# Patient Record
Sex: FEMALE | Race: WHITE | NOT HISPANIC OR LATINO | Employment: UNEMPLOYED | URBAN - METROPOLITAN AREA
[De-identification: names, ages, dates, MRNs, and addresses within clinical notes are randomized per-mention and may not be internally consistent; named-entity substitution may affect disease eponyms.]

---

## 2019-07-29 ENCOUNTER — HISTORICAL (OUTPATIENT)
Dept: ADMINISTRATIVE | Facility: HOSPITAL | Age: 34
End: 2019-07-29

## 2019-07-30 ENCOUNTER — HISTORICAL (OUTPATIENT)
Dept: RADIOLOGY | Facility: HOSPITAL | Age: 34
End: 2019-07-30

## 2019-09-10 ENCOUNTER — HISTORICAL (OUTPATIENT)
Dept: ADMINISTRATIVE | Facility: HOSPITAL | Age: 34
End: 2019-09-10

## 2019-11-22 LAB — POC BETA-HCG (QUAL): NEGATIVE

## 2019-12-09 ENCOUNTER — HISTORICAL (OUTPATIENT)
Dept: ADMINISTRATIVE | Facility: HOSPITAL | Age: 34
End: 2019-12-09

## 2019-12-09 LAB
ABS NEUT (OLG): 2.56 X10(3)/MCL (ref 2.1–9.2)
BASOPHILS # BLD AUTO: 0 X10(3)/MCL (ref 0–0.2)
BASOPHILS NFR BLD AUTO: 0 %
BUN SERPL-MCNC: 11 MG/DL (ref 7–18)
CALCIUM SERPL-MCNC: 9.4 MG/DL (ref 8.5–10.1)
CHLORIDE SERPL-SCNC: 110 MMOL/L (ref 98–107)
CO2 SERPL-SCNC: 27 MMOL/L (ref 21–32)
CREAT SERPL-MCNC: 1.2 MG/DL (ref 0.6–1.3)
CREAT/UREA NIT SERPL: 9
EOSINOPHIL # BLD AUTO: 0.1 X10(3)/MCL (ref 0–0.9)
EOSINOPHIL NFR BLD AUTO: 3 %
ERYTHROCYTE [DISTWIDTH] IN BLOOD BY AUTOMATED COUNT: 13.6 % (ref 11.5–14.5)
EST. AVERAGE GLUCOSE BLD GHB EST-MCNC: 103 MG/DL
GLUCOSE SERPL-MCNC: 82 MG/DL (ref 74–106)
HBA1C MFR BLD: 5.2 % (ref 4.2–6.3)
HCT VFR BLD AUTO: 40.2 % (ref 35–46)
HGB BLD-MCNC: 12.5 GM/DL (ref 12–16)
IMM GRANULOCYTES # BLD AUTO: 0.01 10*3/UL
IMM GRANULOCYTES NFR BLD AUTO: 0 %
LYMPHOCYTES # BLD AUTO: 1.4 X10(3)/MCL (ref 0.6–4.6)
LYMPHOCYTES NFR BLD AUTO: 31 %
MCH RBC QN AUTO: 29.5 PG (ref 26–34)
MCHC RBC AUTO-ENTMCNC: 31.1 GM/DL (ref 31–37)
MCV RBC AUTO: 94.8 FL (ref 80–100)
MONOCYTES # BLD AUTO: 0.4 X10(3)/MCL (ref 0.1–1.3)
MONOCYTES NFR BLD AUTO: 8 %
NEUTROPHILS # BLD AUTO: 2.56 X10(3)/MCL (ref 2.1–9.2)
NEUTROPHILS NFR BLD AUTO: 57 %
PLATELET # BLD AUTO: 232 X10(3)/MCL (ref 130–400)
PMV BLD AUTO: 10.5 FL (ref 7.4–10.4)
POTASSIUM SERPL-SCNC: 3.9 MMOL/L (ref 3.5–5.1)
RBC # BLD AUTO: 4.24 X10(6)/MCL (ref 4–5.2)
RPR SER QL: NORMAL
SODIUM SERPL-SCNC: 140 MMOL/L (ref 136–145)
T PALLIDUM AB SER QL: REACTIVE
T3FREE SERPL-MCNC: 3.45 PG/ML (ref 2.18–3.98)
T4 FREE SERPL-MCNC: 1.24 NG/DL (ref 0.76–1.46)
TSH SERPL-ACNC: 0.44 MIU/L (ref 0.36–3.74)
WBC # SPEC AUTO: 4.5 X10(3)/MCL (ref 4.5–11)

## 2020-10-02 ENCOUNTER — HISTORICAL (OUTPATIENT)
Dept: RADIOLOGY | Facility: HOSPITAL | Age: 35
End: 2020-10-02

## 2020-10-14 ENCOUNTER — HISTORICAL (OUTPATIENT)
Dept: ADMINISTRATIVE | Facility: HOSPITAL | Age: 35
End: 2020-10-14

## 2020-10-14 LAB
APPEARANCE, UA: CLEAR
BACTERIA #/AREA URNS AUTO: ABNORMAL /HPF
BILIRUB UR QL STRIP: NEGATIVE
CHOLEST SERPL-MCNC: 173 MG/DL
CHOLEST/HDLC SERPL: 2.4 {RATIO} (ref 0–4.4)
COLOR UR: NORMAL
DEPRECATED CALCIDIOL+CALCIFEROL SERPL-MC: 28.6 NG/ML (ref 30–80)
EST. AVERAGE GLUCOSE BLD GHB EST-MCNC: 111 MG/DL
GLUCOSE (UA): NEGATIVE
HBA1C MFR BLD: 5.5 % (ref 4.2–6.3)
HDLC SERPL-MCNC: 71 MG/DL (ref 40–59)
HGB UR QL STRIP: NEGATIVE
HYALINE CASTS #/AREA URNS LPF: ABNORMAL /LPF
KETONES UR QL STRIP: NEGATIVE
LDLC SERPL CALC-MCNC: 83 MG/DL
LEUKOCYTE ESTERASE UR QL STRIP: NEGATIVE
NITRITE UR QL STRIP: NEGATIVE
PH UR STRIP: 6.5 [PH] (ref 4.5–8)
PROT UR QL STRIP: NEGATIVE
RBC #/AREA URNS AUTO: ABNORMAL /HPF
SP GR UR STRIP: 1.02 (ref 1–1.03)
SQUAMOUS #/AREA URNS LPF: ABNORMAL /LPF
T4 FREE SERPL-MCNC: 1.14 NG/DL (ref 0.76–1.46)
TRIGL SERPL-MCNC: 93 MG/DL
TSH SERPL-ACNC: 0.34 MIU/L (ref 0.36–3.74)
UROBILINOGEN UR STRIP-ACNC: NORMAL
VLDLC SERPL CALC-MCNC: 19 MG/DL
WBC #/AREA URNS AUTO: ABNORMAL /HPF

## 2020-10-28 LAB
HUMAN PAPILLOMAVIRUS (HPV): ABNORMAL
HUMAN PAPILLOMAVIRUS (HPV): ABNORMAL
HUMAN PAPILLOMAVIRUS (HPV): POSITIVE
PAP RECOMMENDATION EXT: ABNORMAL
PAP SMEAR: ABNORMAL

## 2020-11-02 ENCOUNTER — HISTORICAL (OUTPATIENT)
Dept: RADIOLOGY | Facility: HOSPITAL | Age: 35
End: 2020-11-02

## 2021-01-22 ENCOUNTER — HISTORICAL (OUTPATIENT)
Dept: ADMINISTRATIVE | Facility: HOSPITAL | Age: 36
End: 2021-01-22

## 2021-01-22 LAB
ABS NEUT (OLG): 3.97 X10(3)/MCL (ref 2.1–9.2)
ALBUMIN SERPL-MCNC: 3.9 GM/DL (ref 3.5–5)
ALBUMIN/GLOB SERPL: 1 RATIO (ref 1.1–2)
ALP SERPL-CCNC: 77 UNIT/L (ref 40–150)
ALT SERPL-CCNC: 9 UNIT/L (ref 0–55)
AST SERPL-CCNC: 12 UNIT/L (ref 5–34)
BASOPHILS # BLD AUTO: 0 X10(3)/MCL (ref 0–0.2)
BASOPHILS NFR BLD AUTO: 0 %
BILIRUB SERPL-MCNC: 0.2 MG/DL
BILIRUBIN DIRECT+TOT PNL SERPL-MCNC: 0.1 MG/DL (ref 0–0.5)
BILIRUBIN DIRECT+TOT PNL SERPL-MCNC: 0.1 MG/DL (ref 0–0.8)
BUN SERPL-MCNC: 13 MG/DL (ref 7–18.7)
CALCIUM SERPL-MCNC: 9.1 MG/DL (ref 8.4–10.2)
CD3+CD4+ CELLS # SPEC: 544 UNIT/L (ref 589–1505)
CD3+CD4+ CELLS NFR BLD: 40.8 % (ref 31–59)
CHLORIDE SERPL-SCNC: 106 MMOL/L (ref 98–107)
CO2 SERPL-SCNC: 27 MMOL/L (ref 22–29)
CREAT SERPL-MCNC: 1.02 MG/DL (ref 0.55–1.02)
EOSINOPHIL # BLD AUTO: 0.2 X10(3)/MCL (ref 0–0.9)
EOSINOPHIL NFR BLD AUTO: 3 %
ERYTHROCYTE [DISTWIDTH] IN BLOOD BY AUTOMATED COUNT: 14.4 % (ref 11.5–14.5)
GLOBULIN SER-MCNC: 3.8 GM/DL (ref 2.4–3.5)
GLUCOSE SERPL-MCNC: 82 MG/DL (ref 74–100)
HCT VFR BLD AUTO: 37.3 % (ref 35–46)
HGB BLD-MCNC: 11.6 GM/DL (ref 12–16)
IMM GRANULOCYTES # BLD AUTO: 0.01 10*3/UL
IMM GRANULOCYTES NFR BLD AUTO: 0 %
IRON SATN MFR SERPL: 9 % (ref 20–50)
IRON SERPL-MCNC: 35 UG/DL (ref 50–170)
LYMPHOCYTES # BLD AUTO: 1.4 X10(3)/MCL (ref 0.6–4.6)
LYMPHOCYTES # BLD AUTO: 1334 UNIT/L (ref 1260–5520)
LYMPHOCYTES NFR BLD AUTO: 23 %
LYMPHOCYTES NFR LN MANUAL: 23 % (ref 28–48)
LYMPHOMA - T-CELL MARKERS SPEC-IMP: ABNORMAL
MCH RBC QN AUTO: 30.1 PG (ref 26–34)
MCHC RBC AUTO-ENTMCNC: 31.1 GM/DL (ref 31–37)
MCV RBC AUTO: 96.9 FL (ref 80–100)
MONOCYTES # BLD AUTO: 0.3 X10(3)/MCL (ref 0.1–1.3)
MONOCYTES NFR BLD AUTO: 6 %
NEUTROPHILS # BLD AUTO: 3.97 X10(3)/MCL (ref 2.1–9.2)
NEUTROPHILS NFR BLD AUTO: 68 %
PLATELET # BLD AUTO: 273 X10(3)/MCL (ref 130–400)
PMV BLD AUTO: 10.5 FL (ref 7.4–10.4)
POTASSIUM SERPL-SCNC: 3.9 MMOL/L (ref 3.5–5.1)
PROT SERPL-MCNC: 7.7 GM/DL (ref 6.4–8.3)
RBC # BLD AUTO: 3.85 X10(6)/MCL (ref 4–5.2)
SODIUM SERPL-SCNC: 140 MMOL/L (ref 136–145)
TIBC SERPL-MCNC: 372 UG/DL (ref 70–310)
TIBC SERPL-MCNC: 407 UG/DL (ref 250–450)
TRANSFERRIN SERPL-MCNC: 388 MG/DL (ref 180–382)
WBC # BLD AUTO: 5800 /MM3 (ref 4500–11500)
WBC # SPEC AUTO: 5.8 X10(3)/MCL (ref 4.5–11)

## 2021-03-04 LAB — SARS-COV-2 RNA RESP QL NAA+PROBE: NEGATIVE

## 2021-03-12 ENCOUNTER — HISTORICAL (OUTPATIENT)
Dept: RADIOLOGY | Facility: HOSPITAL | Age: 36
End: 2021-03-12

## 2021-03-12 LAB — CREAT SERPL-MCNC: 1.12 MG/DL (ref 0.55–1.02)

## 2021-03-30 ENCOUNTER — HISTORICAL (OUTPATIENT)
Dept: RADIOLOGY | Facility: HOSPITAL | Age: 36
End: 2021-03-30

## 2021-04-22 ENCOUNTER — HISTORICAL (OUTPATIENT)
Dept: ADMINISTRATIVE | Facility: HOSPITAL | Age: 36
End: 2021-04-22

## 2021-04-22 LAB
ABS NEUT (OLG): 3.84 X10(3)/MCL (ref 2.1–9.2)
ALBUMIN SERPL-MCNC: 4.1 GM/DL (ref 3.5–5)
ALBUMIN/GLOB SERPL: 1.3 RATIO (ref 1.1–2)
ALP SERPL-CCNC: 71 UNIT/L (ref 40–150)
ALT SERPL-CCNC: 9 UNIT/L (ref 0–55)
AST SERPL-CCNC: 13 UNIT/L (ref 5–34)
BASOPHILS # BLD AUTO: 0 X10(3)/MCL (ref 0–0.2)
BASOPHILS NFR BLD AUTO: 0 %
BILIRUB SERPL-MCNC: 0.3 MG/DL
BILIRUBIN DIRECT+TOT PNL SERPL-MCNC: 0.1 MG/DL (ref 0–0.5)
BILIRUBIN DIRECT+TOT PNL SERPL-MCNC: 0.2 MG/DL (ref 0–0.8)
BUN SERPL-MCNC: 10.7 MG/DL (ref 7–18.7)
CALCIUM SERPL-MCNC: 9.5 MG/DL (ref 8.4–10.2)
CD3+CD4+ CELLS # SPEC: 651 UNIT/L (ref 589–1505)
CD3+CD4+ CELLS NFR BLD: 37.5 % (ref 31–59)
CHLORIDE SERPL-SCNC: 107 MMOL/L (ref 98–107)
CO2 SERPL-SCNC: 27 MMOL/L (ref 22–29)
CREAT SERPL-MCNC: 1.08 MG/DL (ref 0.55–1.02)
EOSINOPHIL # BLD AUTO: 0.2 X10(3)/MCL (ref 0–0.9)
EOSINOPHIL NFR BLD AUTO: 3 %
ERYTHROCYTE [DISTWIDTH] IN BLOOD BY AUTOMATED COUNT: 14.9 % (ref 11.5–14.5)
GLOBULIN SER-MCNC: 3.2 GM/DL (ref 2.4–3.5)
GLUCOSE SERPL-MCNC: 84 MG/DL (ref 74–100)
HCT VFR BLD AUTO: 45.8 % (ref 35–46)
HGB BLD-MCNC: 14.8 GM/DL (ref 12–16)
IMM GRANULOCYTES # BLD AUTO: 0.01 10*3/UL
IMM GRANULOCYTES NFR BLD AUTO: 0 %
LYMPHOCYTES # BLD AUTO: 1.8 X10(3)/MCL (ref 0.6–4.6)
LYMPHOCYTES # BLD AUTO: 1736 UNIT/L (ref 1260–5520)
LYMPHOCYTES NFR BLD AUTO: 28 %
LYMPHOCYTES NFR LN MANUAL: 28 % (ref 28–48)
LYMPHOMA - T-CELL MARKERS SPEC-IMP: NORMAL
MCH RBC QN AUTO: 33.1 PG (ref 26–34)
MCHC RBC AUTO-ENTMCNC: 32.3 GM/DL (ref 31–37)
MCV RBC AUTO: 102.5 FL (ref 80–100)
MONOCYTES # BLD AUTO: 0.4 X10(3)/MCL (ref 0.1–1.3)
MONOCYTES NFR BLD AUTO: 6 %
NEUTROPHILS # BLD AUTO: 3.84 X10(3)/MCL (ref 2.1–9.2)
NEUTROPHILS NFR BLD AUTO: 62 %
PLATELET # BLD AUTO: 213 X10(3)/MCL (ref 130–400)
PMV BLD AUTO: 10.7 FL (ref 7.4–10.4)
POTASSIUM SERPL-SCNC: 4.5 MMOL/L (ref 3.5–5.1)
PROT SERPL-MCNC: 7.3 GM/DL (ref 6.4–8.3)
RBC # BLD AUTO: 4.47 X10(6)/MCL (ref 4–5.2)
SODIUM SERPL-SCNC: 140 MMOL/L (ref 136–145)
TSH SERPL-ACNC: 0.49 UIU/ML (ref 0.35–4.94)
WBC # BLD AUTO: 6200 /MM3 (ref 4500–11500)
WBC # SPEC AUTO: 6.2 X10(3)/MCL (ref 4.5–11)

## 2021-10-11 ENCOUNTER — HISTORICAL (OUTPATIENT)
Dept: ADMINISTRATIVE | Facility: HOSPITAL | Age: 36
End: 2021-10-11

## 2021-10-11 LAB
ABS NEUT (OLG): 3.87 X10(3)/MCL (ref 2.1–9.2)
ALBUMIN SERPL-MCNC: 4.1 GM/DL (ref 3.5–5)
ALBUMIN/GLOB SERPL: 1.2 RATIO (ref 1.1–2)
ALP SERPL-CCNC: 60 UNIT/L (ref 40–150)
ALT SERPL-CCNC: 10 UNIT/L (ref 0–55)
APPEARANCE, UA: CLEAR
AST SERPL-CCNC: 12 UNIT/L (ref 5–34)
BACTERIA #/AREA URNS AUTO: ABNORMAL /HPF
BASOPHILS # BLD AUTO: 0 X10(3)/MCL (ref 0–0.2)
BASOPHILS NFR BLD AUTO: 0 %
BILIRUB SERPL-MCNC: 0.3 MG/DL
BILIRUB UR QL STRIP: NEGATIVE
BILIRUBIN DIRECT+TOT PNL SERPL-MCNC: 0.1 MG/DL (ref 0–0.5)
BILIRUBIN DIRECT+TOT PNL SERPL-MCNC: 0.2 MG/DL (ref 0–0.8)
BUN SERPL-MCNC: 13.2 MG/DL (ref 7–18.7)
CALCIUM SERPL-MCNC: 9.7 MG/DL (ref 8.4–10.2)
CD3+CD4+ CELLS # SPEC: 537 UNIT/L (ref 589–1505)
CD3+CD4+ CELLS NFR BLD: 41 % (ref 31–59)
CHLORIDE SERPL-SCNC: 109 MMOL/L (ref 98–107)
CHOLEST SERPL-MCNC: 171 MG/DL
CHOLEST/HDLC SERPL: 3 {RATIO} (ref 0–5)
CO2 SERPL-SCNC: 28 MMOL/L (ref 22–29)
COLOR UR: YELLOW
CREAT SERPL-MCNC: 1.16 MG/DL (ref 0.55–1.02)
DEPRECATED CALCIDIOL+CALCIFEROL SERPL-MC: 28 NG/ML (ref 30–80)
EOSINOPHIL # BLD AUTO: 0.2 X10(3)/MCL (ref 0–0.9)
EOSINOPHIL NFR BLD AUTO: 3 %
ERYTHROCYTE [DISTWIDTH] IN BLOOD BY AUTOMATED COUNT: 12.4 % (ref 11.5–14.5)
EST CREAT CLEARANCE SER (OHS): 53.58 ML/MIN
EST. AVERAGE GLUCOSE BLD GHB EST-MCNC: 96.8 MG/DL
GLOBULIN SER-MCNC: 3.3 GM/DL (ref 2.4–3.5)
GLUCOSE (UA): NEGATIVE
GLUCOSE SERPL-MCNC: 58 MG/DL (ref 74–100)
HBA1C MFR BLD: 5 %
HCT VFR BLD AUTO: 44.1 % (ref 35–46)
HCV AB SERPL QL IA: NONREACTIVE
HDLC SERPL-MCNC: 49 MG/DL (ref 35–60)
HGB BLD-MCNC: 14.7 GM/DL (ref 12–16)
HGB UR QL STRIP: NEGATIVE
HYALINE CASTS #/AREA URNS LPF: ABNORMAL /LPF
IMM GRANULOCYTES # BLD AUTO: 0.01 10*3/UL
IMM GRANULOCYTES NFR BLD AUTO: 0 %
KETONES UR QL STRIP: ABNORMAL
LDLC SERPL CALC-MCNC: 106 MG/DL (ref 50–140)
LEUKOCYTE ESTERASE UR QL STRIP: NEGATIVE
LYMPHOCYTES # BLD AUTO: 1.3 X10(3)/MCL (ref 0.6–4.6)
LYMPHOCYTES # BLD AUTO: NORMAL UNIT/L (ref 1260–5520)
LYMPHOCYTES NFR BLD AUTO: 22 %
LYMPHOCYTES NFR LN MANUAL: NORMAL % (ref 28–48)
MCH RBC QN AUTO: 35.2 PG (ref 26–34)
MCHC RBC AUTO-ENTMCNC: 33.3 GM/DL (ref 31–37)
MCV RBC AUTO: 105.5 FL (ref 80–100)
MONOCYTES # BLD AUTO: 0.4 X10(3)/MCL (ref 0.1–1.3)
MONOCYTES NFR BLD AUTO: 7 %
NEG CONT SPOT COUNT: NORMAL
NEUTROPHILS # BLD AUTO: 3.87 X10(3)/MCL (ref 2.1–9.2)
NEUTROPHILS NFR BLD AUTO: 67 %
NITRITE UR QL STRIP: NEGATIVE
NRBC BLD AUTO-RTO: 0 % (ref 0–0.2)
PANEL A SPOT COUNT: 0
PANEL B SPOT COUNT: 0
PH UR STRIP: 6.5 [PH] (ref 4.5–8)
PLATELET # BLD AUTO: 199 X10(3)/MCL (ref 130–400)
PMV BLD AUTO: 10.4 FL (ref 7.4–10.4)
POS CONT SPOT COUNT: NORMAL
POTASSIUM SERPL-SCNC: 4 MMOL/L (ref 3.5–5.1)
PROT SERPL-MCNC: 7.4 GM/DL (ref 6.4–8.3)
PROT UR QL STRIP: 20 MG/DL
RBC # BLD AUTO: 4.18 X10(6)/MCL (ref 4–5.2)
RBC #/AREA URNS AUTO: ABNORMAL /HPF
RPR SER QL: NORMAL
SODIUM SERPL-SCNC: 142 MMOL/L (ref 136–145)
SP GR UR STRIP: 1.03 (ref 1–1.03)
SQUAMOUS #/AREA URNS LPF: ABNORMAL /LPF
T PALLIDUM AB SER QL: REACTIVE
T-SPOT.TB: NORMAL
TRIGL SERPL-MCNC: 78 MG/DL (ref 37–140)
TSH SERPL-ACNC: 0.29 UIU/ML (ref 0.35–4.94)
UROBILINOGEN UR STRIP-ACNC: 2 MG/DL
VLDLC SERPL CALC-MCNC: 16 MG/DL
WBC # BLD AUTO: NORMAL /MM3 (ref 4500–11500)
WBC # SPEC AUTO: 5.8 X10(3)/MCL (ref 4.5–11)
WBC #/AREA URNS AUTO: ABNORMAL /HPF

## 2022-04-09 ENCOUNTER — HISTORICAL (OUTPATIENT)
Dept: ADMINISTRATIVE | Facility: HOSPITAL | Age: 37
End: 2022-04-09
Payer: MEDICAID

## 2022-04-25 VITALS
WEIGHT: 112 LBS | BODY MASS INDEX: 20.61 KG/M2 | HEIGHT: 62 IN | DIASTOLIC BLOOD PRESSURE: 75 MMHG | OXYGEN SATURATION: 100 % | SYSTOLIC BLOOD PRESSURE: 108 MMHG

## 2022-04-27 DIAGNOSIS — B20 HIV DISEASE: Primary | ICD-10-CM

## 2022-04-27 DIAGNOSIS — E05.90 SUBCLINICAL HYPERTHYROIDISM: ICD-10-CM

## 2022-05-02 NOTE — HISTORICAL [facility] CERNER
This is a historical note converted from [employer]. Formatting and pictures may have been removed.  Please reference [employer]ana for original formatting and attached multimedia. Chief Complaint  B20 follow up  History of Present Illness  Gayatri is a 34 yo WF presenting today for HIV f/u visit.? She reports 100% adherent to Triumeq, tolerates well with viral suppression.? She tells me that she has been bleeding vaginally for 6 weeks now, passing large amounts of blood & clots.? She is scheduled to see GYN in 3 weeks.? Pelvic u/s results reviewed from 7/2020, will order additional imaging to be completed prior to scheduled GYN appt.? She remains in care with PCP VIKASH Curtis NP.? She is scheduled for Ophth appt 3/2020 for fundus photo.? Due for Menveo #2 today, amenable to same.? Smokes about 5 cigarettes per day, cutting back.  ?  10/14/20  Gayatri is a 34 yo WF presenting today for HIV f/u visit.? She reports 100% adherent to Triumeq, tolerates well with viral suppression.? She has attended visit with PCP MIRYAM Curtis NP.? Will collect labs today for IM & ID clinics.? She is smoking 3-4 cig/day, weaning off. Last seen by GYN in [place] 11/2019 for cervical dysplasia with colposcopy.? She has relocated to [facility] & wants to receive all care at our facility.? She is sexually active, always with a condom.? Has engaged in oral & anal intercourse as well.? Will perform full screening exam today.? She desires BTL, not a candidate for hormonal contraception due to blood clots & coumadin therapy.? Will refer to GYN for BTL as requested & f/u for dysplasia.? It has been several years since last eye exam, will refer to ophth for same.? She is due for Flu & Gardasil vaccinations today, amenable to same.  ?   7/21/20  Gayatri is a 33 yo HIV + WF evaluated by audio-only (pt logged into ila, but was unable to connect) telemedicine due to COVID-19 pandemic.? She is located at home, and I, the provider, am located at an approved  non-[facility] location.? We are both located in [place], the [facility] in which I am licensed to practice.? The patient consents to telemedicine visit and is the only individual online.??The patient (or patients representative) stated that they understood and accepted the privacy and security risks to their information at their location.? She reports 100% adherent to Triumeq, tolerates well.? Last labs HIV VL 12/19 80, CD4 10/19 433.? She agrees [place] [facility] [place].? She was seen in ED 7/17/20 for pelvic/abdominal pain, she tells me is much better now. She is scheduled for f/u with GYN on 7/24/20 with plans to attend on telemedicine.? She is followed by VIKASH Curtis NP in IM Clinic & HU Cummings RN in coumadin clinic.? She is in need of refills on coumadin & has been having trouble getting in touch with Ms. Hudson.? Will assist.? Otherwise, no concerns voiced today.  ?  Review of Systems  ?  ?  Constitutional: negative except as stated in HPI  Eye: negative except as stated in HPI  ENMT: negative except as stated in HPI  Respiratory: negative except as stated in HPI  Cardiovascular: negative except as stated in HPI  Gastrointestinal: negative except as stated in HPI  Genitourinary: negative except as stated in HPI  Hema/Lymph: negative except as stated in HPI  Endocrine: negative except as stated in HPI  Immunologic: negative except as stated in HPI  Musculoskeletal: negative except as stated in HPI  Integumentary: negative except as stated in HPI  Neurologic: negative except as stated in HPI  ?   All Other ROS_ ?negative except as stated in HPI  Physical Exam  Vitals & Measurements  T:?36.7? ?C (Oral)? HR:?99(Peripheral)? RR:?18? BP:?105/73?  HT:?157.00?cm? WT:?53.800?kg? BMI:?21.83? LMP:?01/22/2021 00:00 CST?  ?  ?  General: AAO X 4, afebrile  Eye: no icterus  HENT: oropharynx clear  Neck: supple  Respiratory: BBS CTA, non-labored, symmetrical  Cardiovascular: S1S2, RRR  Gastrointestinal BS + 4 quadrants,  NTND, soft, no organomegaly  Genitourinary: non-tender  Lymphatics: no lymphadenopathy  Musculoskeletal: MAEW, steady gait  Integumentary: WDI  Neurologic: CN II-XII intact  Assessment/Plan  1.?HIV disease?B20  adherence/sexual health counseling done  cont Triumeq 1 po daily  labs today  rtc?3 mos w Ashley, face to face?  keep f/u with PCP VIKASH Curtis NP  Ordered:  abacavir/dolutegravir/lamivudine, See Instructions, TAKE 1 TABLET BY MOUTH EVERY DAY, # 30 tab(s), 3 Refill(s), Pharmacy: [id number], 157, cm, Height/Length Dosing, 01/22/21 9:40:00 CST, 53.8, kg, Weight Dosing, 01/22/21 9:40:00 CST  1160F- Medication reconciliation completed during visit, HIV disease  Tobacco user  Need for vaccination  Pelvic pain  Menometrorrhagia  Abnormal ultrasound of pelvis, [facility], 01/22/21 10:22:00 CST  Cd4 Lymphocytes., Routine collect, 01/22/21 10:28:00 CST, Blood, Stop date 01/22/21 10:28:00 CST, Lab Collect, HIV disease, 01/22/21 10:28:00 CST  Clinic Follow up, *Est. 04/22/21 10:00:00 CDT, Order for future visit, HIV disease, [facility]  Office/Outpatient Visit Level 4 Established 58231 PC, HIV disease  Tobacco user  Need for vaccination  Pelvic pain  Menometrorrhagia  Abnormal ultrasound of pelvis, [facility], 01/22/21 10:22:00 CST  RNA, PCR(NonGraph)rfx/GenoPRIme(R)-[facility] [id number], Routine collect, 01/22/21 10:28:00 CST, Blood, Stop date 01/22/21 10:28:00 CST, Lab Collect, HIV disease, 01/22/21 10:28:00 CST  ?  2.?Tobacco user?Z72.0  ?cessation encouraged, cutting back  Ordered:  1160F- Medication reconciliation completed during visit, HIV disease  Tobacco user  Need for vaccination  Pelvic pain  Menometrorrhagia  Abnormal ultrasound of pelvis, [facility], 01/22/21 10:22:00 CST  Office/Outpatient Visit Level 4 Established 89632 PC, HIV disease  Tobacco user  Need for vaccination  Pelvic pain  Menometrorrhagia  Abnormal ultrasound of pelvis, [facility], 01/22/21 10:22:00  CST  ?  3.?Need for vaccination?Z23  ?Menveo #2 today  Ordered:  1160F- Medication reconciliation completed during visit, HIV disease  Tobacco user  Need for vaccination  Pelvic pain  Menometrorrhagia  Abnormal ultrasound of pelvis, [facility], 01/22/21 10:22:00 CST  Office/Outpatient Visit Level 4 Established 63742 PC, HIV disease  Tobacco user  Need for vaccination  Pelvic pain  Menometrorrhagia  Abnormal ultrasound of pelvis, [facility], 01/22/21 10:22:00 CST  ?  4.?Pelvic pain?R10.2  ?with abnormal pelvic u/s  MRI of pelvis with & without contrast within 2 weeks  Ordered:  1160F- Medication reconciliation completed during visit, HIV disease  Tobacco user  Need for vaccination  Pelvic pain  Menometrorrhagia  Abnormal ultrasound of pelvis, [facility], 01/22/21 10:22:00 CST  MRI Pelvis W W/O Contrast, Routine, *Est. 01/22/21 3:00:00 CST, Other (please specify), Vaginal bleeding, US abnormal, follow up, None, Ambulatory, Creatinine if needed per protocol, Rad Type, Order for future visit, Pelvic pain, Schedule this test, [facility]...  Office/Outpatient Visit Level 4 Established 03075 PC, HIV disease  Tobacco user  Need for vaccination  Pelvic pain  Menometrorrhagia  Abnormal ultrasound of pelvis, [facility], 01/22/21 10:22:00 CST  ?  5.?Menometrorrhagia?N92.1  ?CBC & Iron profile today  keep appt with GYN 2/2021 as scheduled  Ordered:  1160F- Medication reconciliation completed during visit, HIV disease  Tobacco user  Need for vaccination  Pelvic pain  Menometrorrhagia  Abnormal ultrasound of pelvis, [facility], 01/22/21 10:22:00 CST  Office/Outpatient Visit Level 4 Established 29476 PC, HIV disease  Tobacco user  Need for vaccination  Pelvic pain  Menometrorrhagia  Abnormal ultrasound of pelvis, [facility], 01/22/21 10:22:00 CST  ?  6.?Abnormal ultrasound of pelvis?R93.89  Ordered:  1160F- Medication reconciliation completed during visit, HIV disease  Tobacco  user  Need for vaccination  Pelvic pain  Menometrorrhagia  Abnormal ultrasound of pelvis, [facility], 01/22/21 10:22:00 CST  Office/Outpatient Visit Level 4 Established 73398 PC, HIV disease  Tobacco user  Need for vaccination  Pelvic pain  Menometrorrhagia  Abnormal ultrasound of pelvis, [facility], 01/22/21 10:22:00 CST  ?  Referrals  Clinic Follow up, *Est. 04/22/21 10:00:00 CDT, Order for future visit, HIV disease, [facility]   Problem List/Past Medical History  Ongoing  Closed nondisplaced fracture of proximal phalanx of right great toe  HIV disease  Subclinical hyperthyroidism  Historical  Kidney stone  None  Pregnant  Pregnant  Pregnant  Pregnant  Procedure/Surgical History  Incision and drainage of abscess (eg, carbuncle, suppurative hidradenitis, cutaneous or subcutaneous abscess, cyst, furuncle, or paronychia); simple or single (01/19/2018)  Right Kidney Removal (05/09/2014)  right nephrectomy (05/09/2014)   Medications  traZODONE 50 mg oral tablet ( Desyrel ), 50 mg= 1 tab(s), Oral, Once a day (at bedtime), 1 refills  Triumeq oral tablet, See Instructions, 3 refills  Allergies  Bactrim?(Hives)  Social History  Abuse/Neglect  No, No, Yes, 01/22/2021  Alcohol  Past, Beer, 12/09/2019  Employment/School  Unemployed, 09/10/2019  Exercise  Exercise frequency: 3-4 times/week. Exercise type: Walking., 09/10/2019  Home/Environment  Lives with Significant other. Living situation: Home/Independent. Mobile home, Risks in environment: Pets/Animal exposure., 11/05/2020  Nutrition/Health  Regular, Good, 09/10/2019  Sexual  Sexually active: No. Gender Identity Identifies as female., 09/10/2019  Spiritual/Cultural  non denomination, 09/10/2019  Substance Use  Current, Marijuana, Daily, 11/05/2020  Tobacco - High Risk, 01/06/2015  4 or less cigarettes(less than 1/4 pack)/day in last 30 days, Cigarettes, No, 4 per day., 01/22/2021  Family History  Alcohol user: Father and Maternal Grandfather.  Arthritis:  Mother.  CAD (coronary artery disease): Grandmother.  CARDIOMYOPATHY: Grandfather.  COPD (chronic obstructive pulmonary disease).: Grandfather.  Depression.: Mother and Maternal Grandmother.  Diabetes mellitus type 2: Grandmother and Maternal Grandmother.  Heart disease: Father.  Hyperlipidemia.: Mother.  Hypertension.: Maternal Grandmother.  Kidney disease: Grandfather.  Migraine: Mother.  Substance user: Father.  Immunizations  Vaccine Date Status Comments   meningococcal conjugate vaccine 01/22/2021 Given    tetanus/diphtheria/pertussis, acel(Tdap) 11/05/2020 Given Tolerated well   human papillomavirus vaccine 10/14/2020 Given    influenza virus vaccine, inactivated 10/14/2020 Given    pneumococcal 23-polyvalent vaccine 12/09/2019 Given    meningococcal conjugate vaccine 12/09/2019 Given    influenza virus vaccine, inactivated 10/09/2019 Given    human papillomavirus vaccine 10/09/2019 Given    human papillomavirus vaccine 08/12/2019 Given    pneumococcal 13-valent conjugate vaccine 08/12/2019 Given    tetanus/diphtheria/pertussis, acel(Tdap) 03/24/2009 Recorded    hepatitis B pediatric vaccine 10/06/1998 Recorded    poliovirus vaccine, live, trivalent 03/23/1998 Recorded    Health Maintenance  Health Maintenance  ???Pending?(in the next year)  ??? ??OverDue  ??? ? ? ?Smoking Cessation due??01/01/21??and every 1??year(s)  ??? ??Due?  ??? ? ? ?Alcohol Misuse Screening due??01/02/21??and every 1??year(s)  ??? ??Due In Future?  ??? ? ? ?Influenza Vaccine not due until??10/01/21??and every 1??day(s)  ??? ? ? ?ADL Screening not due until??10/14/21??and every 1??year(s)  ??? ? ? ?Obesity Screening not due until??01/01/22??and every 1??year(s)  ???Satisfied?(in the past 1 year)  ??? ??Satisfied?  ??? ? ? ?ADL Screening on??10/14/20.??Satisfied by Chris Vargas  ??? ? ? ?Alcohol Misuse Screening on??09/22/20.??Satisfied by Jarred Curtis NP  ??? ? ? ?Blood Pressure Screening on??01/22/21.??Satisfied by Violetta Lind  ???  ? ? ?Body Mass Index Check on??01/22/21.??Satisfied by Violetta Lind  ??? ? ? ?Cervical Cancer Screening on??10/14/20.??Satisfied by Beverly Denny  ??? ? ? ?Depression Screening on??01/22/21.??Satisfied by Violetta Lind  ??? ? ? ?Influenza Vaccine on??10/14/20.??Satisfied by Chris Vargas  ??? ? ? ?Obesity Screening on??01/22/21.??Satisfied by Violetta Lind  ??? ? ? ?Smoking Cessation on??09/22/20.??Satisfied by Jarred Curtis NP  ??? ? ? ?Tetanus Vaccine on??11/05/20.??Satisfied by Helen Brunner LPN  ??? ??Refused?  ??? ? ? ?Tetanus Vaccine on??09/22/20.??Recorded by Helen Brunner LPN??Reason: Patient Refuses  ?  cervical pap 11/19 colpo, 10/14/20 ASCUS HPV +  mammogram N/A  anal pap 10/19 QNS, 10/14/20 neg  anal ct/gc 10/19 neg  oral ct/gc 10/19 neg, 10/20 neg  cervical/vag ct/gc 10/19 neg, 10/20 neg  ophth 3/21  Lab Results  Test Name Test Result Date/Time Comments   Creatinine 1.10 mg/dL 10/14/2020 10:23 CDT    eGFR-SONU 61 mL/min (Low) 10/14/2020 10:23 CDT    AST 13 unit/L (Low) 10/14/2020 10:23 CDT    ALT 19 unit/L 10/14/2020 10:23 CDT    Hgb A1c 5.5 % 10/14/2020 10:23 CDT    Vit D 25 OH 28.6 ng/mL (Low) 10/14/2020 10:23 CDT    Chol 173 mg/dL 10/14/2020 10:23 CDT    HDL 71 mg/dL (High) 10/14/2020 10:23 CDT    Trig 93 mg/dL 10/14/2020 10:23 CDT    LDL 83 mg/dL 10/14/2020 10:23 CDT    Chol/HDL 2.4 10/14/2020 10:23 CDT    T4 Free 1.14 ng/dL 10/14/2020 10:23 CDT    TSH 0.341 mIU/L (Low) 10/14/2020 10:23 CDT    WBC 4.9 x10(3)/mcL 10/14/2020 10:23 CDT    Hgb 12.0 gm/dL 10/14/2020 10:23 CDT    Hct 37.0 % 10/14/2020 10:23 CDT    Platelet 221 x10(3)/mcL 10/14/2020 10:23 CDT    CD4 Pct 32.6 % 10/14/2020 10:23 CDT    CD4 Absolute 447 unit/L (Low) 10/14/2020 10:23 CDT    Chlamydia trach-LC Negative 10/14/2020 11:04 CDT    N gonorrhoeae SONU-LC Negative 10/14/2020 11:04 CDT Performed At:  [facility]  [address], [place] [id number]  El RAHMAN MD Ph:[phone]   Chl Pnnna-Nielii-BH  Negative 10/14/2020 11:04 CDT This test was developed and its performance characteristics  determined by [facility]. It has not been cleared or  approved by the Food and Drug Administration.   N vjkxy-Qdzcyf-DD Negative 10/14/2020 11:04 CDT This test was developed and its performance characteristics  determined by [facility]. It has not been cleared or  approved by the Food and Drug Administration.  Performed At: [address] [id number]  Kerwin Renae MD Ph:[phone]   HIV1 RNA PCR-LC <20 cpy/mL 10/14/2020 10:23 CDT HIV-1 RNA not detected  ?  The reportable range for this assay is 20 to 10,000,000  copies HIV-1 RNA/mL.

## 2022-05-02 NOTE — HISTORICAL [facility] CERNER
This is a historical note converted from Liya. Formatting and pictures may have been removed.  Please reference [employer] for original formatting and attached multimedia. Chief Complaint  b20 f/u  History of Present Illness  Gayatri is a 35 yo WF presenting today for HIV f/u visit.? She reports 100% adherent to Triumeq, tolerates well with viral suppression.? Last labs 4/2021 VL <20, CD4 657.? She has not been able to quit smoking as discussed last visit, now smoking 1/2 ppd.? Cessation encouraged.? She remains in care with PCP Jarred Curtis NP & has an appt in about 3 weeks with her.? Remains in care with cardiology as well. She appreciates flu vax today, remains unvaccinated against COVID-19.? She does have some lower back pain controlled with otc acetaminophen, will discuss with PCP at next visit.? All questions answered, concerns addressed.  ?  4/22/21  Gayatri is a 35 WF presenting today for HIV f/u visit.? She reports 100% adherent to Triumeq, tolerates well with viral suppression.? She remains in care with PCP VIKASH Curtis NP.?? She attended appt with GYN on 4/5/21, Mirena was placed & vaginal bleeding has started to slow.? Fundus photo completed 3/21, NL.? Last labs 1/2021 VL <20, Cd4 544.? She tells me that she had an URI a couple of weeks ago which is now improving but has ongoing right lower chest wall pain.? Appreciates chest XRay for further evaluation.? She smokes 6 cig/day, trying to quit.? She does not want to discuss COVID vaccination at all right now.? Wishes respected.  ?   1/22/21  Gayatri is a 36 yo WF presenting today for HIV f/u visit.? She reports 100% adherent to Triumeq, tolerates well with viral suppression.? She tells me that she has been bleeding vaginally for 6 weeks now, passing large amounts of blood & clots.? She is scheduled to see GYN in 3 weeks.? Pelvic u/s results reviewed from 7/2020, will order additional imaging to be completed prior to scheduled GYN appt.? She remains in care  with PCP VIKASH Curtis NP.? She is scheduled for Ophth appt 3/2020 for fundus photo.? Due for Menveo #2 today, amenable to same.? Smokes about 5 cigarettes per day, cutting back.  Review of Systems  ?  ?  Constitutional: negative except as stated in HPI  Eye: negative except as stated in HPI  ENMT: negative except as stated in HPI  Respiratory: negative except as stated in HPI  Cardiovascular: negative except as stated in HPI  Gastrointestinal: negative except as stated in HPI  Genitourinary: negative except as stated in HPI  Hema/Lymph: negative except as stated in HPI  Endocrine: negative except as stated in HPI  Immunologic: negative except as stated in HPI  Musculoskeletal: negative except as stated in HPI  Integumentary: negative except as stated in HPI  Neurologic: negative except as stated in HPI  ?   All Other ROS_ ?negative except as stated in HPI  Physical Exam  Vitals & Measurements  T:?36.9? ?C (Oral)? HR:?75(Peripheral)? RR:?16? BP:?106/72?  HT:?157.00?cm? WT:?51.000?kg? BMI:?20.69? LMP:?10/01/2021 00:00 CDT?  ?  ?  General: AAO X 4, afebrile  Eye: no icterus  HENT: oropharynx clear  Neck: supple  Respiratory: BBS CTA, non-labored, symmetrical  Cardiovascular: S1S2, RRR  Gastrointestinal BS + 4 quadrants, NTND, soft, no organomegaly  Genitourinary: non-tender  Lymphatics: no lymphadenopathy  Musculoskeletal: MAEW, steady gait  Integumentary: WDI  Neurologic: CN II-XII intact  Assessment/Plan  1.?HIV disease?B20  adherence/sexual health counseling done  cont Triumeq 1 po daily  labs today  rtc?4 mos w Ashley, face to face?  keep f/u with PCP VIKASH Curtis NP  Ordered:  abacavir/dolutegravir/lamivudine, See Instructions, TAKE 1 TABLET BY MOUTH EVERY DAY, # 30 tab(s), 4 Refill(s), Pharmacy: [facility], 157, cm, Height/Length Dosing, 10/11/21 8:54:00 CDT, 51, kg, Weight Dosing, 10/11/21 8:54:00 CDT  1160F- Medication reconciliation completed during visit, HIV disease  Tobacco user  Need for  vaccination, [facility], 10/11/21 9:08:00 CDT  Automated Diff, Routine collect, 10/11/21 9:26:00 CDT, Blood, Collected, Stop date 10/11/21 9:26:00 CDT, Lab Collect, HIV disease, 10/11/21 9:16:00 CDT  CBC w/ Auto Diff, Routine collect, 10/11/21 9:16:00 CDT, Blood, Stop date 10/11/21 9:16:00 CDT, Lab Collect, HIV disease, 10/11/21 9:16:00 CDT  Cd4 Lymphocytes., Routine collect, 10/11/21 9:16:00 CDT, Blood, Stop date 10/11/21 9:16:00 CDT, Lab Collect, HIV disease, 10/11/21 9:16:00 CDT  Chlam trachom & N gonorrhoeae by [facility] [id number], Routine collect, Urine, 10/11/21 9:40:00 CDT, Stop date 10/11/21 9:40:00 CDT, Nurse collect, HIV disease, 10/11/21 9:40:00 CDT  Clinic Follow up, *Est. 02/14/22 8:10:00 CST, Order for future visit, HIV disease, [facility]  Comprehensive Metabolic Panel, Routine collect, 10/11/21 9:16:00 CDT, Blood, Stop date 10/11/21 9:16:00 CDT, Lab Collect, HIV disease, 10/11/21 9:16:00 CDT  Hemoglobin A1C [facility], Routine collect, 10/11/21 9:16:00 CDT, Blood, Stop date 10/11/21 9:16:00 CDT, Lab Collect, HIV disease, 10/11/21 9:16:00 CDT  Hepatitis C Antibody, Routine collect, 10/11/21 9:16:00 CDT, Blood, Stop date 10/11/21 9:16:00 CDT, Lab Collect, HIV disease, 10/11/21 9:16:00 CDT  Lipid Panel, Routine collect, 10/11/21 9:16:00 CDT, Blood, Stop date 10/11/21 9:16:00 CDT, Lab Collect, HIV disease, 10/11/21 9:16:00 CDT  Office/Outpatient Visit Level 4 Established 19575 PC, HIV disease  Tobacco user  Need for vaccination, [facility], 10/11/21 9:08:00 CDT  RNA, PCR(NonGraph)rfx/GenoPRIme(R)-[facility] [id number], Routine collect, 10/11/21 9:16:00 CDT, Blood, Stop date 10/11/21 9:16:00 CDT, Lab Collect, HIV disease, 10/11/21 9:16:00 CDT  Syphilis Antibody (with Reflex RPR), Routine collect, 10/11/21 9:16:00 CDT, Blood, Stop date 10/11/21 9:16:00 CDT, Lab Collect, HIV disease, 10/11/21 9:16:00 CDT  T Spot Test for M. tuberculosis, Routine collect, 10/11/21 9:16:00 CDT, Blood, Stop date 10/11/21 9:16:00  CDT, Lab Collect, HIV disease, 10/11/21 9:16:00 CDT  T Spot Test for M. tuberculosis, Routine collect, 10/11/21 9:16:00 CDT, Blood, Stop date 10/11/21 9:16:00 CDT, Lab Collect, HIV disease, 10/11/21 9:16:00 CDT  Thyroid Stimulating Hormone, Routine collect, 10/11/21 9:16:00 CDT, Blood, Stop date 10/11/21 9:16:00 CDT, Lab Collect, HIV disease, 10/11/21 9:16:00 CDT  Urinalysis Microscopic [facility], Routine collect, Urine, Collected, 10/11/21 9:15:00 CDT, Stop date 10/11/21 9:15:00 CDT, Nurse collect, HIV disease  Urinalysis with Microscopic if Indicated, Routine collect, Urine, 10/11/21 9:40:00 CDT, Stop date 10/11/21 9:40:00 CDT, Nurse collect, HIV disease  Vitamin D, 25-Hydroxy Level, Routine collect, 10/11/21 9:16:00 CDT, Blood, Stop date 10/11/21 9:16:00 CDT, Lab Collect, HIV disease, 10/11/21 9:16:00 CDT  ?  2.?Tobacco user?Z72.0  ?cessation encouraged  Ordered:  1160F- Medication reconciliation completed during visit, HIV disease  Tobacco user  Need for vaccination, [facility], 10/11/21 9:08:00 CDT  Office/Outpatient Visit Level 4 Established 34911 , HIV disease  Tobacco user  Need for vaccination, [facility], 10/11/21 9:08:00 CDT  ?  3.?Need for vaccination?Z23  ?flu vax today  Ordered:  1160F- Medication reconciliation completed during visit, HIV disease  Tobacco user  Need for vaccination, [facility], 10/11/21 9:08:00 CDT  Office/Outpatient Visit Level 4 Established 87991 , HIV disease  Tobacco user  Need for vaccination, [facility], 10/11/21 9:08:00 CDT  ?  Referrals  Clinic Follow up, *Est. 02/14/22 8:10:00 CST, Order for future visit, HIV disease, [facility]   Problem List/Past Medical History  Ongoing  Closed nondisplaced fracture of proximal phalanx of right great toe  HIV disease  Subclinical hyperthyroidism  Historical  Kidney stone  None  Pregnant  Pregnant  Pregnant  Pregnant  Procedure/Surgical History  Incision and drainage of abscess (eg, carbuncle, suppurative hidradenitis,  cutaneous or subcutaneous abscess, cyst, furuncle, or paronychia); simple or single (01/19/2018)  Right Kidney Removal (05/09/2014)  right nephrectomy (05/09/2014)   Medications  acetaminophen-hydrocodone 325 mg-5 mg oral tablet, 1 tab(s), Oral, q6hr  ferrous sulfate 325 mg oral tablet, 325 mg= 1 tab(s), Oral, Daily  levonorgestrel 52 mg intrauteral device, 52 mg= 1 EA, Intrauteral, Once  traZODONE 50 mg oral tablet ( Desyrel ), 50 mg= 1 tab(s), Oral, Once a day (at bedtime), 1 refills  Triumeq oral tablet, See Instructions, 4 refills  Allergies  Bactrim?(Hives)  Social History  Abuse/Neglect  No, 10/11/2021  Alcohol  Past, Beer, 04/22/2021  Employment/School  Unemployed, 02/12/2021  Exercise  Exercise type: Walking., 02/12/2021  Financial/Legal Situation  None, 05/05/2021  Home/Environment  Lives with Significant other. Living situation: Home/Independent. Mobile home, Risks in environment: Pets/Animal exposure., 02/12/2021    Never in , 03/16/2021  Nutrition/Health  Regular, Good, 02/12/2021  Sexual  Sexually active: Yes. Number of current partners 1. Sexual orientation: Straight or heterosexual. Gender Identity Identifies as female. Yes, 02/12/2021  Spiritual/Cultural  non denomination, 09/10/2019  Substance Use  Current, Marijuana, Daily, 04/22/2021  Tobacco - High Risk, 01/06/2015  5-9 cigarettes (between 1/4 to 1/2 pack)/day in last 30 days, No, 10/11/2021  Family History  Alcohol user: Father and Maternal Grandfather.  Arthritis: Mother.  CAD (coronary artery disease): Grandmother.  CARDIOMYOPATHY: Grandfather.  COPD (chronic obstructive pulmonary disease).: Grandfather.  Depression.: Mother and Maternal Grandmother.  Diabetes mellitus type 2: Grandmother and Maternal Grandmother.  Heart disease: Father.  Hyperlipidemia.: Mother.  Hypertension.: Maternal Grandmother.  Kidney disease: Grandfather.  Migraine: Mother.  Substance user: Father.  Immunizations  Vaccine Date Status Comments    influenza virus vaccine, inactivated 10/11/2021 Given    meningococcal conjugate vaccine 01/22/2021 Given    tetanus/diphtheria/pertussis, acel(Tdap) 11/05/2020 Given Tolerated well   human papillomavirus vaccine 10/14/2020 Given    influenza virus vaccine, inactivated 10/14/2020 Given    pneumococcal 23-polyvalent vaccine 12/09/2019 Given    meningococcal conjugate vaccine 12/09/2019 Given    influenza virus vaccine, inactivated 10/09/2019 Given    human papillomavirus vaccine 10/09/2019 Given    human papillomavirus vaccine 08/12/2019 Given    pneumococcal 13-valent conjugate vaccine 08/12/2019 Given    tetanus/diphtheria/pertussis, acel(Tdap) 03/24/2009 Recorded    hepatitis B pediatric vaccine 10/06/1998 Recorded    poliovirus vaccine, live, trivalent 03/23/1998 Recorded    Health Maintenance  Health Maintenance  ???Pending?(in the next year)  ??? ??Refused?  ??? ? ? ?Alcohol Misuse Screening due??01/02/21??and every 1??year(s)  ??? ??Due In Future?  ??? ? ? ?Obesity Screening not due until??01/01/22??and every 1??year(s)  ??? ? ? ?Smoking Cessation not due until??01/01/22??and every 1??year(s)  ???Satisfied?(in the past 1 year)  ??? ??Satisfied?  ??? ? ? ?ADL Screening on??10/11/21.??Satisfied by Chris Vargas  ??? ? ? ?Blood Pressure Screening on??10/11/21.??Satisfied by Chris Vargas  ??? ? ? ?Body Mass Index Check on??10/11/21.??Satisfied by Chris Vargas  ??? ? ? ?Cervical Cancer Screening on??10/14/20.??Satisfied by Beverly Denny  ??? ? ? ?Depression Screening on??10/11/21.??Satisfied by Chris Vargas  ??? ? ? ?Influenza Vaccine on??10/11/21.??Satisfied by Chris Vargas  ??? ? ? ?Obesity Screening on??10/11/21.??Satisfied by Chris Vargas  ??? ? ? ?Smoking Cessation on??10/11/21.??Satisfied by Chris Vargas  ??? ? ? ?Tetanus Vaccine on??11/05/20.??Satisfied by Helen Brunner LPN.  ??? ??Refused?  ??? ? ? ?Alcohol Misuse Screening on??03/04/21.??Recorded by Wesley Sheridan??Reason: Patient Refuses  ??? ? ?  ?Smoking Cessation on??03/04/21.??Recorded by Wesley Sheridan??Reason: Patient Refuses  ?  cervical pap 11/19 colpo, 10/14/20 ASCUS HPV +, 2/21 colpo  mammogram N/A  anal pap 10/19 QNS, 10/14/20 neg  anal ct/gc 10/19 neg  oral ct/gc 10/19 neg, 10/20 neg  cervical/vag ct/gc 10/19 neg, 10/20 neg  ophth 3/21  Lab Results  Test Name Test Result Date/Time Comments   Creatinine 1.08 mg/dL (High) 04/22/2021 11:07 CDT    eGFR-SONU 62 mL/min/1.73 m2 (Low) 04/22/2021 11:07 CDT    AST 13 unit/L 04/22/2021 11:07 CDT    ALT 9 unit/L 04/22/2021 11:07 CDT    TSH 0.4889 uIU/mL 04/22/2021 11:07 CDT    WBC 6.2 x10(3)/mcL 04/22/2021 11:07 CDT    Hgb 14.8 gm/dL 04/22/2021 11:07 CDT    Hct 45.8 % 04/22/2021 11:07 CDT    Platelet 213 x10(3)/mcL 04/22/2021 11:07 CDT    CD4 Pct 37.5 % 04/22/2021 11:07 CDT    CD4 Absolute 651 unit/L 04/22/2021 11:07 CDT    HIV1 RNA PCR-LC <20 cpy/mL 04/22/2021 11:07 CDT HIV-1 RNA not detected  ?  The reportable range for this assay is 20 to 10,000,000  copies HIV-1 RNA/mL.      [1] Office Visit Note; Ashley Quiroz 04/22/2021 13:50 CDT

## 2022-05-02 NOTE — HISTORICAL [facility] CERNER
This is a historical note converted from Liya. Formatting and pictures may have been removed.  Please reference [employer] for original formatting and attached multimedia. Chief Complaint  B20 f/u  History of Present Illness  Gayatri is a 35 WF presenting today for HIV f/u visit.? She reports 100% adherent to Triumeq, tolerates well with viral suppression.? She remains in care with PCP VIKASH Curtis NP.?? She attended appt with GYN on 4/5/21, Mirena was placed & vaginal bleeding has started to slow.? Fundus photo completed 3/21, NL.? Last labs 1/2021 VL <20, Cd4 544.? She tells me that she had an URI a couple of weeks ago which is now improving but has ongoing right lower chest wall pain.? Appreciates chest XRay for further evaluation.? She smokes 6 cig/day, trying to quit.? She does not want to discuss COVID vaccination at all right now.? Wishes respected.  ?  1/22/21  Gayatri is a 36 yo WF presenting today for HIV f/u visit.? She reports 100% adherent to Triumeq, tolerates well with viral suppression.? She tells me that she has been bleeding vaginally for 6 weeks now, passing large amounts of blood & clots.? She is scheduled to see GYN in 3 weeks.? Pelvic u/s results reviewed from 7/2020, will order additional imaging to be completed prior to scheduled GYN appt.? She remains in care with PCP VIKASH Curtis NP.? She is scheduled for Ophth appt 3/2020 for fundus photo.? Due for Menveo #2 today, amenable to same.? Smokes about 5 cigarettes per day, cutting back.  ?   10/14/20  Gayatri is a 36 yo WF presenting today for HIV f/u visit.? She reports 100% adherent to Triumeq, tolerates well with viral suppression.? She has attended visit with PCP MIRYAM Curtis NP.? Will collect labs today for IM & ID clinics.? She is smoking 3-4 cig/day, weaning off. Last seen by GYN in [place] 11/2019 for cervical dysplasia with colposcopy.? She has relocated to [facility] & wants to receive all care at our facility.? She is sexually  active, always with a condom.? Has engaged in oral & anal intercourse as well.? Will perform full screening exam today.? She desires BTL, not a candidate for hormonal contraception due to blood clots & coumadin therapy.? Will refer to GYN for BTL as requested & f/u for dysplasia.? It has been several years since last eye exam, will refer to ophth for same.? She is due for Flu & Gardasil vaccinations today, amenable to same.  Review of Systems  ?  ?  Constitutional: negative except as stated in HPI  Eye: negative except as stated in HPI  ENMT: negative except as stated in HPI  Respiratory: negative except as stated in HPI  Cardiovascular: negative except as stated in HPI  Gastrointestinal: negative except as stated in HPI  Genitourinary: negative except as stated in HPI  Hema/Lymph: negative except as stated in HPI  Endocrine: negative except as stated in HPI  Immunologic: negative except as stated in HPI  Musculoskeletal: negative except as stated in HPI  Integumentary: negative except as stated in HPI  Neurologic: negative except as stated in HPI  ?   All Other ROS_ ?negative except as stated in HPI  Physical Exam  Vitals & Measurements  T:?36.7? ?C (Oral)? HR:?65(Peripheral)? RR:?18? BP:?103/74?  HT:?162.00?cm? WT:?52.850?kg? BMI:?20.14? LMP:?04/08/2021 00:00 CDT?  ?  ?  General: AAO X 4, afebrile  Eye: no icterus  HENT: oropharynx clear  Neck: supple  Respiratory: BBS CTA, non-labored, symmetrical  Cardiovascular: S1S2, RRR  Gastrointestinal BS + 4 quadrants, NTND, soft, no organomegaly  Genitourinary: non-tender  Lymphatics: no lymphadenopathy  Musculoskeletal: MAEW, steady gait  Integumentary: WDI  Neurologic: CN II-XII intact  Assessment/Plan  1.?HIV disease?B20  adherence/sexual health counseling done  cont Triumeq 1 po daily  labs today  rtc?4 mos w Ashley, face to face?  keep f/u with PCP VIKASH Curtis NP  Ordered:  abacavir/dolutegravir/lamivudine, See Instructions, TAKE 1 TABLET BY MOUTH EVERY DAY, # 30  tab(s), 4 Refill(s), Pharmacy: [facility], 162, cm, Height/Length Dosing, 04/22/21 10:18:00 CDT, 52.85, kg, Weight Dosing, 04/22/21 10:18:00 CDT  1160F- Medication reconciliation completed during visit, HIV disease  Tobacco user  URI (upper respiratory infection), [facility], 04/22/21 10:39:00 CDT  Cd4 Lymphocytes., Routine collect, 04/22/21 11:00:00 CDT, Blood, Stop date 04/22/21 11:00:00 CDT, Lab Collect, HIV disease, 04/22/21 11:00:00 CDT  Clinic Follow up, *Est. 08/26/21 10:00:00 CDT, Order for future visit, HIV disease, [facility]  Office/Outpatient Visit Level 4 Established 15252 PC, HIV disease  Tobacco user  URI (upper respiratory infection), [facility], 04/22/21 10:39:00 CDT  RNA, PCR(NonGraph)rfx/GenoPRIme(R)-[facility] [id number], Routine collect, 04/22/21 11:00:00 CDT, Blood, Stop date 04/22/21 11:00:00 CDT, Lab Collect, HIV disease, 04/22/21 11:00:00 CDT  ?  2.?Tobacco user?Z72.0  ?cessation encouraged, cutting back  Ordered:  1160F- Medication reconciliation completed during visit, HIV disease  Tobacco user  URI (upper respiratory infection), [facility], 04/22/21 10:39:00 CDT  Office/Outpatient Visit Level 4 Established 19552 PC, HIV disease  Tobacco user  URI (upper respiratory infection), [facility], 04/22/21 10:39:00 CDT  ?  3.?URI (upper respiratory infection)?J06.9  ?Chest xray today  Ordered:  1160F- Medication reconciliation completed during visit, HIV disease  Tobacco user  URI (upper respiratory infection), [facility], 04/22/21 10:39:00 CDT  Office/Outpatient Visit Level 4 Established 47678 PC, HIV disease  Tobacco user  URI (upper respiratory infection), [facility], 04/22/21 10:39:00 CDT  ?  Referrals  Clinic Follow up, *Est. 08/26/21 10:00:00 CDT, Order for future visit, HIV disease, [facility]   Problem List/Past Medical History  Ongoing  Closed nondisplaced fracture of proximal phalanx of right great toe  HIV disease  Subclinical  hyperthyroidism  Historical  Kidney stone  None  Pregnant  Pregnant  Pregnant  Pregnant  Procedure/Surgical History  Incision and drainage of abscess (eg, carbuncle, suppurative hidradenitis, cutaneous or subcutaneous abscess, cyst, furuncle, or paronychia); simple or single (01/19/2018)  Right Kidney Removal (05/09/2014)  right nephrectomy (05/09/2014)   Medications  ferrous sulfate 325 mg oral tablet, 325 mg= 1 tab(s), Oral, Daily, 3 refills  levonorgestrel 52 mg intrauteral device, 52 mg= 1 EA, Intrauteral, Once  Promethazine DM 6.25 mg-15 mg/5 mL oral syrup, 5 mL, Oral, q6hr, PRN  traZODONE 50 mg oral tablet ( Desyrel ), 50 mg= 1 tab(s), Oral, Once a day (at bedtime), 1 refills  Triumeq oral tablet, See Instructions, 4 refills  Allergies  Bactrim?(Hives)  Social History  Abuse/Neglect  No, No, Yes, 04/22/2021  Alcohol  Past, Beer, 04/22/2021  Employment/School  Unemployed, 02/12/2021  Exercise  Exercise type: Walking., 02/12/2021  Home/Environment  Lives with Significant other. Living situation: Home/Independent. Mobile home, Risks in environment: Pets/Animal exposure., 02/12/2021    Never in , 03/16/2021  Nutrition/Health  Regular, Good, 02/12/2021  Sexual  Sexually active: Yes. Number of current partners 1. Sexual orientation: Straight or heterosexual. Gender Identity Identifies as female. Yes, 02/12/2021  Spiritual/Cultural  non denomination, 09/10/2019  Substance Use  Current, Marijuana, Daily, 04/22/2021  Tobacco - High Risk, 01/06/2015  5-9 cigarettes (between 1/4 to 1/2 pack)/day in last 30 days, Cigarettes, No, 8 per day., 04/22/2021  Family History  Alcohol user: Father and Maternal Grandfather.  Arthritis: Mother.  CAD (coronary artery disease): Grandmother.  CARDIOMYOPATHY: Grandfather.  COPD (chronic obstructive pulmonary disease).: Grandfather.  Depression.: Mother and Maternal Grandmother.  Diabetes mellitus type 2: Grandmother and Maternal Grandmother.  Heart disease:  Father.  Hyperlipidemia.: Mother.  Hypertension.: Maternal Grandmother.  Kidney disease: Grandfather.  Migraine: Mother.  Substance user: Father.  Immunizations  Vaccine Date Status Comments   meningococcal conjugate vaccine 01/22/2021 Given    tetanus/diphtheria/pertussis, acel(Tdap) 11/05/2020 Given Tolerated well   human papillomavirus vaccine 10/14/2020 Given    influenza virus vaccine, inactivated 10/14/2020 Given    pneumococcal 23-polyvalent vaccine 12/09/2019 Given    meningococcal conjugate vaccine 12/09/2019 Given    influenza virus vaccine, inactivated 10/09/2019 Given    human papillomavirus vaccine 10/09/2019 Given    human papillomavirus vaccine 08/12/2019 Given    pneumococcal 13-valent conjugate vaccine 08/12/2019 Given    tetanus/diphtheria/pertussis, acel(Tdap) 03/24/2009 Recorded    hepatitis B pediatric vaccine 10/06/1998 Recorded    poliovirus vaccine, live, trivalent 03/23/1998 Recorded    Health Maintenance  Health Maintenance  ???Pending?(in the next year)  ??? ??Refused?  ??? ? ? ?Smoking Cessation due??01/01/21??and every 1??year(s)  ??? ? ? ?Alcohol Misuse Screening due??01/02/21??and every 1??year(s)  ??? ??Due In Future?  ??? ? ? ?Influenza Vaccine not due until??10/01/21??and every 1??day(s)  ??? ? ? ?Obesity Screening not due until??01/01/22??and every 1??year(s)  ???Satisfied?(in the past 1 year)  ??? ??Satisfied?  ??? ? ? ?ADL Screening on??04/22/21.??Satisfied by Elizabeth Pantoja LPN  ??? ? ? ?Alcohol Misuse Screening on??09/22/20.??Satisfied by Jarred Curtis NP  ??? ? ? ?Blood Pressure Screening on??04/22/21.??Satisfied by Elizabeth Pantoja LPN  ??? ? ? ?Body Mass Index Check on??04/22/21.??Satisfied by Elizabeth Pantoja LPN  ??? ? ? ?Cervical Cancer Screening on??10/14/20.??Satisfied by Beverly Denny  ??? ? ? ?Depression Screening on??04/22/21.??Satisfied by Elizabeth Pantoja LPN  ??? ? ? ?Influenza Vaccine on??10/14/20.??Satisfied by Chris Vargas  ??? ? ? ?Obesity  Screening on??04/22/21.??Satisfied by Elizabeth Pantoja LPN  ??? ? ? ?Smoking Cessation on??09/22/20.??Satisfied by Jarred Curtis NP  ??? ? ? ?Tetanus Vaccine on??11/05/20.??Satisfied by Helen Brunner LPN  ??? ??Refused?  ??? ? ? ?Alcohol Misuse Screening on??03/04/21.??Recorded by Wesley Sheridan??Reason: Patient Refuses  ??? ? ? ?Smoking Cessation on??03/04/21.??Recorded by Wesley Sheridan??Reason: Patient Refuses  ??? ? ? ?Tetanus Vaccine on??09/22/20.??Recorded by Helen Brunner LPN??Reason: Patient Refuses  ?  cervical pap 11/19 colpo, 10/14/20 ASCUS HPV +, 2/21 colpo  mammogram N/A  anal pap 10/19 QNS, 10/14/20 neg  anal ct/gc 10/19 neg  oral ct/gc 10/19 neg, 10/20 neg  cervical/vag ct/gc 10/19 neg, 10/20 neg  ophth 3/21  Lab Results  Test Name Test Result Date/Time Comments   Creatinine 1.02 mg/dL 01/22/2021 10:28 CST    eGFR-SONU 66 mL/min/1.73 m2 (Low) 01/22/2021 10:28 CST    AST 12 unit/L 01/22/2021 10:28 CST    ALT 9 unit/L 01/22/2021 10:28 CST    WBC 5.8 x10(3)/mcL 01/22/2021 10:28 CST    Hgb 11.6 gm/dL (Low) 01/22/2021 10:28 CST    Hct 37.3 % 01/22/2021 10:28 CST    Platelet 273 x10(3)/mcL 01/22/2021 10:28 CST    Lymph Percent 23 % (Low) 01/22/2021 10:28 CST    CD4 Pct 40.8 % 01/22/2021 10:28 CST    CD4 Absolute 544 unit/L (Low) 01/22/2021 10:28 CST    HIV1 RNA PCR-LC <20 cpy/mL 01/22/2021 10:28 CST HIV-1 RNA not detected  ?  The reportable range for this assay is 20 to 10,000,000  copies HIV-1 RNA/mL.

## 2022-05-02 NOTE — HISTORICAL [facility] CERNER
This is a historical note converted from Liya. Formatting and pictures may have been removed.  Please reference Cerjuana for original formatting and attached multimedia. Chief Complaint  b20 f/u  History of Present Illness  Gayatri is a 36 yo WF presenting today for HIV f/u visit.? She reports 100% adherent to Triumeq, tolerates well with viral suppression.? She has attended visit with PCP MIRYAM Curtis NP.? Will collect labs today for IM & ID clinics.? She is smoking 3-4 cig/day, weaning off. Last seen by GYN in [place] 11/2019 for cervical dysplasia with colposcopy.? She has relocated to [facility] & wants to receive all care at our facility.? She is sexually active, always with a condom.? Has engaged in oral & anal intercourse as well.? Will perform full screening exam today.? She desires BTL, not a candidate for hormonal contraception due to blood clots & coumadin therapy.? Will refer to GYN for BTL as requested & f/u for dysplasia.? It has been several years since last eye exam, will refer to ophth for same.? She is due for Flu & Gardasil vaccinations today, amenable to same.  ?  7/21/20  Gayatri is a 33 yo HIV + WF evaluated by audio-only (pt logged into ila, but was unable to connect) telemedicine due to COVID-19 pandemic.? She is located at home, and I, the provider, am located at an approved non-[facility] location.? We are both located in [place], the state in which I am licensed to practice.? The patient consents to telemedicine visit and is the only individual online.??The patient (or patients representative) stated that they understood and accepted the privacy and security risks to their information at their location.? She reports 100% adherent to Triumeq, tolerates well.? Last labs HIV VL 12/19 80, CD4 10/19 433.? She agrees to go to [facility] this week for updated labs.? She was seen in ED 7/17/20 for pelvic/abdominal pain, she tells me is much better now. She is scheduled for f/u with GYN on 7/24/20 with  plans to attend on telemedicine.? She is followed by VIKASH Curtis NP in IM Clinic & HU Cummings RN in coumadin clinic.? She is in need of refills on coumadin & has been having trouble getting in touch with Ms. Hudson.? Will assist.? Otherwise, no concerns voiced today.  ?   Telephone?time spent with patient exceeds?15 minutes, over 50% of which was used for education and counseling regarding medical conditions, current medications including risk/benefit and side effects/adverse events, over the counter medications-uses/doses, home self-care?and contact precautions, and red flags and indications for immediate medical attention.??The patient is receptive, expresses understanding and is agreeable to plan. All questions answered.  ?   3/24/20  Gayatri is a 33 yo HIV + female evaluated via telephone visit due to COVID 19 pandemic.? She consents verbally to phone visit and is only person on phone line.? She reports 100% adherent to Triumeq daily with food, last labs 12/2019 VL 80 copies.? Denies fever, chills, body aches.? Endorses nasal drip with cough.? No concerns voiced today.  ?  Review of Systems  ?  ?  Constitutional: negative except as stated in HPI  Eye: negative except as stated in HPI  ENMT: negative except as stated in HPI  Respiratory: negative except as stated in HPI  Cardiovascular: negative except as stated in HPI  Gastrointestinal: negative except as stated in HPI  Genitourinary: negative except as stated in HPI  Hema/Lymph: negative except as stated in HPI  Endocrine: negative except as stated in HPI  Immunologic: negative except as stated in HPI  Musculoskeletal: negative except as stated in HPI  Integumentary: negative except as stated in HPI  Neurologic: negative except as stated in HPI  ?   All Other ROS_ ?negative except as stated in HPI  Physical Exam  Vitals & Measurements  T:?36.7? ?C (Oral)? HR:?67(Peripheral)? RR:?14? BP:?103/71?  HT:?157.00?cm? WT:?57.100?kg? BMI:?23.17? LMP:?10/01/2020 00:00  CDT?  General: AAO X 4, afebrile  Eye: no icterus  HENT: oropharynx clear, oral ct/gc swab done  Neck: supple  Respiratory: BBS CTA, non-labored, symmetrical  Cardiovascular: S1S2, RRR  Breasts: no skin changes, no palp masses, no LAD, no nipple d/c  Gastrointestinal BS + 4 quadrants, NTND, soft, no organomegaly  Genitourinary: non-tender, no ext lesions,?no vag?d/c, cervix pink, no lesions, no palp mass ut/adx  Rectal: no ext lesions, good sphincter tone, no palp mass, no esme blood, swab done for?pap  Lymphatics: no lymphadenopathy  Musculoskeletal: MAEW, steady gait  Integumentary: WDI  Neurologic: CN II-XII intact  Assessment/Plan  1.?HIV disease?B20  ?adherence/sexual health counseling done  cont Triumeq 1 po daily  labs today  rtc?3 mos w Ashley, face to face?  refer to ophth for fundus photo  keep f/u with PCP VIKASH Curtis NP  Ordered:  abacavir/dolutegravir/lamivudine, See Instructions, TAKE 1 TABLET BY MOUTH EVERY DAY, # 30 tab(s), 3 Refill(s), Pharmacy: [facility] #[id number], 157, cm, Height/Length Dosing, 10/14/20 9:36:00 CDT, 57.1, kg, Weight Dosing, 10/14/20 9:36:00 CDT  .Cd4 Lymphocytes, Routine collect, 10/14/20 10:01:00 CDT, Blood, Order for future visit, Stop date 10/14/20 10:01:00 CDT, Lab Collect, HIV disease, 10/14/20 10:01:00 CDT  1160F- Medication reconciliation completed during visit, HIV disease  Need for vaccination  Routine screening for STI (sexually transmitted infection)  Cervical cancer screening  Contraception management  Cancer screening  Tobacco user, [facility], 10/14/20 10:01:00 CDT  CBC w/ Auto Diff, Now collect, 10/14/20 10:01:00 CDT, Blood, Order for future visit, Stop date 10/14/20 10:01:00 CDT, Lab Collect, HIV disease, 10/14/20 10:01:00 CDT  Clinic Follow up, *Est. 01/14/21 3:00:00 CST, Order for future visit, HIV disease, [facility]  Comprehensive Metabolic Panel, Routine collect, 10/14/20 10:01:00 CDT, Blood, Order for future visit, Stop date 10/14/20  10:01:00 CDT, Lab Collect, HIV disease, 10/14/20 10:01:00 CDT  Office/Outpatient Visit Level 4 Established 21880 PC, HIV disease  Need for vaccination  Routine screening for STI (sexually transmitted infection)  Cervical cancer screening  Contraception management  Cancer screening  Tobacco user, [facility] 25, 10/14/20 10:00:00 CDT  RNA, PCR(NonGraph)rfx/GenoPRIme(R)-[facility] [id number], Routine collect, 10/14/20 10:01:00 CDT, Blood, Order for future visit, Stop date 10/14/20 10:01:00 CDT, Lab Collect, HIV disease, 10/14/20 10:01:00 CDT  ?  2.?Need for vaccination?Z23  ?Flu & Gardasil 9 #3 today  Menveo #2 & TDAP next visit  Ordered:  human papillomavirus vaccine, 0.5 mL, form: Susp, IM, Once-Unscheduled, first dose 10/14/20 10:01:00 CDT  Influenza Virus Vaccine, Inactivated, 0.5 mL, form: Susp, IM, Once, first dose 10/14/20 10:01:00 CDT, stop date 10/14/20 10:01:00 CDT  1160F- Medication reconciliation completed during visit, HIV disease  Need for vaccination  Routine screening for STI (sexually transmitted infection)  Cervical cancer screening  Contraception management  Cancer screening  Tobacco user, [facility], 10/14/20 10:01:00 CDT  Office/Outpatient Visit Level 4 Established 58587 PC, HIV disease  Need for vaccination  Routine screening for STI (sexually transmitted infection)  Cervical cancer screening  Contraception management  Cancer screening  Tobacco user, [facility] 25, 10/14/20 10:00:00 CDT  ?  3.?Routine screening for STI (sexually transmitted infection)?Z11.3  ?oral & cervical swabs for ct/gc, wet prep collected  Ordered:  1160F- Medication reconciliation completed during visit, HIV disease  Need for vaccination  Routine screening for STI (sexually transmitted infection)  Cervical cancer screening  Contraception management  Cancer screening  Tobacco user, [facility], 10/14/20 10:01:00 CDT  Chlam trachom & N gonorrhoeae by [facility] [id number], Routine collect, Cervical, Order for  future visit, 10/14/20 10:01:00 CDT, Stop date 10/14/20 10:01:00 CDT, Nurse collect, Routine screening for STI (sexually transmitted infection), 10/14/20 10:01:00 CDT  Chlamydia trach & N.gonorrhoeae Pharyn AMD-[facility], Routine collect, Throat, Order for future visit, 10/14/20 10:01:00 CDT, Stop date 10/14/20 10:01:00 CDT, Nurse collect, Routine screening for STI (sexually transmitted infection), 10/14/20 10:01:00 CDT  Office/Outpatient Visit Level 4 Established 42628 PC, HIV disease  Need for vaccination  Routine screening for STI (sexually transmitted infection)  Cervical cancer screening  Contraception management  Cancer screening  Tobacco user, [facility] 25, 10/14/20 10:00:00 CDT  Wet Prep Smear, Routine collect, Vaginal, Order for future visit, 10/14/20 10:01:00 CDT, Stop date 10/14/20 10:01:00 CDT, Nurse collect, Routine screening for STI (sexually transmitted infection), 10/14/20 10:01:00 CDT  ?  4.?Cervical cancer screening?Z12.4  ?hx of dysplasia with colpo 11/2019  cervical pap collected  refer to GYN for f/u  Ordered:  1160F- Medication reconciliation completed during visit, HIV disease  Need for vaccination  Routine screening for STI (sexually transmitted infection)  Cervical cancer screening  Contraception management  Cancer screening  Tobacco user, [facility], 10/14/20 10:01:00 CDT  Office/Outpatient Visit Level 4 Established 13986 PC, HIV disease  Need for vaccination  Routine screening for STI (sexually transmitted infection)  Cervical cancer screening  Contraception management  Cancer screening  Tobacco user, [facility] 25, 10/14/20 10:00:00 CDT  Pap Collections PC, 10/14/20 10:00:00 CDT, [facility], Routine, 10/14/20 10:00:00 CDT, Cervical cancer screening  Pathology Gyn Request, 10/14/20 10:01:00 CDT, AP Specimen, Thin Prep Pap Cervical-Auto/man screen, Routine GYN/Pap, Cervical, Thin Prep Pap, Normal, 10/01/20, Previous Pap, 11/2019, Abnormal Pap, None, Previous Pregnancy,  Cervix Present, Routine, Collected, RT - Routine, Ce...  ?  5.?Contraception management?Z30.9  ?desires BTL  refer to GYN for evaluation  Ordered:  1160F- Medication reconciliation completed during visit, HIV disease  Need for vaccination  Routine screening for STI (sexually transmitted infection)  Cervical cancer screening  Contraception management  Cancer screening  Tobacco user, [facility], 10/14/20 10:01:00 CDT  Office/Outpatient Visit Level 4 Established 80619 PC, HIV disease  Need for vaccination  Routine screening for STI (sexually transmitted infection)  Cervical cancer screening  Contraception management  Cancer screening  Tobacco user, [facility] 25, 10/14/20 10:00:00 CDT  ?  6.?Cancer screening?Z12.9  ?anal pap collected, hx of cervical dysplasia  Ordered:  1160F- Medication reconciliation completed during visit, HIV disease  Need for vaccination  Routine screening for STI (sexually transmitted infection)  Cervical cancer screening  Contraception management  Cancer screening  Tobacco user, [facility], 10/14/20 10:01:00 CDT  Office/Outpatient Visit Level 4 Established 47031 PC, HIV disease  Need for vaccination  Routine screening for STI (sexually transmitted infection)  Cervical cancer screening  Contraception management  Cancer screening  Tobacco user, [facility] 25, 10/14/20 10:00:00 CDT  Pathology Non-Gyn Request [facility], 10/14/20 10:01:00 CDT, Routine, Order for future visit, AP Specimen, anal swab, anal ca screen/hiv/cervical dysplasia, Collected, Nurse Collect, Print Label, RT - Routine, [email or web address], Hold Until Collected, Cancer screening, 10/14/20 10:01:00 CDT  ?  7.?Tobacco user?Z72.0  ?cessation encouraged  Ordered:  1160F- Medication reconciliation completed during visit, HIV disease  Need for vaccination  Routine screening for STI (sexually transmitted infection)  Cervical cancer screening  Contraception management  Cancer screening  Tobacco user, [facility], 10/14/20  10:01:00 CDT  Office/Outpatient Visit Level 4 Established 88231 PC, HIV disease  Need for vaccination  Routine screening for STI (sexually transmitted infection)  Cervical cancer screening  Contraception management  Cancer screening  Tobacco user, [facility], 10/14/20 10:00:00 CDT  ?  Referrals  [facility] Internal Referral to Gynecology Clinic, Specialty: Gynecology, Reason: Hx of cervical dysplasia with colpo 11/2019, repeated pap 10/14/20 results pending; desires BTL, Start: 10/14/20 10:04:00 CDT  [facility] Internal Referral to Ophthalmology Fundus Clinic, Specialty: Ophthalmology, Reason: fundus photo, Start: 10/14/20 10:11:00 CDT  Clinic Follow up, *Est. 01/14/21 3:00:00 CST, Order for future visit, HIV disease, [facility]   Problem List/Past Medical History  Ongoing  Closed nondisplaced fracture of proximal phalanx of right great toe  HIV disease  Subclinical hyperthyroidism  Historical  Kidney stone  None  Pregnant  Pregnant  Pregnant  Pregnant  Procedure/Surgical History  Incision and drainage of abscess (eg, carbuncle, suppurative hidradenitis, cutaneous or subcutaneous abscess, cyst, furuncle, or paronychia); simple or single (01/19/2018)  Right Kidney Removal (05/09/2014)  right nephrectomy (05/09/2014)   Medications  Gardasil 9 intramuscular suspension, 0.5 mL, IM, Once-Unscheduled  influenza virus vaccine, inactivated quadrivalent intramuscular suspension, 0.5 mL, IM, Once  methocarbamol 750 mg oral tablet, 1500 mg= 2 tab(s), Oral, TID  ondansetron 4 mg oral tablet, disintegrating, 4 mg= 1 tab(s), Oral, q6hr  ondansetron 8 mg oral tablet, disintegrating, 8 mg= 1 tab(s), Oral, q6hr  Toradol 10 mg oral tablet, 10 mg= 1 tab(s), Oral, QID  traMADol 50 mg oral tablet, 50 mg= 1 tab(s), Oral, q6hr  traZODONE 50 mg oral tablet ( Desyrel ), 50 mg= 1 tab(s), Oral, Once a day (at bedtime), 1 refills  Triumeq oral tablet, See Instructions, 3 refills  Allergies  Bactrim  Social History  Abuse/Neglect  No,  09/22/2020  Alcohol  Past, Beer, 12/09/2019  Employment/School  Unemployed, 09/10/2019  Exercise  Exercise frequency: 3-4 times/week. Exercise type: Walking., 09/10/2019  Home/Environment  Lives with Mother, Siblings. Living situation: Home/Independent. Walker/Cane, Wheelchair, Single family house, Risks in environment: Pets/Animal exposure., 09/10/2019  Nutrition/Health  Regular, Good, 09/10/2019  Sexual  Sexually active: No. Gender Identity Identifies as female., 09/10/2019  Spiritual/Cultural  non denomination, 09/10/2019  Substance Use  Past, Cocaine, Marijuana, 09/22/2020  Tobacco - High Risk, 01/06/2015  4 or less cigarettes(less than 1/4 pack)/day in last 30 days, Cigarettes, No, 10/14/2020  Family History  Alcohol user: Father and Maternal Grandfather.  Arthritis: Mother.  CAD (coronary artery disease): Grandmother.  CARDIOMYOPATHY: Grandfather.  COPD (chronic obstructive pulmonary disease).: Grandfather.  Depression.: Mother and Maternal Grandmother.  Diabetes mellitus type 2: Grandmother and Maternal Grandmother.  Heart disease: Father.  Hyperlipidemia.: Mother.  Hypertension.: Maternal Grandmother.  Kidney disease: Grandfather.  Migraine: Mother.  Substance user: Father.  Immunizations  Vaccine Date Status   pneumococcal 23-polyvalent vaccine 12/09/2019 Given   meningococcal conjugate vaccine 12/09/2019 Given   influenza virus vaccine, inactivated 10/09/2019 Given   human papillomavirus vaccine 10/09/2019 Given   human papillomavirus vaccine 08/12/2019 Given   pneumococcal 13-valent conjugate vaccine 08/12/2019 Given   tetanus/diphtheria/pertussis, acel(Tdap) 03/24/2009 Recorded   hepatitis B pediatric vaccine 10/06/1998 Recorded   poliovirus vaccine, live, trivalent 03/23/1998 Recorded   Health Maintenance  Health Maintenance  ???Pending?(in the next year)  ??? ??Due?  ??? ? ? ?Influenza Vaccine due??10/01/20??and every 1??day(s)  ??? ??Refused?  ??? ? ? ?Tetanus Vaccine due??03/24/19??and every  10??year(s)  ??? ??Due In Future?  ??? ? ? ?Obesity Screening not due until??01/01/21??and every 1??year(s)  ??? ? ? ?Smoking Cessation not due until??01/01/21??and every 1??year(s)  ??? ? ? ?Alcohol Misuse Screening not due until??01/02/21??and every 1??year(s)  ???Satisfied?(in the past 1 year)  ??? ??Satisfied?  ??? ? ? ?ADL Screening on??10/14/20.??Satisfied by Chris Vargas  ??? ? ? ?Alcohol Misuse Screening on??09/22/20.??Satisfied by Jarred Curtis NP  ??? ? ? ?Blood Pressure Screening on??10/14/20.??Satisfied by Chris Vargas  ??? ? ? ?Body Mass Index Check on??10/14/20.??Satisfied by Chris Vargas  ??? ? ? ?Depression Screening on??10/14/20.??Satisfied by Chris Vargas  ??? ? ? ?Influenza Vaccine on??10/14/20.??Satisfied by Ashley Quiroz  ??? ? ? ?Obesity Screening on??10/14/20.??Satisfied by Chris Vargas  ??? ? ? ?Smoking Cessation on??09/22/20.??Satisfied by Jarred Curtis NP  ??? ??Refused?  ??? ? ? ?Tetanus Vaccine on??09/22/20.??Recorded by Helen Brunner LPN??Reason: Patient Refuses  ?  cervical pap 11/19 colpo, 10/14/20  mammogram N/A  anal pap 10/19 QNS, 10/14/20  anal ct/gc 10/19 neg  oral ct/gc 10/19 neg, 10/20  cervical/vag ct/gc 10/19 neg, 10/20  ophth  Lab Results  Test Name Test Result Date/Time   Creatinine 1.20 mg/dL (High) 07/17/2020 19:04 CDT   eGFR-SONU 55.3 mL/min/1.73 m2 07/17/2020 19:04 CDT   AST 17 U/L 07/17/2020 19:04 CDT   ALT 19 U/L 07/17/2020 19:04 CDT   U Beta hCG Ql Negative 07/17/2020 18:29 CDT   WBC 3.7 x10(3)/mcL (Low) 08/04/2020 09:49 CDT   Hgb 12.3 gm/dL 08/04/2020 09:49 CDT   Hct 40.2 % 08/04/2020 09:49 CDT   Platelet 210 x10(3)/mcL 08/04/2020 09:49 CDT   % CD4-Q 40 % 08/04/2020 09:49 CDT   Abs CD4+Cells-Q 571 cells/uL 08/04/2020 09:49 CDT   RPR Non-Reactive 12/09/2019 10:20 CST   HIV1 RNA Qn PCR-Q <20 NOT DETECTED 08/04/2020 09:49 CDT
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
## 2022-05-04 ENCOUNTER — LAB VISIT (OUTPATIENT)
Dept: LAB | Facility: HOSPITAL | Age: 37
End: 2022-05-04
Attending: NURSE PRACTITIONER
Payer: MEDICAID

## 2022-05-04 DIAGNOSIS — E05.90 SUBCLINICAL HYPERTHYROIDISM: ICD-10-CM

## 2022-05-04 DIAGNOSIS — B20 HIV DISEASE: ICD-10-CM

## 2022-05-04 PROBLEM — Z21 HIV INFECTION: Status: ACTIVE | Noted: 2022-05-04

## 2022-05-04 PROBLEM — Z72.0 TOBACCO USE: Status: ACTIVE | Noted: 2022-05-04

## 2022-05-04 PROBLEM — S92.919A CLOSED FRACTURE OF PHALANX OF TOE: Status: ACTIVE | Noted: 2022-05-04

## 2022-05-04 LAB
ALBUMIN SERPL-MCNC: 4 GM/DL (ref 3.5–5)
ALBUMIN/GLOB SERPL: 1.3 RATIO (ref 1.1–2)
ALP SERPL-CCNC: 54 UNIT/L (ref 40–150)
ALT SERPL-CCNC: 16 UNIT/L (ref 0–55)
APPEARANCE UR: CLEAR
AST SERPL-CCNC: 18 UNIT/L (ref 5–34)
BACTERIA #/AREA URNS AUTO: ABNORMAL /HPF
BASOPHILS # BLD AUTO: 0.03 X10(3)/MCL (ref 0–0.2)
BASOPHILS NFR BLD AUTO: 0.7 %
BILIRUB UR QL STRIP.AUTO: NEGATIVE MG/DL
BILIRUBIN DIRECT+TOT PNL SERPL-MCNC: 0 MG/DL (ref 0–0.8)
BILIRUBIN DIRECT+TOT PNL SERPL-MCNC: 0.1 MG/DL
BILIRUBIN DIRECT+TOT PNL SERPL-MCNC: 0.1 MG/DL (ref 0–0.5)
BUN SERPL-MCNC: 11.9 MG/DL (ref 7–18.7)
CALCIUM SERPL-MCNC: 9.7 MG/DL (ref 8.4–10.2)
CHLORIDE SERPL-SCNC: 107 MMOL/L (ref 98–107)
CO2 SERPL-SCNC: 28 MMOL/L (ref 22–29)
COLOR UR AUTO: ABNORMAL
CREAT SERPL-MCNC: 1.1 MG/DL (ref 0.55–1.02)
EOSINOPHIL # BLD AUTO: 0.13 X10(3)/MCL (ref 0–0.9)
EOSINOPHIL NFR BLD AUTO: 2.8 %
ERYTHROCYTE [DISTWIDTH] IN BLOOD BY AUTOMATED COUNT: 13.2 % (ref 11.5–17)
GLOBULIN SER-MCNC: 3.1 GM/DL (ref 2.4–3.5)
GLUCOSE SERPL-MCNC: 63 MG/DL (ref 74–100)
GLUCOSE UR QL STRIP.AUTO: NORMAL MG/DL
HCT VFR BLD AUTO: 42.9 % (ref 37–47)
HGB BLD-MCNC: 13.7 GM/DL (ref 12–16)
HYALINE CASTS #/AREA URNS LPF: ABNORMAL /LPF
IMM GRANULOCYTES # BLD AUTO: 0.01 X10(3)/MCL (ref 0–0.02)
IMM GRANULOCYTES NFR BLD AUTO: 0.2 % (ref 0–0.43)
KETONES UR QL STRIP.AUTO: NEGATIVE MG/DL
LEUKOCYTE ESTERASE UR QL STRIP.AUTO: NEGATIVE UNIT/L
LYMPHOCYTES # BLD AUTO: 1.76 X10(3)/MCL (ref 0.6–4.6)
LYMPHOCYTES NFR BLD AUTO: 38.3 %
MCH RBC QN AUTO: 33.5 PG (ref 27–31)
MCHC RBC AUTO-ENTMCNC: 31.9 MG/DL (ref 33–36)
MCV RBC AUTO: 104.9 FL (ref 80–94)
MONOCYTES # BLD AUTO: 0.57 X10(3)/MCL (ref 0.1–1.3)
MONOCYTES NFR BLD AUTO: 12.4 %
MUCOUS THREADS URNS QL MICRO: ABNORMAL /LPF
NEUTROPHILS # BLD AUTO: 2.1 X10(3)/MCL (ref 2.1–9.2)
NEUTROPHILS NFR BLD AUTO: 45.6 %
NITRITE UR QL STRIP.AUTO: NEGATIVE
NRBC BLD AUTO-RTO: 0 %
PH UR STRIP.AUTO: 5.5 [PH]
PLATELET # BLD AUTO: 190 X10(3)/MCL (ref 130–400)
PMV BLD AUTO: 10.7 FL (ref 9.4–12.4)
POTASSIUM SERPL-SCNC: 4.4 MMOL/L (ref 3.5–5.1)
PROT SERPL-MCNC: 7.1 GM/DL (ref 6.4–8.3)
PROT UR QL STRIP.AUTO: NEGATIVE MG/DL
RBC # BLD AUTO: 4.09 X10(6)/MCL (ref 4.2–5.4)
RBC UR QL AUTO: NEGATIVE UNIT/L
SODIUM SERPL-SCNC: 141 MMOL/L (ref 136–145)
SP GR UR STRIP.AUTO: 1.02
SQUAMOUS #/AREA URNS LPF: ABNORMAL /HPF
T4 FREE SERPL-MCNC: 0.78 NG/DL (ref 0.7–1.48)
TSH SERPL-ACNC: 0.42 UIU/ML (ref 0.35–4.94)
UROBILINOGEN UR STRIP-ACNC: 0.2 MG/DL
WBC # SPEC AUTO: 4.6 X10(3)/MCL (ref 4.5–11.5)
WBC #/AREA URNS AUTO: ABNORMAL /HPF

## 2022-05-04 PROCEDURE — 85025 COMPLETE CBC W/AUTO DIFF WBC: CPT

## 2022-05-04 PROCEDURE — 80053 COMPREHEN METABOLIC PANEL: CPT

## 2022-05-04 PROCEDURE — 84443 ASSAY THYROID STIM HORMONE: CPT

## 2022-05-04 PROCEDURE — 84439 ASSAY OF FREE THYROXINE: CPT

## 2022-05-04 PROCEDURE — 81001 URINALYSIS AUTO W/SCOPE: CPT

## 2022-05-04 PROCEDURE — 36415 COLL VENOUS BLD VENIPUNCTURE: CPT
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
## 2022-05-05 ENCOUNTER — OFFICE VISIT (OUTPATIENT)
Dept: INTERNAL MEDICINE | Facility: CLINIC | Age: 37
End: 2022-05-05
Payer: MEDICAID

## 2022-05-05 VITALS
RESPIRATION RATE: 20 BRPM | TEMPERATURE: 98 F | HEIGHT: 61 IN | BODY MASS INDEX: 19.94 KG/M2 | HEART RATE: 76 BPM | DIASTOLIC BLOOD PRESSURE: 67 MMHG | SYSTOLIC BLOOD PRESSURE: 102 MMHG | WEIGHT: 105.63 LBS

## 2022-05-05 DIAGNOSIS — Z13.1 SCREENING FOR DIABETES MELLITUS: ICD-10-CM

## 2022-05-05 DIAGNOSIS — E05.90 SUBCLINICAL HYPERTHYROIDISM: ICD-10-CM

## 2022-05-05 DIAGNOSIS — Z72.0 TOBACCO USE: ICD-10-CM

## 2022-05-05 DIAGNOSIS — Z00.00 WELLNESS EXAMINATION: ICD-10-CM

## 2022-05-05 DIAGNOSIS — Z11.3 ROUTINE SCREENING FOR STI (SEXUALLY TRANSMITTED INFECTION): ICD-10-CM

## 2022-05-05 DIAGNOSIS — G47.00 INSOMNIA, UNSPECIFIED TYPE: ICD-10-CM

## 2022-05-05 DIAGNOSIS — R60.9 EDEMA, UNSPECIFIED TYPE: ICD-10-CM

## 2022-05-05 DIAGNOSIS — D64.9 ANEMIA, UNSPECIFIED TYPE: ICD-10-CM

## 2022-05-05 DIAGNOSIS — B20 HIV INFECTION, UNSPECIFIED SYMPTOM STATUS: Primary | ICD-10-CM

## 2022-05-05 PROCEDURE — 99213 PR OFFICE/OUTPT VISIT, EST, LEVL III, 20-29 MIN: ICD-10-PCS | Mod: S$PBB,,, | Performed by: NURSE PRACTITIONER

## 2022-05-05 PROCEDURE — 99214 OFFICE O/P EST MOD 30 MIN: CPT | Mod: PBBFAC | Performed by: NURSE PRACTITIONER

## 2022-05-05 PROCEDURE — 3074F SYST BP LT 130 MM HG: CPT | Mod: CPTII,,, | Performed by: NURSE PRACTITIONER

## 2022-05-05 PROCEDURE — 3078F DIAST BP <80 MM HG: CPT | Mod: CPTII,,, | Performed by: NURSE PRACTITIONER

## 2022-05-05 PROCEDURE — 99213 OFFICE O/P EST LOW 20 MIN: CPT | Mod: S$PBB,,, | Performed by: NURSE PRACTITIONER

## 2022-05-05 PROCEDURE — 1159F PR MEDICATION LIST DOCUMENTED IN MEDICAL RECORD: ICD-10-PCS | Mod: CPTII,,, | Performed by: NURSE PRACTITIONER

## 2022-05-05 PROCEDURE — 1159F MED LIST DOCD IN RCRD: CPT | Mod: CPTII,,, | Performed by: NURSE PRACTITIONER

## 2022-05-05 PROCEDURE — 3078F PR MOST RECENT DIASTOLIC BLOOD PRESSURE < 80 MM HG: ICD-10-PCS | Mod: CPTII,,, | Performed by: NURSE PRACTITIONER

## 2022-05-05 PROCEDURE — 3008F PR BODY MASS INDEX (BMI) DOCUMENTED: ICD-10-PCS | Mod: CPTII,,, | Performed by: NURSE PRACTITIONER

## 2022-05-05 PROCEDURE — 3074F PR MOST RECENT SYSTOLIC BLOOD PRESSURE < 130 MM HG: ICD-10-PCS | Mod: CPTII,,, | Performed by: NURSE PRACTITIONER

## 2022-05-05 PROCEDURE — 3008F BODY MASS INDEX DOCD: CPT | Mod: CPTII,,, | Performed by: NURSE PRACTITIONER

## 2022-05-05 RX ORDER — FERROUS SULFATE TAB 325 MG (65 MG ELEMENTAL FE) 325 (65 FE) MG
TAB ORAL
COMMUNITY
Start: 2022-04-29 | End: 2022-06-02

## 2022-05-05 RX ORDER — CHOLECALCIFEROL (VITAMIN D3) 25 MCG
5000 TABLET ORAL DAILY
COMMUNITY
End: 2022-11-30 | Stop reason: HOSPADM

## 2022-05-05 RX ORDER — TRAZODONE HYDROCHLORIDE 100 MG/1
TABLET ORAL
COMMUNITY
Start: 2022-04-29 | End: 2022-07-20

## 2022-05-05 RX ORDER — CHLORHEXIDINE GLUCONATE ORAL RINSE 1.2 MG/ML
15 SOLUTION DENTAL DAILY PRN
COMMUNITY
Start: 2021-11-04

## 2022-05-05 RX ORDER — ABACAVIR SULFATE, DOLUTEGRAVIR SODIUM, LAMIVUDINE 600; 50; 300 MG/1; MG/1; MG/1
TABLET, FILM COATED ORAL
COMMUNITY
Start: 2021-10-11 | End: 2022-08-05 | Stop reason: SDUPTHER

## 2022-05-05 NOTE — ASSESSMENT & PLAN NOTE
CBC stable; continue iron supplements  Since MCH and MCV slightly elevated, will check B12 and Folate with next labs

## 2022-06-28 ENCOUNTER — HOSPITAL ENCOUNTER (EMERGENCY)
Facility: HOSPITAL | Age: 37
Discharge: HOME | End: 2022-06-28
Attending: FAMILY MEDICINE
Payer: MEDICAID

## 2022-06-28 VITALS
HEART RATE: 75 BPM | SYSTOLIC BLOOD PRESSURE: 117 MMHG | WEIGHT: 101.63 LBS | OXYGEN SATURATION: 99 % | RESPIRATION RATE: 16 BRPM | BODY MASS INDEX: 19.19 KG/M2 | DIASTOLIC BLOOD PRESSURE: 77 MMHG | HEIGHT: 61 IN | TEMPERATURE: 98 F

## 2022-06-28 DIAGNOSIS — M79.605 LEFT LEG PAIN: Primary | ICD-10-CM

## 2022-06-28 DIAGNOSIS — I83.12 VARICOSE VEINS OF LEFT LOWER EXTREMITY WITH INFLAMMATION: ICD-10-CM

## 2022-06-28 LAB
ANION GAP SERPL CALC-SCNC: 8 MEQ/L
BASOPHILS # BLD AUTO: 0.02 X10(3)/MCL (ref 0–0.2)
BASOPHILS NFR BLD AUTO: 0.4 %
BUN SERPL-MCNC: 15.8 MG/DL (ref 7–18.7)
CALCIUM SERPL-MCNC: 9.1 MG/DL (ref 8.4–10.2)
CHLORIDE SERPL-SCNC: 108 MMOL/L (ref 98–107)
CO2 SERPL-SCNC: 24 MMOL/L (ref 22–29)
CREAT SERPL-MCNC: 1.26 MG/DL (ref 0.55–1.02)
CREAT/UREA NIT SERPL: 13
EOSINOPHIL # BLD AUTO: 0.18 X10(3)/MCL (ref 0–0.9)
EOSINOPHIL NFR BLD AUTO: 3.2 %
ERYTHROCYTE [DISTWIDTH] IN BLOOD BY AUTOMATED COUNT: 12.3 % (ref 11.5–17)
GLUCOSE SERPL-MCNC: 89 MG/DL (ref 74–100)
HCT VFR BLD AUTO: 41.5 % (ref 37–47)
HGB BLD-MCNC: 14.1 GM/DL (ref 12–16)
IMM GRANULOCYTES # BLD AUTO: 0.01 X10(3)/MCL (ref 0–0.02)
IMM GRANULOCYTES NFR BLD AUTO: 0.2 % (ref 0–0.43)
LYMPHOCYTES # BLD AUTO: 1.56 X10(3)/MCL (ref 0.6–4.6)
LYMPHOCYTES NFR BLD AUTO: 28.2 %
MCH RBC QN AUTO: 35.3 PG (ref 27–31)
MCHC RBC AUTO-ENTMCNC: 34 MG/DL (ref 33–36)
MCV RBC AUTO: 103.8 FL (ref 80–94)
MONOCYTES # BLD AUTO: 0.3 X10(3)/MCL (ref 0.1–1.3)
MONOCYTES NFR BLD AUTO: 5.4 %
NEUTROPHILS # BLD AUTO: 3.5 X10(3)/MCL (ref 2.1–9.2)
NEUTROPHILS NFR BLD AUTO: 62.6 %
NRBC BLD AUTO-RTO: 0 %
PLATELET # BLD AUTO: 196 X10(3)/MCL (ref 130–400)
PMV BLD AUTO: 10.3 FL (ref 9.4–12.4)
POTASSIUM SERPL-SCNC: 4.2 MMOL/L (ref 3.5–5.1)
RBC # BLD AUTO: 4 X10(6)/MCL (ref 4.2–5.4)
SODIUM SERPL-SCNC: 140 MMOL/L (ref 136–145)
WBC # SPEC AUTO: 5.5 X10(3)/MCL (ref 4.5–11.5)

## 2022-06-28 PROCEDURE — 85610 PROTHROMBIN TIME: CPT | Performed by: NURSE PRACTITIONER

## 2022-06-28 PROCEDURE — 36415 COLL VENOUS BLD VENIPUNCTURE: CPT | Performed by: NURSE PRACTITIONER

## 2022-06-28 PROCEDURE — 99284 EMERGENCY DEPT VISIT MOD MDM: CPT | Mod: 25

## 2022-06-28 PROCEDURE — 85025 COMPLETE CBC W/AUTO DIFF WBC: CPT | Performed by: NURSE PRACTITIONER

## 2022-06-28 PROCEDURE — 80048 BASIC METABOLIC PNL TOTAL CA: CPT | Performed by: NURSE PRACTITIONER

## 2022-06-28 RX ORDER — DICLOFENAC SODIUM 75 MG/1
75 TABLET, DELAYED RELEASE ORAL 2 TIMES DAILY
Qty: 14 TABLET | Refills: 0 | Status: SHIPPED | OUTPATIENT
Start: 2022-06-28 | End: 2022-07-05

## 2022-06-28 NOTE — ED PROVIDER NOTES
"Encounter Date: 6/28/2022       History     Chief Complaint   Patient presents with    Leg Pain     PT W CO TENDER "LUMP" TO VARICOSE VEIN NEAR LT KNEE > 2 MONTHS.       Pt is a 36 y.o. female who presents to the [facility] ED complaining of an area of pain and swelling to her Lt lower leg. Reports noting symptoms yesterday. 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 Denies chest pain, SOB, weakness, dizziness, fever, abdominal pain, or loss of bowel or bladder control.        Review of patient's allergies indicates:   Allergen Reactions    Sulfamethoxazole-trimethoprim Hives     Past Medical History:   Diagnosis Date    Human immunodeficiency virus (HIV) disease      Past Surgical History:   Procedure Laterality Date    NEPHRECTOMY  05/19/2014    right    VASCULAR SURGERY       Family History   Problem Relation Age of Onset    Arthritis Mother     Depression Mother     Hyperlipidemia Mother     Migraines Mother     Heart disease Father     Depression Maternal Grandmother     Diabetes Maternal Grandmother     Hypertension Maternal Grandmother     Coronary artery disease Paternal Grandmother     Diabetes Paternal Grandmother     Cardiomyopathy Paternal Grandfather     COPD Paternal Grandfather     Kidney disease Paternal Grandfather      Social History     Tobacco Use    Smoking status: Current Every Day Smoker     Packs/day: 1.00     Types: Cigarettes    Smokeless tobacco: Never Used   Substance Use Topics    Alcohol use: Not Currently    Drug use: Yes     Frequency: 7.0 times per week     Types: Marijuana     Review of Systems   Constitutional: Negative for chills, diaphoresis, fatigue and fever.   HENT: Negative for facial swelling, rhinorrhea, sinus pressure, sinus pain, sore throat and trouble swallowing.    Respiratory: Negative for cough, chest " tightness, shortness of breath and wheezing.    Cardiovascular: Negative for chest pain, palpitations and leg swelling.   Gastrointestinal: Negative for abdominal pain, diarrhea, nausea and vomiting.   Genitourinary: Negative for dysuria, flank pain, frequency, hematuria and urgency.   Musculoskeletal: Positive for myalgias. Negative for arthralgias, back pain and joint swelling.   Skin: Negative for color change, rash and wound.   Neurological: Negative for dizziness, syncope, weakness and light-headedness.   Hematological: Does not bruise/bleed easily.   All other systems reviewed and are negative.      Physical Exam     Initial Vitals [06/28/22 0828]   BP Pulse Resp Temp SpO2   117/77 75 16 97.9 °F (36.6 °C) 99 %      MAP       --         Physical Exam    Nursing note and vitals reviewed.  Constitutional: She appears well-developed and well-nourished.   HENT:   Head: Normocephalic and atraumatic.   Nose: Nose normal.   Mouth/Throat: Oropharynx is clear and moist.   Eyes: Conjunctivae and EOM are normal. Pupils are equal, round, and reactive to light.   Neck: Neck supple.   Normal range of motion.  Cardiovascular: Normal rate, regular rhythm, normal heart sounds and intact distal pulses.   Pulmonary/Chest: Effort normal and breath sounds normal. No respiratory distress. She has no wheezes. She has no rhonchi. She has no rales. She exhibits no tenderness.   Abdominal: Abdomen is soft and flat. Bowel sounds are normal. She exhibits no distension. There is no abdominal tenderness. There is no rebound, no guarding, no tenderness at McBurney's point and negative Villarreal's sign. negative psoas sign  Musculoskeletal:         General: Normal range of motion.      Cervical back: Normal range of motion and neck supple.      Left lower leg: Tenderness present. No edema.        Legs:       Comments: Multiple varicose veins present across lower legs. Calves symmetrical bilaterally. (+) pedal pulses.     Neurological: She is  alert and oriented to person, place, and time. She has normal strength and normal reflexes.   Skin: Skin is warm and dry. Capillary refill takes less than 2 seconds.   Psychiatric: She has a normal mood and affect. Her speech is normal and behavior is normal. Judgment and thought content normal.         ED Course   Procedures  Labs Reviewed   BASIC METABOLIC PANEL - Abnormal; Notable for the following components:       Result Value    Chloride 108 (*)     Creatinine 1.26 (*)     All other components within normal limits   CBC WITH DIFFERENTIAL - Abnormal; Notable for the following components:    RBC 4.00 (*)     .8 (*)     MCH 35.3 (*)     All other components within normal limits   CBC W/ AUTO DIFFERENTIAL    Narrative:     The following orders were created for panel order CBC auto differential.  Procedure                               Abnormality         Status                     ---------                               -----------         ------                     CBC with Differential[[id number]]        Abnormal            Final result                 Please view results for these tests on the individual orders.   PROTIME-INR   EXTRA TUBES    Narrative:     The following orders were created for panel order EXTRA TUBES.  Procedure                               Abnormality         Status                     ---------                               -----------         ------                     Gold Top Hold[[id number]]                                    In process                   Please view results for these tests on the individual orders.   GOLD TOP HOLD          Imaging Results    None     CV US Doppler Venous Leg Left  Reason for Exam  Priority: STAT  Dx: Left leg pain [M79.605 (ICD-10-CM)]       Conclusion       · There is no evidence of a left lower extremity DVT.  · The contralateral (left) common femoral vein is patent.     No evidence of deep thrombosis seen in the left lower extremity.    The left  greater saphenous vein is not identified and the patient gives history of a GSV ablation.  Area of complaint on top of left tibia was scanned and compressible vessels were noted.           Medications - No data to display  Medical Decision Making:   Differential Diagnosis:   Hematoma  DVT  Clinical Tests:   Lab Tests: Ordered  Radiological Study: Ordered and Reviewed  ED Management:  DVT  Hematoma  Leg pain  Ruptured varicose vein  10:22 AM Reassessed patient at this time. Reports condition has improved. Discussed with patient all pertinent ED information and results. Discussed diagnosis and treatment plan with patient. Follow up instructions and return to ED instruction have been given. All questions and concerns were addressed at this time. Patient voices understanding of information and instructions. Patient is comfortable with plan and discharge. Patient is stable for discharge.                         Clinical Impression:   Final diagnoses:  [M79.605] Left leg pain (Primary)  [I83.12] Varicose veins of left lower extremity with inflammation          ED Disposition Condition    Discharge Stable        ED Prescriptions     Medication Sig Dispense Start Date End Date Auth. Provider    diclofenac (VOLTAREN) 75 MG EC tablet Take 1 tablet (75 mg total) by mouth 2 (two) times daily. for 7 days 14 tablet 6/28/2022 7/5/2022 MONICO Villegas Jr.        Follow-up Information     Follow up With Specialties Details Why Contact Info    [facility] In 1 week  [address] [facility] [address]  [phone]      [facility] Emergency Medicine In 3 days As needed, If symptoms worsen [address] [facility] [id number] [phone]           MONICO Villegas Jr.  06/28/22 1024

## 2022-06-28 NOTE — DISCHARGE INSTRUCTIONS
Follow up with PCP in 5-7 days for additional evaluation.  Warm compresses to affected areas and soak in Epsom Salt for comfort.  Take pain medication as directed for up to 7 days.  Continue to use compression stockings as previously directed.

## 2022-07-20 ENCOUNTER — TELEPHONE (OUTPATIENT)
Dept: INFECTIOUS DISEASES | Facility: CLINIC | Age: 37
End: 2022-07-20
Payer: MEDICAID

## 2022-07-20 RX ORDER — ABACAVIR SULFATE, DOLUTEGRAVIR SODIUM, LAMIVUDINE 600; 50; 300 MG/1; MG/1; MG/1
1 TABLET, FILM COATED ORAL DAILY
Qty: 1 TABLET | Refills: 0 | OUTPATIENT
Start: 2022-07-20

## 2022-08-05 ENCOUNTER — OFFICE VISIT (OUTPATIENT)
Dept: INFECTIOUS DISEASES | Facility: CLINIC | Age: 37
End: 2022-08-05
Payer: MEDICAID

## 2022-08-05 VITALS
DIASTOLIC BLOOD PRESSURE: 74 MMHG | HEART RATE: 83 BPM | BODY MASS INDEX: 19.32 KG/M2 | TEMPERATURE: 98 F | RESPIRATION RATE: 20 BRPM | SYSTOLIC BLOOD PRESSURE: 104 MMHG | WEIGHT: 102.31 LBS | HEIGHT: 61 IN

## 2022-08-05 DIAGNOSIS — B20 HIV DISEASE: ICD-10-CM

## 2022-08-05 DIAGNOSIS — E55.9 VITAMIN D DEFICIENCY: ICD-10-CM

## 2022-08-05 DIAGNOSIS — F17.219 CIGARETTE NICOTINE DEPENDENCE WITH NICOTINE-INDUCED DISORDER: ICD-10-CM

## 2022-08-05 DIAGNOSIS — Z72.0 TOBACCO USE: ICD-10-CM

## 2022-08-05 DIAGNOSIS — J34.89 INTERNAL NASAL LESION: ICD-10-CM

## 2022-08-05 DIAGNOSIS — B20 HIV DISEASE: Primary | ICD-10-CM

## 2022-08-05 LAB
ALBUMIN SERPL-MCNC: 4.3 GM/DL (ref 3.5–5)
ALBUMIN/GLOB SERPL: 1.4 RATIO (ref 1.1–2)
ALP SERPL-CCNC: 53 UNIT/L (ref 40–150)
ALT SERPL-CCNC: 16 UNIT/L (ref 0–55)
AST SERPL-CCNC: 17 UNIT/L (ref 5–34)
BASOPHILS # BLD AUTO: 0.02 X10(3)/MCL (ref 0–0.2)
BASOPHILS NFR BLD AUTO: 0.3 %
BILIRUBIN DIRECT+TOT PNL SERPL-MCNC: 0.4 MG/DL
BUN SERPL-MCNC: 15.4 MG/DL (ref 7–18.7)
CALCIUM SERPL-MCNC: 9.6 MG/DL (ref 8.4–10.2)
CHLORIDE SERPL-SCNC: 106 MMOL/L (ref 98–107)
CO2 SERPL-SCNC: 25 MMOL/L (ref 22–29)
CREAT SERPL-MCNC: 1.17 MG/DL (ref 0.55–1.02)
EOSINOPHIL # BLD AUTO: 0.18 X10(3)/MCL (ref 0–0.9)
EOSINOPHIL NFR BLD AUTO: 3.1 %
ERYTHROCYTE [DISTWIDTH] IN BLOOD BY AUTOMATED COUNT: 12.3 % (ref 11.5–17)
GLOBULIN SER-MCNC: 3.1 GM/DL (ref 2.4–3.5)
GLUCOSE SERPL-MCNC: 84 MG/DL (ref 74–100)
HCT VFR BLD AUTO: 44.9 % (ref 37–47)
HGB BLD-MCNC: 14.9 GM/DL (ref 12–16)
IMM GRANULOCYTES # BLD AUTO: 0.01 X10(3)/MCL (ref 0–0.04)
IMM GRANULOCYTES NFR BLD AUTO: 0.2 %
LYMPHOCYTES # BLD AUTO: 1.83 X10(3)/MCL (ref 0.6–4.6)
LYMPHOCYTES NFR BLD AUTO: 31 %
MCH RBC QN AUTO: 35.2 PG (ref 27–31)
MCHC RBC AUTO-ENTMCNC: 33.2 MG/DL (ref 33–36)
MCV RBC AUTO: 106.1 FL (ref 80–94)
MONOCYTES # BLD AUTO: 0.29 X10(3)/MCL (ref 0.1–1.3)
MONOCYTES NFR BLD AUTO: 4.9 %
NEUTROPHILS # BLD AUTO: 3.6 X10(3)/MCL (ref 2.1–9.2)
NEUTROPHILS NFR BLD AUTO: 60.5 %
NRBC BLD AUTO-RTO: 0 %
PLATELET # BLD AUTO: 221 X10(3)/MCL (ref 130–400)
PMV BLD AUTO: 10.5 FL (ref 7.4–10.4)
POTASSIUM SERPL-SCNC: 4.1 MMOL/L (ref 3.5–5.1)
PROT SERPL-MCNC: 7.4 GM/DL (ref 6.4–8.3)
RBC # BLD AUTO: 4.23 X10(6)/MCL (ref 4.2–5.4)
SODIUM SERPL-SCNC: 141 MMOL/L (ref 136–145)
WBC # SPEC AUTO: 5.9 X10(3)/MCL (ref 4.5–11.5)

## 2022-08-05 PROCEDURE — 3074F PR MOST RECENT SYSTOLIC BLOOD PRESSURE < 130 MM HG: ICD-10-PCS | Mod: CPTII,,, | Performed by: NURSE PRACTITIONER

## 2022-08-05 PROCEDURE — 1160F PR REVIEW ALL MEDS BY PRESCRIBER/CLIN PHARMACIST DOCUMENTED: ICD-10-PCS | Mod: CPTII,,, | Performed by: NURSE PRACTITIONER

## 2022-08-05 PROCEDURE — 3078F PR MOST RECENT DIASTOLIC BLOOD PRESSURE < 80 MM HG: ICD-10-PCS | Mod: CPTII,,, | Performed by: NURSE PRACTITIONER

## 2022-08-05 PROCEDURE — 3074F SYST BP LT 130 MM HG: CPT | Mod: CPTII,,, | Performed by: NURSE PRACTITIONER

## 2022-08-05 PROCEDURE — 3008F PR BODY MASS INDEX (BMI) DOCUMENTED: ICD-10-PCS | Mod: CPTII,,, | Performed by: NURSE PRACTITIONER

## 2022-08-05 PROCEDURE — 99406 BEHAV CHNG SMOKING 3-10 MIN: CPT | Mod: S$PBB,,, | Performed by: NURSE PRACTITIONER

## 2022-08-05 PROCEDURE — 99214 PR OFFICE/OUTPT VISIT, EST, LEVL IV, 30-39 MIN: ICD-10-PCS | Mod: 25,S$PBB,, | Performed by: NURSE PRACTITIONER

## 2022-08-05 PROCEDURE — 99214 OFFICE O/P EST MOD 30 MIN: CPT | Mod: 25,S$PBB,, | Performed by: NURSE PRACTITIONER

## 2022-08-05 PROCEDURE — 99406 PR TOBACCO USE CESSATION INTERMEDIATE 3-10 MINUTES: ICD-10-PCS | Mod: S$PBB,,, | Performed by: NURSE PRACTITIONER

## 2022-08-05 PROCEDURE — 36415 COLL VENOUS BLD VENIPUNCTURE: CPT | Performed by: NURSE PRACTITIONER

## 2022-08-05 PROCEDURE — 3008F BODY MASS INDEX DOCD: CPT | Mod: CPTII,,, | Performed by: NURSE PRACTITIONER

## 2022-08-05 PROCEDURE — 1160F RVW MEDS BY RX/DR IN RCRD: CPT | Mod: CPTII,,, | Performed by: NURSE PRACTITIONER

## 2022-08-05 PROCEDURE — 1159F MED LIST DOCD IN RCRD: CPT | Mod: CPTII,,, | Performed by: NURSE PRACTITIONER

## 2022-08-05 PROCEDURE — 1159F PR MEDICATION LIST DOCUMENTED IN MEDICAL RECORD: ICD-10-PCS | Mod: CPTII,,, | Performed by: NURSE PRACTITIONER

## 2022-08-05 PROCEDURE — 99214 OFFICE O/P EST MOD 30 MIN: CPT | Mod: PBBFAC | Performed by: NURSE PRACTITIONER

## 2022-08-05 PROCEDURE — 3078F DIAST BP <80 MM HG: CPT | Mod: CPTII,,, | Performed by: NURSE PRACTITIONER

## 2022-08-05 PROCEDURE — 80053 COMPREHEN METABOLIC PANEL: CPT | Performed by: NURSE PRACTITIONER

## 2022-08-05 RX ORDER — ABACAVIR SULFATE, DOLUTEGRAVIR SODIUM, LAMIVUDINE 600; 50; 300 MG/1; MG/1; MG/1
1 TABLET, FILM COATED ORAL DAILY
Qty: 30 TABLET | Refills: 4 | Status: SHIPPED | OUTPATIENT
Start: 2022-08-05 | End: 2022-12-20 | Stop reason: SDUPTHER

## 2022-08-05 RX ORDER — MUPIROCIN 20 MG/G
OINTMENT TOPICAL 3 TIMES DAILY
Qty: 15 G | Refills: 1 | Status: SHIPPED | OUTPATIENT
Start: 2022-08-05

## 2022-08-06 LAB — HIV1 RNA # PLAS NAA DL=20: NORMAL COPIES/ML

## 2022-08-08 PROBLEM — Z00.00 WELLNESS EXAMINATION: Status: RESOLVED | Noted: 2022-05-05 | Resolved: 2022-08-08

## 2022-08-08 LAB
AGE: >18
CD3+CD4+ CELLS # BLD: 31 % (ref 28–48)
CD3+CD4+ CELLS # SPEC: 813.9 UNIT/L (ref 589–1505)
CD3+CD4+ CELLS NFR BLD: 44.5 %
LYMPHOCYTES # BLD AUTO: 1829 X10(3)/MCL (ref 1260–5520)
LYMPHOMA - T-CELL MARKERS SPEC-IMP: NORMAL
WBC # BLD AUTO: 5900 /MM3 (ref 4500–11500)

## 2022-08-08 RX ORDER — FERROUS SULFATE 325(65) MG
325 TABLET ORAL DAILY
Qty: 30 TABLET | Refills: 3 | Status: SHIPPED | OUTPATIENT
Start: 2022-08-08 | End: 2022-12-15 | Stop reason: SDUPTHER

## 2022-08-18 ENCOUNTER — TELEPHONE (OUTPATIENT)
Dept: INTERNAL MEDICINE | Facility: CLINIC | Age: 37
End: 2022-08-18

## 2022-08-18 RX ORDER — BUTALBITAL, ACETAMINOPHEN AND CAFFEINE 50; 325; 40 MG/1; MG/1; MG/1
1 TABLET ORAL EVERY 6 HOURS PRN
Qty: 28 TABLET | Refills: 0 | Status: SHIPPED | OUTPATIENT
Start: 2022-08-18 | End: 2022-09-17

## 2022-09-15 ENCOUNTER — HISTORICAL (OUTPATIENT)
Dept: ADMINISTRATIVE | Facility: HOSPITAL | Age: 37
End: 2022-09-15
Payer: MEDICAID

## 2022-09-16 ENCOUNTER — HISTORICAL (OUTPATIENT)
Dept: ADMINISTRATIVE | Facility: HOSPITAL | Age: 37
End: 2022-09-16
Payer: MEDICAID

## 2022-11-22 ENCOUNTER — LAB VISIT (OUTPATIENT)
Dept: LAB | Facility: HOSPITAL | Age: 37
End: 2022-11-22
Attending: NURSE PRACTITIONER
Payer: MEDICAID

## 2022-11-22 DIAGNOSIS — D64.9 ANEMIA, UNSPECIFIED TYPE: ICD-10-CM

## 2022-11-22 DIAGNOSIS — Z00.00 WELLNESS EXAMINATION: ICD-10-CM

## 2022-11-22 LAB
ALBUMIN SERPL-MCNC: 3.7 GM/DL (ref 3.5–5)
ALBUMIN/GLOB SERPL: 1.3 RATIO (ref 1.1–2)
ALP SERPL-CCNC: 63 UNIT/L (ref 40–150)
ALT SERPL-CCNC: 33 UNIT/L (ref 0–55)
AST SERPL-CCNC: 24 UNIT/L (ref 5–34)
BASOPHILS # BLD AUTO: 0.03 X10(3)/MCL (ref 0–0.2)
BASOPHILS NFR BLD AUTO: 0.4 %
BILIRUBIN DIRECT+TOT PNL SERPL-MCNC: 0.3 MG/DL
BUN SERPL-MCNC: 12.6 MG/DL (ref 7–18.7)
CALCIUM SERPL-MCNC: 9 MG/DL (ref 8.4–10.2)
CHLORIDE SERPL-SCNC: 107 MMOL/L (ref 98–107)
CHOLEST SERPL-MCNC: 158 MG/DL
CHOLEST/HDLC SERPL: 3 {RATIO} (ref 0–5)
CO2 SERPL-SCNC: 27 MMOL/L (ref 22–29)
CREAT SERPL-MCNC: 1.12 MG/DL (ref 0.55–1.02)
DEPRECATED CALCIDIOL+CALCIFEROL SERPL-MC: 24.4 NG/ML (ref 30–80)
EOSINOPHIL # BLD AUTO: 0.32 X10(3)/MCL (ref 0–0.9)
EOSINOPHIL NFR BLD AUTO: 4.8 %
ERYTHROCYTE [DISTWIDTH] IN BLOOD BY AUTOMATED COUNT: 12.1 % (ref 11.5–17)
EST. AVERAGE GLUCOSE BLD GHB EST-MCNC: 102.5 MG/DL
FOLATE SERPL-MCNC: 7.5 NG/ML (ref 7–31.4)
GFR SERPLBLD CREATININE-BSD FMLA CKD-EPI: >60 MLS/MIN/1.73/M2
GLOBULIN SER-MCNC: 2.9 GM/DL (ref 2.4–3.5)
GLUCOSE SERPL-MCNC: 108 MG/DL (ref 74–100)
HBA1C MFR BLD: 5.2 %
HCT VFR BLD AUTO: 41.4 % (ref 37–47)
HDLC SERPL-MCNC: 62 MG/DL (ref 35–60)
HGB BLD-MCNC: 13.7 GM/DL (ref 12–16)
IMM GRANULOCYTES # BLD AUTO: 0.02 X10(3)/MCL (ref 0–0.04)
IMM GRANULOCYTES NFR BLD AUTO: 0.3 %
LDLC SERPL CALC-MCNC: 82 MG/DL (ref 50–140)
LYMPHOCYTES # BLD AUTO: 1.87 X10(3)/MCL (ref 0.6–4.6)
LYMPHOCYTES NFR BLD AUTO: 27.9 %
MCH RBC QN AUTO: 35.4 PG (ref 27–31)
MCHC RBC AUTO-ENTMCNC: 33.1 MG/DL (ref 33–36)
MCV RBC AUTO: 107 FL (ref 80–94)
MONOCYTES # BLD AUTO: 0.33 X10(3)/MCL (ref 0.1–1.3)
MONOCYTES NFR BLD AUTO: 4.9 %
NEUTROPHILS # BLD AUTO: 4.1 X10(3)/MCL (ref 2.1–9.2)
NEUTROPHILS NFR BLD AUTO: 61.7 %
NRBC BLD AUTO-RTO: 0 %
PLATELET # BLD AUTO: 260 X10(3)/MCL (ref 130–400)
PMV BLD AUTO: 10.1 FL (ref 7.4–10.4)
POTASSIUM SERPL-SCNC: 4.2 MMOL/L (ref 3.5–5.1)
PROT SERPL-MCNC: 6.6 GM/DL (ref 6.4–8.3)
RBC # BLD AUTO: 3.87 X10(6)/MCL (ref 4.2–5.4)
SODIUM SERPL-SCNC: 143 MMOL/L (ref 136–145)
TRIGL SERPL-MCNC: 69 MG/DL (ref 37–140)
TSH SERPL-ACNC: 0.95 UIU/ML (ref 0.35–4.94)
VIT B12 SERPL-MCNC: 528 PG/ML (ref 213–816)
VLDLC SERPL CALC-MCNC: 14 MG/DL
WBC # SPEC AUTO: 6.7 X10(3)/MCL (ref 4.5–11.5)

## 2022-11-22 PROCEDURE — 82306 VITAMIN D 25 HYDROXY: CPT

## 2022-11-22 PROCEDURE — 84443 ASSAY THYROID STIM HORMONE: CPT

## 2022-11-22 PROCEDURE — 85025 COMPLETE CBC W/AUTO DIFF WBC: CPT

## 2022-11-22 PROCEDURE — 36415 COLL VENOUS BLD VENIPUNCTURE: CPT

## 2022-11-22 PROCEDURE — 82607 VITAMIN B-12: CPT

## 2022-11-22 PROCEDURE — 80053 COMPREHEN METABOLIC PANEL: CPT

## 2022-11-22 PROCEDURE — 82746 ASSAY OF FOLIC ACID SERUM: CPT

## 2022-11-22 PROCEDURE — 80061 LIPID PANEL: CPT

## 2022-11-30 ENCOUNTER — OFFICE VISIT (OUTPATIENT)
Dept: INTERNAL MEDICINE | Facility: CLINIC | Age: 37
End: 2022-11-30
Payer: MEDICAID

## 2022-11-30 VITALS
TEMPERATURE: 98 F | HEART RATE: 76 BPM | SYSTOLIC BLOOD PRESSURE: 98 MMHG | WEIGHT: 110 LBS | HEIGHT: 61 IN | DIASTOLIC BLOOD PRESSURE: 68 MMHG | RESPIRATION RATE: 20 BRPM | BODY MASS INDEX: 20.77 KG/M2

## 2022-11-30 DIAGNOSIS — D64.9 ANEMIA, UNSPECIFIED TYPE: ICD-10-CM

## 2022-11-30 DIAGNOSIS — Z00.00 WELLNESS EXAMINATION: Primary | ICD-10-CM

## 2022-11-30 DIAGNOSIS — E05.90 SUBCLINICAL HYPERTHYROIDISM: ICD-10-CM

## 2022-11-30 DIAGNOSIS — Z72.0 TOBACCO USE: ICD-10-CM

## 2022-11-30 DIAGNOSIS — Z23 NEED FOR INFLUENZA VACCINATION: ICD-10-CM

## 2022-11-30 DIAGNOSIS — E55.9 VITAMIN D DEFICIENCY: ICD-10-CM

## 2022-11-30 DIAGNOSIS — B20 HIV INFECTION, UNSPECIFIED SYMPTOM STATUS: ICD-10-CM

## 2022-11-30 DIAGNOSIS — G47.00 INSOMNIA, UNSPECIFIED TYPE: ICD-10-CM

## 2022-11-30 PROCEDURE — 99395 PR PREVENTIVE VISIT,EST,18-39: ICD-10-PCS | Mod: S$PBB,,, | Performed by: NURSE PRACTITIONER

## 2022-11-30 PROCEDURE — 99212 PR OFFICE/OUTPT VISIT, EST, LEVL II, 10-19 MIN: ICD-10-PCS | Mod: 25,S$PBB,, | Performed by: NURSE PRACTITIONER

## 2022-11-30 PROCEDURE — 1159F PR MEDICATION LIST DOCUMENTED IN MEDICAL RECORD: ICD-10-PCS | Mod: CPTII,,, | Performed by: NURSE PRACTITIONER

## 2022-11-30 PROCEDURE — 99395 PREV VISIT EST AGE 18-39: CPT | Mod: S$PBB,,, | Performed by: NURSE PRACTITIONER

## 2022-11-30 PROCEDURE — 99212 OFFICE O/P EST SF 10 MIN: CPT | Mod: 25,S$PBB,, | Performed by: NURSE PRACTITIONER

## 2022-11-30 PROCEDURE — 3074F SYST BP LT 130 MM HG: CPT | Mod: CPTII,,, | Performed by: NURSE PRACTITIONER

## 2022-11-30 PROCEDURE — 3078F DIAST BP <80 MM HG: CPT | Mod: CPTII,,, | Performed by: NURSE PRACTITIONER

## 2022-11-30 PROCEDURE — 3008F BODY MASS INDEX DOCD: CPT | Mod: CPTII,,, | Performed by: NURSE PRACTITIONER

## 2022-11-30 PROCEDURE — 99214 OFFICE O/P EST MOD 30 MIN: CPT | Mod: PBBFAC | Performed by: NURSE PRACTITIONER

## 2022-11-30 PROCEDURE — 1159F MED LIST DOCD IN RCRD: CPT | Mod: CPTII,,, | Performed by: NURSE PRACTITIONER

## 2022-11-30 PROCEDURE — 3078F PR MOST RECENT DIASTOLIC BLOOD PRESSURE < 80 MM HG: ICD-10-PCS | Mod: CPTII,,, | Performed by: NURSE PRACTITIONER

## 2022-11-30 PROCEDURE — 90686 IIV4 VACC NO PRSV 0.5 ML IM: CPT | Mod: PBBFAC

## 2022-11-30 PROCEDURE — 3008F PR BODY MASS INDEX (BMI) DOCUMENTED: ICD-10-PCS | Mod: CPTII,,, | Performed by: NURSE PRACTITIONER

## 2022-11-30 PROCEDURE — 1160F RVW MEDS BY RX/DR IN RCRD: CPT | Mod: CPTII,,, | Performed by: NURSE PRACTITIONER

## 2022-11-30 PROCEDURE — 3074F PR MOST RECENT SYSTOLIC BLOOD PRESSURE < 130 MM HG: ICD-10-PCS | Mod: CPTII,,, | Performed by: NURSE PRACTITIONER

## 2022-11-30 PROCEDURE — 1160F PR REVIEW ALL MEDS BY PRESCRIBER/CLIN PHARMACIST DOCUMENTED: ICD-10-PCS | Mod: CPTII,,, | Performed by: NURSE PRACTITIONER

## 2022-11-30 RX ORDER — ERGOCALCIFEROL 1.25 MG/1
50000 CAPSULE ORAL
Qty: 4 CAPSULE | Refills: 2 | Status: SHIPPED | OUTPATIENT
Start: 2022-11-30 | End: 2023-02-28
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
## 2022-11-30 NOTE — ASSESSMENT & PLAN NOTE
Vitamin D is low. I am sending a prescription of Vitamin D (Ergocalciferol) to the pharmacy). Pt is to take as prescribed. Once the prescription is completed, pt should obtain Vitamin D3 1,000 - 2,000 IU once daily.

## 2022-12-15 DIAGNOSIS — D64.9 ANEMIA, UNSPECIFIED TYPE: Primary | ICD-10-CM

## 2022-12-15 RX ORDER — FERROUS SULFATE 325(65) MG
325 TABLET ORAL DAILY
Qty: 30 TABLET | Refills: 3 | Status: SHIPPED | OUTPATIENT
Start: 2022-12-15 | End: 2023-05-01 | Stop reason: SDUPTHER

## 2022-12-15 NOTE — TELEPHONE ENCOUNTER
[facility] faxed over refill request for ferosul 325mg tab.     LV 08/05/2022  NV 01/31/2022

## 2022-12-20 DIAGNOSIS — B20 HIV DISEASE: ICD-10-CM

## 2022-12-20 RX ORDER — ABACAVIR SULFATE, DOLUTEGRAVIR SODIUM, LAMIVUDINE 600; 50; 300 MG/1; MG/1; MG/1
1 TABLET, FILM COATED ORAL DAILY
Qty: 30 TABLET | Refills: 1 | Status: SHIPPED | OUTPATIENT
Start: 2022-12-20 | End: 2023-03-07 | Stop reason: SDUPTHER

## 2022-12-20 NOTE — TELEPHONE ENCOUNTER
----- Message from Mickie Byrd sent at 12/20/2022  8:47 AM CST -----  Regarding: Pt call  #ASHLEY PT#    Pt came by the clinic to let Ashley know that she will be moving out of state soon. Would like a refill on her Triumeq sent to [facility] pharmacy .  Pt [phone]

## 2022-12-20 NOTE — TELEPHONE ENCOUNTER
Last Visit  8/05/22  Next Visit  1/13/23 - Canceled  Last Labs   8/5/22    Patient stopped by clinic to inform us that she is moving out of state.

## 2022-12-21 NOTE — TELEPHONE ENCOUNTER
Ashley Quiroz, APRN  You Yesterday (10:52 AM)     EB  Refill authorized.  Attempted to phone pt to get more information regarding her move, date & location.  Would she like to try and have an appointment before the move to update labs & refills? We can try to accommodate if needed.  Thanks.

## 2022-12-21 NOTE — TELEPHONE ENCOUNTER
Called patient, gentleman that answered the phone (would not give his name) stated she was at home. I explained the numbers listed only gave this number. He stated he would have her contact our office.

## 2023-01-09 ENCOUNTER — DOCUMENTATION ONLY (OUTPATIENT)
Dept: ADMINISTRATIVE | Facility: HOSPITAL | Age: 38
End: 2023-01-09
Payer: MEDICAID

## 2023-03-06 PROBLEM — Z00.00 WELLNESS EXAMINATION: Status: RESOLVED | Noted: 2022-05-05 | Resolved: 2023-03-06

## 2023-03-07 ENCOUNTER — OFFICE VISIT (OUTPATIENT)
Dept: INFECTIOUS DISEASES | Facility: CLINIC | Age: 38
End: 2023-03-07
Payer: MEDICAID

## 2023-03-07 VITALS
SYSTOLIC BLOOD PRESSURE: 116 MMHG | HEART RATE: 62 BPM | RESPIRATION RATE: 16 BRPM | WEIGHT: 106 LBS | TEMPERATURE: 98 F | HEIGHT: 61 IN | BODY MASS INDEX: 20.01 KG/M2 | DIASTOLIC BLOOD PRESSURE: 84 MMHG

## 2023-03-07 DIAGNOSIS — B00.1 HERPES LABIALIS: ICD-10-CM

## 2023-03-07 DIAGNOSIS — E55.9 VITAMIN D DEFICIENCY: ICD-10-CM

## 2023-03-07 DIAGNOSIS — B20 HIV DISEASE: Primary | ICD-10-CM

## 2023-03-07 LAB
ALBUMIN SERPL-MCNC: 4.6 G/DL (ref 3.5–5)
ALBUMIN/GLOB SERPL: 1.5 RATIO (ref 1.1–2)
ALP SERPL-CCNC: 56 UNIT/L (ref 40–150)
ALT SERPL-CCNC: 11 UNIT/L (ref 0–55)
AST SERPL-CCNC: 14 UNIT/L (ref 5–34)
BASOPHILS # BLD AUTO: 0.01 X10(3)/MCL (ref 0–0.2)
BASOPHILS NFR BLD AUTO: 0.2 %
BILIRUBIN DIRECT+TOT PNL SERPL-MCNC: 0.2 MG/DL
BUN SERPL-MCNC: 14.7 MG/DL (ref 7–18.7)
CALCIUM SERPL-MCNC: 9.8 MG/DL (ref 8.4–10.2)
CHLORIDE SERPL-SCNC: 107 MMOL/L (ref 98–107)
CO2 SERPL-SCNC: 25 MMOL/L (ref 22–29)
CREAT SERPL-MCNC: 1.23 MG/DL (ref 0.55–1.02)
EOSINOPHIL # BLD AUTO: 0.11 X10(3)/MCL (ref 0–0.9)
EOSINOPHIL NFR BLD AUTO: 2 %
ERYTHROCYTE [DISTWIDTH] IN BLOOD BY AUTOMATED COUNT: 12.2 % (ref 11.5–17)
GFR SERPLBLD CREATININE-BSD FMLA CKD-EPI: 59 MLS/MIN/1.73/M2
GLOBULIN SER-MCNC: 3.1 GM/DL (ref 2.4–3.5)
GLUCOSE SERPL-MCNC: 58 MG/DL (ref 74–100)
HCT VFR BLD AUTO: 46.6 % (ref 37–47)
HGB BLD-MCNC: 15.4 G/DL (ref 12–16)
IMM GRANULOCYTES # BLD AUTO: 0.01 X10(3)/MCL (ref 0–0.04)
IMM GRANULOCYTES NFR BLD AUTO: 0.2 %
LYMPHOCYTES # BLD AUTO: 1.57 X10(3)/MCL (ref 0.6–4.6)
LYMPHOCYTES NFR BLD AUTO: 28.8 %
MCH RBC QN AUTO: 34.6 PG
MCHC RBC AUTO-ENTMCNC: 33 G/DL (ref 33–36)
MCV RBC AUTO: 104.7 FL (ref 80–94)
MONOCYTES # BLD AUTO: 0.29 X10(3)/MCL (ref 0.1–1.3)
MONOCYTES NFR BLD AUTO: 5.3 %
NEUTROPHILS # BLD AUTO: 3.46 X10(3)/MCL (ref 2.1–9.2)
NEUTROPHILS NFR BLD AUTO: 63.5 %
NRBC BLD AUTO-RTO: 0 %
PLATELET # BLD AUTO: 224 X10(3)/MCL (ref 130–400)
PMV BLD AUTO: 10.6 FL (ref 7.4–10.4)
POTASSIUM SERPL-SCNC: 4.1 MMOL/L (ref 3.5–5.1)
PROT SERPL-MCNC: 7.7 GM/DL (ref 6.4–8.3)
RBC # BLD AUTO: 4.45 X10(6)/MCL (ref 4.2–5.4)
SODIUM SERPL-SCNC: 141 MMOL/L (ref 136–145)
T PALLIDUM AB SER QL: REACTIVE
WBC # SPEC AUTO: 5.5 X10(3)/MCL (ref 4.5–11.5)

## 2023-03-07 PROCEDURE — 1159F PR MEDICATION LIST DOCUMENTED IN MEDICAL RECORD: ICD-10-PCS | Mod: CPTII,,, | Performed by: NURSE PRACTITIONER

## 2023-03-07 PROCEDURE — 99215 OFFICE O/P EST HI 40 MIN: CPT | Mod: S$PBB,,, | Performed by: NURSE PRACTITIONER

## 2023-03-07 PROCEDURE — 3008F PR BODY MASS INDEX (BMI) DOCUMENTED: ICD-10-PCS | Mod: CPTII,,, | Performed by: NURSE PRACTITIONER

## 2023-03-07 PROCEDURE — 3079F DIAST BP 80-89 MM HG: CPT | Mod: CPTII,,, | Performed by: NURSE PRACTITIONER

## 2023-03-07 PROCEDURE — 36415 COLL VENOUS BLD VENIPUNCTURE: CPT | Performed by: NURSE PRACTITIONER

## 2023-03-07 PROCEDURE — 99215 PR OFFICE/OUTPT VISIT, EST, LEVL V, 40-54 MIN: ICD-10-PCS | Mod: S$PBB,,, | Performed by: NURSE PRACTITIONER

## 2023-03-07 PROCEDURE — 3074F PR MOST RECENT SYSTOLIC BLOOD PRESSURE < 130 MM HG: ICD-10-PCS | Mod: CPTII,,, | Performed by: NURSE PRACTITIONER

## 2023-03-07 PROCEDURE — 86361 T CELL ABSOLUTE COUNT: CPT | Performed by: NURSE PRACTITIONER

## 2023-03-07 PROCEDURE — 3008F BODY MASS INDEX DOCD: CPT | Mod: CPTII,,, | Performed by: NURSE PRACTITIONER

## 2023-03-07 PROCEDURE — 80053 COMPREHEN METABOLIC PANEL: CPT | Performed by: NURSE PRACTITIONER

## 2023-03-07 PROCEDURE — 1160F PR REVIEW ALL MEDS BY PRESCRIBER/CLIN PHARMACIST DOCUMENTED: ICD-10-PCS | Mod: CPTII,,, | Performed by: NURSE PRACTITIONER

## 2023-03-07 PROCEDURE — 1159F MED LIST DOCD IN RCRD: CPT | Mod: CPTII,,, | Performed by: NURSE PRACTITIONER

## 2023-03-07 PROCEDURE — 86592 SYPHILIS TEST NON-TREP QUAL: CPT | Performed by: NURSE PRACTITIONER

## 2023-03-07 PROCEDURE — 1160F RVW MEDS BY RX/DR IN RCRD: CPT | Mod: CPTII,,, | Performed by: NURSE PRACTITIONER

## 2023-03-07 PROCEDURE — 85025 COMPLETE CBC W/AUTO DIFF WBC: CPT | Performed by: NURSE PRACTITIONER

## 2023-03-07 PROCEDURE — 87536 HIV-1 QUANT&REVRSE TRNSCRPJ: CPT | Mod: 90 | Performed by: NURSE PRACTITIONER

## 2023-03-07 PROCEDURE — 3079F PR MOST RECENT DIASTOLIC BLOOD PRESSURE 80-89 MM HG: ICD-10-PCS | Mod: CPTII,,, | Performed by: NURSE PRACTITIONER

## 2023-03-07 PROCEDURE — 86780 TREPONEMA PALLIDUM: CPT | Performed by: NURSE PRACTITIONER

## 2023-03-07 PROCEDURE — 3074F SYST BP LT 130 MM HG: CPT | Mod: CPTII,,, | Performed by: NURSE PRACTITIONER

## 2023-03-07 PROCEDURE — 99213 OFFICE O/P EST LOW 20 MIN: CPT | Mod: PBBFAC | Performed by: NURSE PRACTITIONER

## 2023-03-07 PROCEDURE — 86780 TREPONEMA PALLIDUM: CPT | Mod: 90 | Performed by: NURSE PRACTITIONER

## 2023-03-07 PROCEDURE — 86480 TB TEST CELL IMMUN MEASURE: CPT | Mod: 90 | Performed by: NURSE PRACTITIONER

## 2023-03-07 RX ORDER — ERGOCALCIFEROL 1.25 MG/1
50000 CAPSULE ORAL DAILY
Qty: 120 CAPSULE | Refills: 2 | Status: SHIPPED | OUTPATIENT
Start: 2023-03-07 | End: 2024-03-05 | Stop reason: SDUPTHER

## 2023-03-07 RX ORDER — VALACYCLOVIR HYDROCHLORIDE 1 G/1
1000 TABLET, FILM COATED ORAL DAILY
Qty: 20 TABLET | Refills: 1 | Status: SHIPPED | OUTPATIENT
Start: 2023-03-07 | End: 2023-11-06

## 2023-03-07 RX ORDER — ABACAVIR SULFATE, DOLUTEGRAVIR SODIUM, LAMIVUDINE 600; 50; 300 MG/1; MG/1; MG/1
1 TABLET, FILM COATED ORAL DAILY
Qty: 30 TABLET | Refills: 1 | Status: SHIPPED | OUTPATIENT
Start: 2023-03-07 | End: 2023-05-15 | Stop reason: SDUPTHER

## 2023-03-07 NOTE — PROGRESS NOTES
Subjective:       Patient ID: Gayatri Márquez is a 37 y.o. female.    Chief Complaint: Followup HIV (Cut to right wrist, wants you to evaluate)    3/7/23  Gayatri is a 36 yo WF here today for HIV f/u visit.  She takes Triumeq daily & is virally suppressed. Labs 8/22 VL UD, CD4 814. She is taking iron as prescribed by PCP.  She tells me that she has been having recurrent outbreaks of cold sores on her lips.  She has a known history of HSV, but usually only has an outbreak every few years. She has been under increased stress with her mother in law being very ill and in/out of hospital & ICU.  Will treat with Valtrex for same. She has quit smoking x 2 weeks now, efforts applauded. She is scheduled for GYN appt 4/13/23, plans to attend. All questions answered & concerns addressed.    8/5/22  Gayatri is a 35 yo WF presenting today for HIV f/u visit.  She is taking Triumeq daily as prescribed & has been virally suppressed.  Last labs 2/21/22 VL UD, CD4 622.  She is taking OTC vitamin d3 daily.  She acknowledges that she has missed f/u she forearm really need the nose whenever it is in ultrasound see what with GYN, will go to GYN clinic today to reschedule visit.  Colposcopy completed 2/21.  She is in same relationship, no new partners.  She tells me that she has been having painful sores developing in her nares.  She picks at them, and they are not completely healing.  Can become painful, draining, and swelling.  Will treat with topical mupirocin.  Voiced appreciation. She is cutting back on cigarettes, down to 1 pack per week.  Will consider complete cessation as recommended. All questions answered & concerns addressed.      2/21/22  Gayatri is a 35 yo WF presenting today for HIV f/u visit.  She reports 100% adherent to Triumeq, tolerates well with viral suppression.  Last labs 10/21 VL <20, Cd4 537.  Will update labs today.  Remains in care with PCP VIKASH Curtis NP & is scheduled for f/u with GYN on 2/23/22.  She has cut  back to 1/4 ppd of cigarettes, not ready to quit yet.  She declines COVID vaccination, vaccinations up to date otherwise.  She is not currently taking vitamin d, will resume otc vitamin d3 daily dosing.  She has no concerns today.     Review of Systems   Constitutional: Negative.    HENT: Negative.     Eyes: Negative.    Respiratory: Negative.     Cardiovascular: Negative.    Gastrointestinal: Negative.    Genitourinary: Negative.    Integumentary:  Negative.   Neurological: Negative.    Psychiatric/Behavioral: Negative.         Objective:      Physical Exam  Vitals reviewed.   Constitutional:       General: She is not in acute distress.     Appearance: Normal appearance. She is not toxic-appearing.   HENT:      Mouth/Throat:      Mouth: Mucous membranes are moist.      Pharynx: Oropharynx is clear.   Eyes:      Conjunctiva/sclera: Conjunctivae normal.   Cardiovascular:      Rate and Rhythm: Normal rate and regular rhythm.   Pulmonary:      Effort: Pulmonary effort is normal. No respiratory distress.      Breath sounds: Normal breath sounds.   Abdominal:      General: Abdomen is flat. Bowel sounds are normal.      Palpations: Abdomen is soft.   Musculoskeletal:         General: Normal range of motion.      Cervical back: Normal range of motion.   Lymphadenopathy:      Cervical: No cervical adenopathy.   Skin:     General: Skin is warm and dry.   Neurological:      General: No focal deficit present.      Mental Status: She is alert and oriented to person, place, and time. Mental status is at baseline.   Psychiatric:         Mood and Affect: Mood normal.         Behavior: Behavior normal.       Assessment:       Problem List Items Addressed This Visit    None  Visit Diagnoses       HIV disease    -  Primary              Plan:           HIV disease  -     CBC Auto Differential; Future; Expected date: 03/07/2023  -     CD4 Lymphocytes; Future; Expected date: 03/07/2023  -     Comprehensive Metabolic Panel  -      SYPHILIS ANTIBODY (WITH REFLEX RPR); Future; Expected date: 03/07/2023  -     HIV-1 RNA, Quantitative, PCR with Reflex to Genotype; Future; Expected date: 03/07/2023  -     Quantiferon Gold TB; Future; Expected date: 03/07/2023  -     abacavir-dolutegravir-lamivud (TRIUMEQ) 600- mg Tab; Take 1 tablet by mouth once daily.  Dispense: 30 tablet; Refill: 1  -     RPR  Diagnosed 6/2019.  VL [facility] 8260, CD4 317.  Adherence and sexual health counseling done.  Use condoms for all sexual encounters.  Blood precautions.  Continue Triumeq as prescribed.  Labs today as ordered.  RTC 4 months with Ashley.      Wellness:  Cervical pap: colpo 2/21, GYN 4/23  Anal pap: 10/20 Neg  Cervical CT/GC: 10/21 Neg  Anal CT/GC: 10/20 Neg  Oral CT/GC: 10/20 Neg  Eye exam: 3/21     Herpes labialis  -     valACYclovir (VALTREX) 1000 MG tablet; Take 1 tablet (1,000 mg total) by mouth once daily. for 5 days  Dispense: 20 tablet; Refill: 1  Valtrex 1 gm daily x 5 days & PRN outbreaks.   Notify me if continues to recur & suppressive therapy is needed.    Vitamin D deficiency  -     ergocalciferol (VITAMIN D2) 50,000 unit Cap; Take 1 capsule (50,000 Units total) by mouth once daily.  Dispense: 120 capsule; Refill: 2  Continue vitamin D3 daily OTC.        I spent a total of 44 minutes on the day of the visit.  This includes face to face time and non-face to face time preparing to see the patient (eg, review of tests), obtaining and/or reviewing separately obtained history, documenting clinical information in the electronic or other health record, independently interpreting results and communicating results to the patient/family/caregiver, or care coordinator.

## 2023-03-08 LAB
RPR SER QL: NORMAL
RPR SER-TITR: NORMAL {TITER}

## 2023-03-09 LAB
AGE: 35
CD3+CD4+ CELLS # BLD: 29 % (ref 28–48)
CD3+CD4+ CELLS # SPEC: 658.74 UNIT/L (ref 589–1505)
CD3+CD4+ CELLS NFR BLD: 41.3 %
GAMMA INTERFERON BACKGROUND BLD IA-ACNC: 0.01 IU/ML
HIV1 RNA # PLAS NAA DL=20: NORMAL COPIES/ML
LYMPHOCYTES # BLD AUTO: 1595 X10(3)/MCL (ref 1260–5520)
LYMPHOMA - T-CELL MARKERS SPEC-IMP: NORMAL
M TB IFN-G BLD-IMP: NEGATIVE
M TB IFN-G CD4+ BCKGRND COR BLD-ACNC: 0 IU/ML
M TB IFN-G CD4+CD8+ BCKGRND COR BLD-ACNC: 0 IU/ML
MITOGEN IGNF BCKGRD COR BLD-ACNC: 2.85 IU/ML
WBC # BLD AUTO: 5500 /MM3 (ref 4500–11500)

## 2023-03-10 LAB — T PALLIDUM AB SER QL: REACTIVE

## 2023-04-13 ENCOUNTER — OFFICE VISIT (OUTPATIENT)
Dept: GYNECOLOGY | Facility: CLINIC | Age: 38
End: 2023-04-13
Payer: MEDICAID

## 2023-04-13 ENCOUNTER — TELEPHONE (OUTPATIENT)
Dept: GYNECOLOGY | Facility: CLINIC | Age: 38
End: 2023-04-13

## 2023-04-13 VITALS
SYSTOLIC BLOOD PRESSURE: 118 MMHG | RESPIRATION RATE: 16 BRPM | DIASTOLIC BLOOD PRESSURE: 85 MMHG | WEIGHT: 111.19 LBS | HEART RATE: 96 BPM | BODY MASS INDEX: 20.46 KG/M2 | OXYGEN SATURATION: 98 % | HEIGHT: 62 IN | TEMPERATURE: 98 F

## 2023-04-13 DIAGNOSIS — Z01.419 WOMEN'S ANNUAL ROUTINE GYNECOLOGICAL EXAMINATION: Primary | ICD-10-CM

## 2023-04-13 DIAGNOSIS — R10.2 PELVIC PAIN: ICD-10-CM

## 2023-04-13 LAB
B-HCG UR QL: NEGATIVE
BILIRUB SERPL-MCNC: NORMAL MG/DL
BLOOD URINE, POC: NORMAL
CLUE CELLS VAG QL WET PREP: ABNORMAL
COLOR, POC UA: YELLOW
CTP QC/QA: YES
GLUCOSE UR QL STRIP: NORMAL
KETONES UR QL STRIP: NORMAL
LEUKOCYTE ESTERASE URINE, POC: NORMAL
NITRITE, POC UA: NORMAL
PH, POC UA: NORMAL
PROTEIN, POC: NORMAL
SPECIFIC GRAVITY, POC UA: NORMAL
T VAGINALIS VAG QL WET PREP: ABNORMAL
UROBILINOGEN, POC UA: NORMAL
WBC #/AREA VAG WET PREP: ABNORMAL
YEAST SPEC QL WET PREP: ABNORMAL

## 2023-04-13 PROCEDURE — 3074F PR MOST RECENT SYSTOLIC BLOOD PRESSURE < 130 MM HG: ICD-10-PCS | Mod: CPTII,,, | Performed by: NURSE PRACTITIONER

## 2023-04-13 PROCEDURE — 88174 CYTOPATH C/V AUTO IN FLUID: CPT | Performed by: NURSE PRACTITIONER

## 2023-04-13 PROCEDURE — 87624 HPV HI-RISK TYP POOLED RSLT: CPT

## 2023-04-13 PROCEDURE — 1159F PR MEDICATION LIST DOCUMENTED IN MEDICAL RECORD: ICD-10-PCS | Mod: CPTII,,, | Performed by: NURSE PRACTITIONER

## 2023-04-13 PROCEDURE — 3008F PR BODY MASS INDEX (BMI) DOCUMENTED: ICD-10-PCS | Mod: CPTII,,, | Performed by: NURSE PRACTITIONER

## 2023-04-13 PROCEDURE — 1160F RVW MEDS BY RX/DR IN RCRD: CPT | Mod: CPTII,,, | Performed by: NURSE PRACTITIONER

## 2023-04-13 PROCEDURE — 3079F PR MOST RECENT DIASTOLIC BLOOD PRESSURE 80-89 MM HG: ICD-10-PCS | Mod: CPTII,,, | Performed by: NURSE PRACTITIONER

## 2023-04-13 PROCEDURE — 87591 N.GONORRHOEAE DNA AMP PROB: CPT

## 2023-04-13 PROCEDURE — 1159F MED LIST DOCD IN RCRD: CPT | Mod: CPTII,,, | Performed by: NURSE PRACTITIONER

## 2023-04-13 PROCEDURE — 99395 PREV VISIT EST AGE 18-39: CPT | Mod: S$PBB,,, | Performed by: NURSE PRACTITIONER

## 2023-04-13 PROCEDURE — 3074F SYST BP LT 130 MM HG: CPT | Mod: CPTII,,, | Performed by: NURSE PRACTITIONER

## 2023-04-13 PROCEDURE — 81001 URINALYSIS AUTO W/SCOPE: CPT | Mod: PBBFAC | Performed by: NURSE PRACTITIONER

## 2023-04-13 PROCEDURE — 3008F BODY MASS INDEX DOCD: CPT | Mod: CPTII,,, | Performed by: NURSE PRACTITIONER

## 2023-04-13 PROCEDURE — 1160F PR REVIEW ALL MEDS BY PRESCRIBER/CLIN PHARMACIST DOCUMENTED: ICD-10-PCS | Mod: CPTII,,, | Performed by: NURSE PRACTITIONER

## 2023-04-13 PROCEDURE — 87491 CHLMYD TRACH DNA AMP PROBE: CPT

## 2023-04-13 PROCEDURE — 3079F DIAST BP 80-89 MM HG: CPT | Mod: CPTII,,, | Performed by: NURSE PRACTITIONER

## 2023-04-13 PROCEDURE — 87210 SMEAR WET MOUNT SALINE/INK: CPT | Performed by: NURSE PRACTITIONER

## 2023-04-13 PROCEDURE — 99214 OFFICE O/P EST MOD 30 MIN: CPT | Mod: PBBFAC | Performed by: NURSE PRACTITIONER

## 2023-04-13 PROCEDURE — 81025 URINE PREGNANCY TEST: CPT | Mod: PBBFAC | Performed by: NURSE PRACTITIONER

## 2023-04-13 PROCEDURE — 99395 PR PREVENTIVE VISIT,EST,18-39: ICD-10-PCS | Mod: S$PBB,,, | Performed by: NURSE PRACTITIONER

## 2023-04-13 RX ORDER — METRONIDAZOLE 500 MG/1
500 TABLET ORAL EVERY 12 HOURS
Qty: 14 TABLET | Refills: 0 | Status: SHIPPED | OUTPATIENT
Start: 2023-04-13 | End: 2023-04-20

## 2023-04-13 NOTE — PROGRESS NOTES
"Patient ID: Gayatri Márquez is a 37 y.o. female.    Chief Complaint: Well Woman          HPI:  The patient is  (SABx2) here for annual gyn exam. Last pap -[facility];HPV+. Colposcopy 2021-no cervical biopsies taken; ECC benign. EMB was also apparently performed at that time. Patho-- Proliferative endometrium with alterations in gland architecture consistent with anovulation, with stromal breakdown; No evidence of malignancy. Pt had mirena IUD placed 2021 for dual benefit of contraception and AUB. She is pleased with this method. States now has light spotting every few months. Today, pt c/o RLQ pelvic pain x one week. Describes as sharp. Denies n/v/d. Reports brown discharge. Pt has hx of HIV and is followed in ID clinic. She is sexually active with one partner. States does not use condoms and partner has been frequently screened.     Review of Systems:   Negative except for findings in HPI     Objective:   /85   Pulse 96   Temp 98 °F (36.7 °C)   Resp 16   Ht 5' 2" (1.575 m)   Wt 50.4 kg (111 lb 3.2 oz)   SpO2 98%   BMI 20.34 kg/m²    Physical Exam:  GENERAL: Pt is aware and alert and  in no acute distress.  BREASTS: Bilateral-No masses, nipple discharge, skin changes, or tenderness.  ABDOMEN: Soft, non tender.  VULVA:  No lesions or skin changes.  URETHRA: No lesions  BLADDER: No tenderness.  VAGINA: Mucosa normal,small amount of blood; no lesions.  CERVIX:  no CMT, NO discharge; NO lesions. IUD strings visible  BIMANUAL EXAM: reveals a 8-week-sized uterus. The uterus is mobile, nontender, no palpable masses.+tenderness to right adnexa    SKIN: Warm and Dry  PSYCHIATRIC: Patient is awake and alert. Mood and affect are normal.    Assessment:   Women's annual routine gynecological examination  -     Liquid-Based Pap Smear, Screening Screening    Pelvic pain  -     POCT urine pregnancy  -     POCT urinalysis, dipstick or tablet reag  -     Wet Prep, Genital  -     US Pelvis Comp with Transvag NON-OB " (xpd; Future; Expected date: 04/20/2023            1. Women's annual routine gynecological examination    2. Pelvic pain         -pap/hpv today; check pelvic US. Will call with results  -upt negative; poc ua with no leukocytes/nitrites; +blood but pt with vaginal bleeding today  -Encouraged pt to speak with ID provider about possible PrEP for partner.    Plan:       Follow up in about 1 year (around 4/13/2024).

## 2023-04-14 ENCOUNTER — TELEPHONE (OUTPATIENT)
Dept: GYNECOLOGY | Facility: CLINIC | Age: 38
End: 2023-04-14
Payer: MEDICAID

## 2023-04-14 NOTE — TELEPHONE ENCOUNTER
Called pt to inform her of results and instructed her on information given. RX was sent to pharmacy on file. Pt verbalized understanding and had no further questions or concerns at this time.

## 2023-04-19 ENCOUNTER — TELEPHONE (OUTPATIENT)
Dept: GYNECOLOGY | Facility: CLINIC | Age: 38
End: 2023-04-19
Payer: MEDICAID

## 2023-04-19 LAB — PSYCHE PATHOLOGY RESULT: NORMAL

## 2023-04-19 NOTE — TELEPHONE ENCOUNTER
Please inform pt that her pap smear is again abnormal (HGSIL: HPV+). She needs a colposcopy. Please schedule her in colposcopy clinic (within 30 days) and notify pt of date and time of appt.    good balance

## 2023-04-20 ENCOUNTER — TELEPHONE (OUTPATIENT)
Dept: GYNECOLOGY | Facility: CLINIC | Age: 38
End: 2023-04-20
Payer: MEDICAID

## 2023-04-20 NOTE — TELEPHONE ENCOUNTER
Wendie,    Can we get this patient  an earlier colposcopy appt? She has a hx of abnormal paps and her currently pap smear- HGSIL: HPV+. Thank you!

## 2023-04-27 ENCOUNTER — HOSPITAL ENCOUNTER (OUTPATIENT)
Dept: RADIOLOGY | Facility: HOSPITAL | Age: 38
Discharge: HOME | End: 2023-04-27
Attending: NURSE PRACTITIONER
Payer: MEDICAID

## 2023-04-27 DIAGNOSIS — R10.2 PELVIC PAIN: ICD-10-CM

## 2023-04-27 PROCEDURE — 76856 US EXAM PELVIC COMPLETE: CPT | Mod: TC

## 2023-05-01 DIAGNOSIS — D64.9 ANEMIA, UNSPECIFIED TYPE: Primary | ICD-10-CM

## 2023-05-01 RX ORDER — FERROUS SULFATE 325(65) MG
325 TABLET ORAL DAILY
Qty: 30 TABLET | Refills: 3 | Status: SHIPPED | OUTPATIENT
Start: 2023-05-01 | End: 2023-10-02 | Stop reason: SDUPTHER

## 2023-05-01 NOTE — TELEPHONE ENCOUNTER
Last visit with Ashley Quiroz, APRN: 3/7/2023  Last visit in [facility] INFECTIOUS DISEASE: 3/7/2023    Patient's next visit in [facility] INFECTIOUS DISEASE: 7/7/2023     Last labs 03/07/2023. Please advise on med refill

## 2023-05-02 ENCOUNTER — TELEPHONE (OUTPATIENT)
Dept: GYNECOLOGY | Facility: CLINIC | Age: 38
End: 2023-05-02
Payer: MEDICAID

## 2023-05-02 NOTE — TELEPHONE ENCOUNTER
Please inform her that her pelvic US was normal. We can talk more about it on Thursday at her visit.

## 2023-05-02 NOTE — TELEPHONE ENCOUNTER
----- Message from MONICO Gonzalez sent at 5/2/2023  9:44 AM CDT -----  Schedule for problem visit. Looks like I have an opening on Thursday.  ----- Message -----  From: Suzanne Price MA  Sent: 5/2/2023   8:41 AM CDT  To: MONICO Gonzalez      ----- Message -----  From: Priti Barber MA  Sent: 5/2/2023   8:29 AM CDT  To: Roberto Peterson V Staff    Pt called clinic. C/o nausea, itching and burning when urinating. Pt states she took antibiotics for BV and says it hasn't helped. Pt states wants to come to clinic to get tested. Please advise. Thanks.

## 2023-05-15 DIAGNOSIS — B20 HIV DISEASE: Primary | ICD-10-CM

## 2023-05-15 RX ORDER — ABACAVIR SULFATE, DOLUTEGRAVIR SODIUM, LAMIVUDINE 600; 50; 300 MG/1; MG/1; MG/1
1 TABLET, FILM COATED ORAL DAILY
Qty: 30 TABLET | Refills: 2 | Status: SHIPPED | OUTPATIENT
Start: 2023-05-15 | End: 2023-08-03 | Stop reason: SDUPTHER

## 2023-05-15 NOTE — TELEPHONE ENCOUNTER
Last visit with Ashley Quiroz, APRN: 3/7/2023  Last visit in [facility] INFECTIOUS DISEASE: 3/7/2023    Patient's next visit in [facility] INFECTIOUS DISEASE: 7/7/2023     Please advise on med refill.

## 2023-05-25 ENCOUNTER — OFFICE VISIT (OUTPATIENT)
Dept: GYNECOLOGY | Facility: CLINIC | Age: 38
End: 2023-05-25
Payer: MEDICAID

## 2023-05-25 VITALS
OXYGEN SATURATION: 98 % | SYSTOLIC BLOOD PRESSURE: 103 MMHG | BODY MASS INDEX: 20.24 KG/M2 | WEIGHT: 110 LBS | TEMPERATURE: 98 F | HEART RATE: 72 BPM | HEIGHT: 62 IN | RESPIRATION RATE: 16 BRPM | DIASTOLIC BLOOD PRESSURE: 76 MMHG

## 2023-05-25 DIAGNOSIS — N89.8 VAGINAL DISCHARGE: Primary | ICD-10-CM

## 2023-05-25 DIAGNOSIS — N64.4 BREAST PAIN: ICD-10-CM

## 2023-05-25 LAB
C TRACH DNA SPEC QL NAA+PROBE: NOT DETECTED
CLUE CELLS VAG QL WET PREP: ABNORMAL
N GONORRHOEA DNA SPEC QL NAA+PROBE: NOT DETECTED
T VAGINALIS VAG QL WET PREP: ABNORMAL
WBC #/AREA VAG WET PREP: ABNORMAL
YEAST SPEC QL WET PREP: ABNORMAL

## 2023-05-25 PROCEDURE — 3008F BODY MASS INDEX DOCD: CPT | Mod: CPTII,,, | Performed by: NURSE PRACTITIONER

## 2023-05-25 PROCEDURE — 3078F DIAST BP <80 MM HG: CPT | Mod: CPTII,,, | Performed by: NURSE PRACTITIONER

## 2023-05-25 PROCEDURE — 1159F PR MEDICATION LIST DOCUMENTED IN MEDICAL RECORD: ICD-10-PCS | Mod: CPTII,,, | Performed by: NURSE PRACTITIONER

## 2023-05-25 PROCEDURE — 87591 N.GONORRHOEAE DNA AMP PROB: CPT | Performed by: NURSE PRACTITIONER

## 2023-05-25 PROCEDURE — 99213 OFFICE O/P EST LOW 20 MIN: CPT | Mod: S$PBB,,, | Performed by: NURSE PRACTITIONER

## 2023-05-25 PROCEDURE — 1159F MED LIST DOCD IN RCRD: CPT | Mod: CPTII,,, | Performed by: NURSE PRACTITIONER

## 2023-05-25 PROCEDURE — 87210 SMEAR WET MOUNT SALINE/INK: CPT | Performed by: NURSE PRACTITIONER

## 2023-05-25 PROCEDURE — 3074F SYST BP LT 130 MM HG: CPT | Mod: CPTII,,, | Performed by: NURSE PRACTITIONER

## 2023-05-25 PROCEDURE — 1160F RVW MEDS BY RX/DR IN RCRD: CPT | Mod: CPTII,,, | Performed by: NURSE PRACTITIONER

## 2023-05-25 PROCEDURE — 1160F PR REVIEW ALL MEDS BY PRESCRIBER/CLIN PHARMACIST DOCUMENTED: ICD-10-PCS | Mod: CPTII,,, | Performed by: NURSE PRACTITIONER

## 2023-05-25 PROCEDURE — 99213 PR OFFICE/OUTPT VISIT, EST, LEVL III, 20-29 MIN: ICD-10-PCS | Mod: S$PBB,,, | Performed by: NURSE PRACTITIONER

## 2023-05-25 PROCEDURE — 3078F PR MOST RECENT DIASTOLIC BLOOD PRESSURE < 80 MM HG: ICD-10-PCS | Mod: CPTII,,, | Performed by: NURSE PRACTITIONER

## 2023-05-25 PROCEDURE — 3074F PR MOST RECENT SYSTOLIC BLOOD PRESSURE < 130 MM HG: ICD-10-PCS | Mod: CPTII,,, | Performed by: NURSE PRACTITIONER

## 2023-05-25 PROCEDURE — 3008F PR BODY MASS INDEX (BMI) DOCUMENTED: ICD-10-PCS | Mod: CPTII,,, | Performed by: NURSE PRACTITIONER

## 2023-05-25 PROCEDURE — 99214 OFFICE O/P EST MOD 30 MIN: CPT | Mod: PBBFAC | Performed by: NURSE PRACTITIONER

## 2023-05-25 NOTE — PROGRESS NOTES
"Patient ID: Gayatri Márquez is a 37 y.o. female.    Chief Complaint: Vaginal Itching (/)      Review of patient's allergies indicates:   Allergen Reactions    Sulfamethoxazole-trimethoprim Hives           HPI:  The patient is  (SABx2) scheduled appt today for "vaginal itch". States this has since resolved. She was treated for BV at her last visit in March and is requesting another swab. Denies vaginal discharge/odor. Pt has mirena IUD. She is spotting today. She is also reporting bilateral breast tenderness that she says has been present for months. Right>left.  No relation to menses. Pt has no immediate fly hx (first degree) of breast cancer. Reports very distant relatives with breast cancer. Pt had abnormal pap smear at last visit and has already been scheduled for colposcopy appt.  Pt has hx of HIV and is followed in ID clinic.   Review of Systems:   Negative except for findings in HPI     Objective:   /76   Pulse 72   Temp 98 °F (36.7 °C)   Resp 16   Ht 5' 2" (1.575 m)   Wt 49.9 kg (110 lb)   SpO2 98%   BMI 20.12 kg/m²    Physical Exam:  GENERAL: Pt is aware and alert and  in no acute distress.  BREASTS: Bilateral-glandular consistency; tenderness to right breast 9 oclock; 7cm from nipple; no nipple discharge or definite mass  ABDOMEN: Soft, non tender.  VULVA:  No lesions or skin changes.  URETHRA: No lesions  BLADDER: No tenderness.  VAGINA: Mucosa normal,small amount of dark brown discharge; no lesions.  CERVIX:  no CMT, NO discharge; NO lesions .IUD strings visible  BIMANUAL EXAM: The uterus is mobile, nontender, no palpable masses. Deniz adnexa reveal no evidence of masses; no fullness   SKIN: Warm and Dry  PSYCHIATRIC: Patient is awake and alert. Mood and affect are normal.    Assessment:   Vaginal discharge  -     Wet Prep, Genital  -     Chlamydia/GC, PCR    Breast pain  -     Mammo Digital Diagnostic Bilat with Rajat; Future; Expected date: 2023  -      Breast Right Limited; Future; " Expected date: 06/08/2023            1. Vaginal discharge    2. Breast pain             - avoid douching or scented products to vaginal area; stressed importance of keeping colposcopy appt-verbalized understanding and states will attend  Plan:       Follow up for keep colpo appt with gyn resident team.

## 2023-05-29 ENCOUNTER — TELEPHONE (OUTPATIENT)
Dept: INTERNAL MEDICINE | Facility: CLINIC | Age: 38
End: 2023-05-29
Payer: MEDICAID

## 2023-05-30 ENCOUNTER — TELEPHONE (OUTPATIENT)
Dept: GYNECOLOGY | Facility: CLINIC | Age: 38
End: 2023-05-30
Payer: MEDICAID

## 2023-05-30 RX ORDER — METRONIDAZOLE 500 MG/1
500 TABLET ORAL EVERY 12 HOURS
Qty: 14 TABLET | Refills: 0 | Status: SHIPPED | OUTPATIENT
Start: 2023-05-30 | End: 2023-06-06

## 2023-05-30 NOTE — TELEPHONE ENCOUNTER
"Called pt to inform her of her results pt significant other answered the phone and stated " I'm at work and she is at home I will tell her to call ya'll." Informed him to have her call us at [phone] about her results.   "

## 2023-06-22 ENCOUNTER — HOSPITAL ENCOUNTER (OUTPATIENT)
Dept: RADIOLOGY | Facility: HOSPITAL | Age: 38
Discharge: HOME | End: 2023-06-22
Attending: NURSE PRACTITIONER
Payer: MEDICAID

## 2023-06-22 DIAGNOSIS — N64.4 BREAST PAIN: ICD-10-CM

## 2023-06-22 PROCEDURE — 77066 DX MAMMO INCL CAD BI: CPT | Mod: 26,,, | Performed by: RADIOLOGY

## 2023-06-22 PROCEDURE — 76642 US BREAST BILATERAL LIMITED: ICD-10-PCS | Mod: 26,50,, | Performed by: RADIOLOGY

## 2023-06-22 PROCEDURE — 77062 MAMMO DIGITAL DIAGNOSTIC BILAT WITH TOMO: ICD-10-PCS | Mod: 26,,, | Performed by: RADIOLOGY

## 2023-06-22 PROCEDURE — 77062 BREAST TOMOSYNTHESIS BI: CPT | Mod: 26,,, | Performed by: RADIOLOGY

## 2023-06-22 PROCEDURE — 76642 ULTRASOUND BREAST LIMITED: CPT | Mod: TC,50

## 2023-06-22 PROCEDURE — 77066 MAMMO DIGITAL DIAGNOSTIC BILAT WITH TOMO: ICD-10-PCS | Mod: 26,,, | Performed by: RADIOLOGY

## 2023-06-22 PROCEDURE — 77066 DX MAMMO INCL CAD BI: CPT | Mod: TC

## 2023-06-22 PROCEDURE — 76642 ULTRASOUND BREAST LIMITED: CPT | Mod: 26,50,, | Performed by: RADIOLOGY

## 2023-07-28 ENCOUNTER — PROCEDURE VISIT (OUTPATIENT)
Dept: GYNECOLOGY | Facility: CLINIC | Age: 38
End: 2023-07-28
Payer: MEDICAID

## 2023-07-28 VITALS
HEIGHT: 62 IN | OXYGEN SATURATION: 99 % | RESPIRATION RATE: 18 BRPM | BODY MASS INDEX: 21.2 KG/M2 | TEMPERATURE: 98 F | DIASTOLIC BLOOD PRESSURE: 78 MMHG | WEIGHT: 115.19 LBS | HEART RATE: 62 BPM | SYSTOLIC BLOOD PRESSURE: 111 MMHG

## 2023-07-28 DIAGNOSIS — R87.613 HIGH GRADE SQUAMOUS INTRAEPITHELIAL LESION ON CYTOLOGIC SMEAR OF CERVIX (HGSIL): Primary | ICD-10-CM

## 2023-07-28 LAB
B-HCG UR QL: NEGATIVE
CTP QC/QA: YES

## 2023-07-28 PROCEDURE — 57454 BX/CURETT OF CERVIX W/SCOPE: CPT | Mod: PBBFAC

## 2023-07-28 PROCEDURE — 81025 URINE PREGNANCY TEST: CPT | Mod: PBBFAC

## 2023-07-28 PROCEDURE — 88305 TISSUE EXAM BY PATHOLOGIST: CPT | Mod: TC

## 2023-07-28 RX ORDER — CIPROFLOXACIN 500 MG/1
500 TABLET ORAL 2 TIMES DAILY
COMMUNITY
Start: 2023-07-25 | End: 2024-03-06

## 2023-07-28 RX ORDER — BUPROPION HYDROCHLORIDE 150 MG/1
150 TABLET ORAL
COMMUNITY
Start: 2023-07-20

## 2023-07-28 NOTE — PROCEDURES
Colposcopy    Date/Time: 2023 9:00 AM  Performed by: Radha Croft DO  Authorized by: Radha Croft, DO     Consent Done?:  Yes (Written)  Timeout:Immediately prior to procedure a time out was called to verify the correct patient, procedure, equipment, support staff and site/side marked as required  Assistants?: Yes    List of assistants:  Cathy    Colposcopy Site:  Cervix  Position:  Supine  Acrowhite Lesion? Yes (at 6:00, 3:00, & 12:00)    Atypical Vessels? Yes (at 6:00)    Transformation Zone Adequate?: Yes    Biopsy?: Yes         Location:  Cervix ((6 00, 3 00 and 12 00))  ECC Performed?: Yes    LEEP Performed?: No    Estimated blood loss (cc):  1   Patient tolerated the procedure well with no immediate complications.   Post-operative instructions were provided for the patient.   Patient was discharged and will follow up if any complications occur                          [facility] COLPOSCOPY CLINIC NOTE     Gayatri Márquez is a 37 y.o.  referred to [facility] colposcopy clinic from FELICE Peterson.      Patient reports Yes, current history of abnormal pap smear.        Pap History:   10/28/2020: ASCUS  2023: Findings; lab pap smear results: high-grade squamous intraepithelial neoplasia  (HGSIL-encompassing moderate and severe dysplasia), other High Risk HPV positive    Sexual History:    Number of sex partners: Current 1; Lifetime >20  Contraception:  PLAN CONTRACEPTION: IUD  Future fertility desires: YES/NO: No  Smoking History: History smoking: the patient is a former smoker who quit smoking on 3/2023  HIV status: positive  HPV vaccination status: YES/NO: Yes    GYN History:  Last menstrual period:  (2023)        Gynecology  OB History          4    Para   2    Term   2            AB   2    Living   2         SAB   2    IAB        Ectopic        Multiple        Live Births   2                Past Medical History:   Diagnosis Date    Herpes simplex virus (HSV) infection     Human  "immunodeficiency virus (HIV) disease       Past Surgical History:   Procedure Laterality Date    NEPHRECTOMY  2014    right    VASCULAR SURGERY          Social History     Tobacco Use    Smoking status: Former     Types: Vaping with nicotine, Cigarettes     Quit date: 3/3/2023     Years since quittin.4    Smokeless tobacco: Current    Tobacco comments:     Stopped smoking cigarettes 2023. Vaping for now   Substance Use Topics    Alcohol use: Not Currently    Drug use: Yes     Frequency: 7.0 times per week     Types: Marijuana       Review of Systems  General: denies fever/chills  CV: denies chest pain  Pulm: denies SOB  Abd: denies abdominal pain  : denies vag bleeding, discharge, dysuria, dyspareunia, vaginal odor    PSH: colpo 2019: show moderate dysplasia on biopsy, ECC showed high grade dysplasia. Patient was loss to follow up.  Med: wellburtin 150mg, triumeq [id number] daily, Valtrex 1000mg prn for flares    Objective:     /78   Pulse 62   Temp 97.6 °F (36.4 °C) (Oral)   Resp 18   Ht 5' 2" (1.575 m)   Wt 52.3 kg (115 lb 3.2 oz)   SpO2 99%   BMI 21.07 kg/m²   Physical Exam:  Gen: Well-nourished, well-developed female appearing stated age. Alert, cooperative, in no acute distress.      SEE COLPOSCOPY PROCEDURE NOTE      Assessment:       37 y.o.  here for:  1. 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  Arrange additional treatment or follow up at that time.  Discussed with pt on recommendation of excision based on past history and current results.    Radha Croft DO  [employer]  Resident, PGY-1

## 2023-08-01 LAB
ESTROGEN SERPL-MCNC: NORMAL PG/ML
INSULIN SERPL-ACNC: NORMAL U[IU]/ML
LAB AP CLINICAL INFORMATION: NORMAL
LAB AP GROSS DESCRIPTION: NORMAL
LAB AP REPORT FOOTNOTES: NORMAL
T3RU NFR SERPL: NORMAL %

## 2023-08-03 ENCOUNTER — TELEPHONE (OUTPATIENT)
Dept: GYNECOLOGY | Facility: CLINIC | Age: 38
End: 2023-08-03
Payer: MEDICAID

## 2023-08-03 ENCOUNTER — OFFICE VISIT (OUTPATIENT)
Dept: INFECTIOUS DISEASES | Facility: CLINIC | Age: 38
End: 2023-08-03
Payer: MEDICAID

## 2023-08-03 VITALS
BODY MASS INDEX: 20.96 KG/M2 | SYSTOLIC BLOOD PRESSURE: 116 MMHG | DIASTOLIC BLOOD PRESSURE: 85 MMHG | TEMPERATURE: 98 F | HEIGHT: 62 IN | HEART RATE: 72 BPM | RESPIRATION RATE: 16 BRPM | WEIGHT: 113.88 LBS

## 2023-08-03 DIAGNOSIS — B20 HIV DISEASE: ICD-10-CM

## 2023-08-03 PROCEDURE — 3079F DIAST BP 80-89 MM HG: CPT | Mod: CPTII,,, | Performed by: NURSE PRACTITIONER

## 2023-08-03 PROCEDURE — 3074F SYST BP LT 130 MM HG: CPT | Mod: CPTII,,, | Performed by: NURSE PRACTITIONER

## 2023-08-03 PROCEDURE — 99214 OFFICE O/P EST MOD 30 MIN: CPT | Mod: PBBFAC | Performed by: NURSE PRACTITIONER

## 2023-08-03 PROCEDURE — 3008F PR BODY MASS INDEX (BMI) DOCUMENTED: ICD-10-PCS | Mod: CPTII,,, | Performed by: NURSE PRACTITIONER

## 2023-08-03 PROCEDURE — 3008F BODY MASS INDEX DOCD: CPT | Mod: CPTII,,, | Performed by: NURSE PRACTITIONER

## 2023-08-03 PROCEDURE — 1159F MED LIST DOCD IN RCRD: CPT | Mod: CPTII,,, | Performed by: NURSE PRACTITIONER

## 2023-08-03 PROCEDURE — 99214 PR OFFICE/OUTPT VISIT, EST, LEVL IV, 30-39 MIN: ICD-10-PCS | Mod: S$PBB,,, | Performed by: NURSE PRACTITIONER

## 2023-08-03 PROCEDURE — 1159F PR MEDICATION LIST DOCUMENTED IN MEDICAL RECORD: ICD-10-PCS | Mod: CPTII,,, | Performed by: NURSE PRACTITIONER

## 2023-08-03 PROCEDURE — 3074F PR MOST RECENT SYSTOLIC BLOOD PRESSURE < 130 MM HG: ICD-10-PCS | Mod: CPTII,,, | Performed by: NURSE PRACTITIONER

## 2023-08-03 PROCEDURE — 1160F PR REVIEW ALL MEDS BY PRESCRIBER/CLIN PHARMACIST DOCUMENTED: ICD-10-PCS | Mod: CPTII,,, | Performed by: NURSE PRACTITIONER

## 2023-08-03 PROCEDURE — 99214 OFFICE O/P EST MOD 30 MIN: CPT | Mod: S$PBB,,, | Performed by: NURSE PRACTITIONER

## 2023-08-03 PROCEDURE — 3079F PR MOST RECENT DIASTOLIC BLOOD PRESSURE 80-89 MM HG: ICD-10-PCS | Mod: CPTII,,, | Performed by: NURSE PRACTITIONER

## 2023-08-03 PROCEDURE — 1160F RVW MEDS BY RX/DR IN RCRD: CPT | Mod: CPTII,,, | Performed by: NURSE PRACTITIONER

## 2023-08-03 RX ORDER — ABACAVIR SULFATE, DOLUTEGRAVIR SODIUM, LAMIVUDINE 600; 50; 300 MG/1; MG/1; MG/1
1 TABLET, FILM COATED ORAL DAILY
Qty: 90 TABLET | Refills: 1 | Status: SHIPPED | OUTPATIENT
Start: 2023-08-03 | End: 2023-11-06 | Stop reason: SDUPTHER
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
## 2023-08-03 NOTE — TELEPHONE ENCOUNTER
Called patient, she had an appt with ID today, CBC looks great.  She did have the discussion with her NP about starting wellbutrin last week which may have been the cause of dizziness, etc..  I told her that we were going to call her with her colpo results soon (gyn provider).

## 2023-08-04 PROCEDURE — 86360 T CELL ABSOLUTE COUNT/RATIO: CPT | Performed by: NURSE PRACTITIONER

## 2023-08-07 ENCOUNTER — TELEPHONE (OUTPATIENT)
Dept: GYNECOLOGY | Facility: CLINIC | Age: 38
End: 2023-08-07
Payer: MEDICAID

## 2023-08-07 NOTE — TELEPHONE ENCOUNTER
08/07 : Called patient to discuss results of recent colposcopy office visit. No answer: discuss lab results and will try again later . Called each of the number listed in the chart ( as well as emergency contact). None of the numbers were in service and there was no voicemail set up.       08/11: Called the patient to discuss the results of recent coloposcopy. Spoke with the patient directly about the pathology of the colposcopy and discuss treatment plan      Based on patient pap smear results and recent colposcopy findings/results:    Pap:     10/28/2020: ASCUS  4/13/2023: HSIL , other HrHPV+    Colposcopy results:  1. Cervix, 6:00, biopsy:  - High-grade squamous intraepithelial lesion (HSIL).       2. Cervix, 3:00, biopsy:  - High-grade squamous intraepithelial lesion (HSIL).        3. Cervix, 12:00, biopsy:  - High-grade squamous intraepithelial lesion (HSIL).        4. Endocervix, ECC:  - Scant strips of endocervical epithelium with predominantly mucus.        Assessment/Diagnosis:  Cervical dysplaisa- HSIL    Plan:   [facility]  Spoke with the pt on 08/11 and she understands the results and would like to move forward with [facility].   Will send message to the surgery schedulers       Fe Quispe MD   [facility], PGY2

## 2023-08-12 ENCOUNTER — TELEPHONE (OUTPATIENT)
Dept: GYNECOLOGY | Facility: CLINIC | Age: 38
End: 2023-08-12
Payer: MEDICAID

## 2023-08-12 DIAGNOSIS — N87.9 CERVICAL DYSPLASIA: Primary | ICD-10-CM

## 2023-08-12 NOTE — TELEPHONE ENCOUNTER
Called Ms Gayatri Márquez to discuss surgical planning.  Patient agreeable to date of 10/5 for surgery.  Preop appt requested, case request placed.

## 2023-08-15 ENCOUNTER — TELEPHONE (OUTPATIENT)
Dept: GYNECOLOGY | Facility: CLINIC | Age: 38
End: 2023-08-15
Payer: MEDICAID

## 2023-08-16 LAB
INR PPP: 1
PROTHROMBIN TIME: 13.2 SECONDS (ref 11.4–14)

## 2023-09-24 NOTE — PROGRESS NOTES
[facility] Preoperative Visit History and Physical    HPI:   Gayatri Márquez 37 y.o.  with a history of cervical dysplasia. History as follows:     Pap:     10/28/2020: ASCUS  2023: HSIL , other HrHPV+     Colposcopy results:  1. Cervix, 6:00, biopsy:  - High-grade squamous intraepithelial lesion (HSIL).       2. Cervix, 3:00, biopsy:  - High-grade squamous intraepithelial lesion (HSIL).        3. Cervix, 12:00, biopsy:  - High-grade squamous intraepithelial lesion (HSIL).        4. Endocervix, ECC:  - Scant strips of endocervical epithelium with predominantly mucus.             PMH:  Past Medical History:   Diagnosis Date    H/O syphilis     Herpes simplex virus (HSV) infection     Valtrex PRN    Human immunodeficiency virus (HIV) disease     On triumeq, VL undetectable 2023   History of a right nephrectomy 2/2 pyelonephritis with postop LLE DVT (), left renal vein thrombus    ObHx:  OB History    Para Term  AB Living   4 2 2   2 2   SAB IAB Ectopic Multiple Live Births   2       2      # Outcome Date GA Lbr Clifford/2nd Weight Sex Delivery Anes PTL Lv   4 Term     F Vag-Spont   DARSHAN   3 Term     M Vag-Spont   DARSHAN   2 SAB         FD   1 SAB         FD   BB 7lbs    GynHx:  Triad: g/4-7  LMP: 2023  Contraception: IUD     SurgHx:  Past Surgical History:   Procedure Laterality Date    COLPOSCOPY, WITH BIOPSY OF CERVIX  2023    NEPHRECTOMY  2014    right    VASCULAR SURGERY         FamHx:  Family History   Problem Relation Age of Onset    Arthritis Mother     Depression Mother     Hyperlipidemia Mother     Migraines Mother     Heart disease Father     Depression Maternal Grandmother     Diabetes Maternal Grandmother     Hypertension Maternal Grandmother     Coronary artery disease Paternal Grandmother     Diabetes Paternal Grandmother     Cardiomyopathy Paternal Grandfather     COPD Paternal Grandfather     Kidney disease Paternal Grandfather        SocialHx:  Social  History     Socioeconomic History    Marital status: Significant Other    Number of children: 2   Tobacco Use    Smoking status: Former     Current packs/day: 0.00     Types: Vaping with nicotine, Cigarettes     Quit date: 3/3/2023     Years since quittin.5    Smokeless tobacco: Current    Tobacco comments:     Stopped smoking cigarettes x 2 weeks. Vaping for now   Substance and Sexual Activity    Alcohol use: Not Currently    Drug use: Yes     Frequency: 7.0 times per week     Types: Marijuana    Sexual activity: Yes     Partners: Male     Birth control/protection: None     Social Determinants of Health     Financial Resource Strain: Low Risk  (2022)    Overall Financial Resource Strain (CARDIA)     Difficulty of Paying Living Expenses: Not hard at all   Food Insecurity: No Food Insecurity (2022)    Hunger Vital Sign     Worried About Running Out of Food in the Last Year: Never true     Ran Out of Food in the Last Year: Never true   Transportation Needs: No Transportation Needs (2022)    PRAPARE - Transportation     Lack of Transportation (Medical): No     Lack of Transportation (Non-Medical): No   Physical Activity: Inactive (2022)    Exercise Vital Sign     Days of Exercise per Week: 0 days     Minutes of Exercise per Session: 0 min   Stress: Stress Concern Present (2022)    [place] [employer] - Occupational Stress Questionnaire     Feeling of Stress : To some extent   Social Connections: Moderately Isolated (2022)    Social Connection and Isolation Panel [NHANES]     Frequency of Communication with Friends and Family: More than three times a week     Frequency of Social Gatherings with Friends and Family: Never     Attends Anglican Services: Never     Active Member of Clubs or Organizations: No     Attends Club or Organization Meetings: Never     Marital Status: Living with partner   Housing Stability: Low Risk  (2022)    Housing Stability  Vital Sign     Unable to Pay for Housing in the Last Year: No     Number of Places Lived in the Last Year: 1     Unstable Housing in the Last Year: No       All:    Review of patient's allergies indicates:   Allergen Reactions    Sulfamethoxazole-trimethoprim Hives       Meds:  Prior to Admission medications    Medication Sig Start Date End Date Taking? Authorizing Provider   abacavir-dolutegravir-lamivud (TRIUMEQ) 600- mg Tab Take 1 tablet by mouth once daily. 8/3/23   Ashley Quiroz APRN   ascorbic acid, vitamin C, (VITAMIN C) 100 MG tablet Take 500 mg by mouth once daily.    Provider, Historical   buPROPion (WELLBUTRIN XL) 150 MG TB24 tablet Take 150 mg by mouth. 23   Provider, Historical   chlorhexidine (PERIDEX) 0.12 % solution Take 15 mLs by mouth daily as needed for Other. Oral pain 21   Provider, Historical   ergocalciferol (VITAMIN D2) 50,000 unit Cap Take 1 capsule (50,000 Units total) by mouth once daily. 3/7/23 3/6/24  Ashley Quiroz APRN   ferrous sulfate (FEROSUL) 325 mg (65 mg iron) Tab tablet Take 1 tablet (325 mg total) by mouth once daily. 23   Ashley Quiroz APRN   multivitamin Tab Take 1 tablet by mouth once daily. 22  Jarred Curtis, MONICO   valACYclovir (VALTREX) 1000 MG tablet Take 1 tablet (1,000 mg total) by mouth once daily. for 5 days 3/7/23 8/3/23  Ashley Quiroz APRN         Review of Systems: As stated in HPI.      Objective:     /85  HR 72  BMI 21    PHYSICAL EXAM  GEN: NAD, A&O x3  CV: RRR   LUNGS: CTABL  ABDOMEN: soft, NTND, no masses    Colposcopic impression        '  Labs:   2023 CD4 356, VL undetectable  Hep C neg  TB neg  4.75>14.9/44.2<222    Assessment:      37 y.o.  presents for surgical management of cervical dysplasia     1. Cervical dysplasia               Plan:     Counseling: This procedure and its risks, benefits, alternatives (such as LEEP) and complications (including injury to bowel, bladder,  major blood vessel, ureter, bleeding, possibility of transfusion, infection, scarring, dyspareunia, erosion, further surgery, incontinence, failure of the procedure, or fistula formation) were reviewed in detail. We reviewed risks and indication for cold knife conization and discussed the risk that not all of the abnormal cells are removed, and the possible indication for repeat excision or hysterectomy if repeat excision is not feasible. Patient was counseled regarding the rates of cervical dysplasia progression, persistence and regression. In the case of CIN3, 30% rate of regression without intervention and 10% progression to cancer without intervention. Patient was informed that pathology would be reviewed at her 2 week postop visit. Patient notes she is likely done with childbearing, however we did review that in the event she becomes pregnant there is an increased risk of cervical insufficiency,  labor, cervical stenosis, and low-birthweight  s/p CKC. We also reviewed patient's risk factors for cervical cancer. Discussed the Gardasil vaccine series is now approved for men and women up to age 45. Counseled that while it would not protect her from HPV strains she is already infected with, it can protect her from other low and high risk strains. We further discussed the recommendation for two dose HPV vaccination in both girls and boys as early as age 9 and she plans to have her children vaccinated.   We have reviewed the typical perioperative course with hospital stay, and post-operative precautions and restrictions have been reviewed.    Patient counseled on the fact that cystotomy complicates approximately 0.3 to 1000 benign gynecologic surgeries.   Transfusion of blood and blood products discussed. Patient was consented for blood transfusion in the case of an emergency.  She understands the possibility of blood transfusion reaction and the attendant risk of transmission of HIV & Hepatitis C to  be 1 in 2 million and the risk of Hepatitis B to be 1 in 200,000.  She is aware of the possibility of transfusion reaction. All questions were answered.   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  She has consented to an exam under anesthesia and understands that medical students participate in this portion of the procedure.  All questions were answered.    Preop testing ordered.  Surgery case requested. Surgical consents signed.  Instructions reviewed, including NPO after midnight.    PAT appointment requested   Current contraception: Mirena IUD    To OR for CKC with Dr. Lyle   Scheduled on 10/5/2023    Discussed with Dr. Provost Julia Steve MD MPH   [facility], PGY-3  *

## 2023-09-27 ENCOUNTER — OFFICE VISIT (OUTPATIENT)
Dept: GYNECOLOGY | Facility: CLINIC | Age: 38
End: 2023-09-27
Payer: MEDICAID

## 2023-09-27 DIAGNOSIS — N87.9 CERVICAL DYSPLASIA: Primary | ICD-10-CM

## 2023-09-29 ENCOUNTER — ANESTHESIA EVENT (OUTPATIENT)
Dept: SURGERY | Facility: HOSPITAL | Age: 38
End: 2023-09-29
Payer: MEDICAID

## 2023-09-29 NOTE — ANESTHESIA PREPROCEDURE EVALUATION
09/29/2023  Gayatri Márquez is a 37 y.o., female with PMHx of smoking/vaping, HIV presents for CKC.    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    Non-functioning kidney 05/09/2014    Subclinical hyperthyroidism 05/04/2022    Tachycardia 05/11/2014    Urinary calculus 02/06/2014    Tobacco use 05/04/2022    Anemia 05/05/2022    Edema 05/05/2022    Insomnia 05/05/2022    Vitamin D deficiency 11/30/2022     Resolved Ambulatory Problems     Diagnosis Date Noted    Anemia due to acute blood loss 05/11/2014    Wellness examination 05/05/2022     Past Medical History:   Diagnosis Date    H/O syphilis     Herpes simplex virus (HSV) infection     Human immunodeficiency virus (HIV) disease            Pre-op Assessment    I have reviewed the NPO Status.      Review of Systems  Anesthesia Hx:  No problems with previous Anesthesia    Social:  Vaping    Cardiovascular:  Cardiovascular Normal     Pulmonary:  Pulmonary Normal    Renal/:  Renal/ Normal     Hepatic/GI:  Hepatic/GI Normal    Neurological:  Neurology Normal    Endocrine:  Endocrine Normal      Vitals:    10/05/23 1142 10/05/23 1146 10/05/23 1147 10/05/23 1216   BP:   112/77 112/77   Pulse:   74    Resp:  20     Temp:   36.6 °C (97.8 °F)    TempSrc:   Oral    SpO2:   99%    Weight: 51.2 kg (112 lb 12.8 oz)            Physical Exam  General: Alert, Cooperative and Well nourished    Airway:  Mallampati: II   Mouth Opening: Normal  TM Distance: Normal  Tongue: Normal  Neck ROM: Normal ROM    Dental:  Intact    Chest/Lungs:  Clear to auscultation, Normal Respiratory Rate    Heart:  Rate: Normal  Rhythm: Regular Rhythm  Sounds: Normal       Latest Reference Range & Units 10/05/23 11:56   Preg Test,  Ur Negative  Negative     Lab Results   Component Value Date    WBC 4.75 08/03/2023    HGB 14.9 08/03/2023    HCT 44.2 08/03/2023    .5 (H) 08/03/2023     08/03/2023       CMP  Sodium Level   Date Value Ref Range Status   08/03/2023 141 136 - 145 mmol/L Final     Potassium Level   Date Value Ref Range Status   08/03/2023 4.7 3.5 - 5.1 mmol/L Final     Carbon Dioxide   Date Value Ref Range Status   08/03/2023 27 22 - 29 mmol/L Final     Blood Urea Nitrogen   Date Value Ref Range Status   08/03/2023 10.8 7.0 - 18.7 mg/dL Final     Creatinine   Date Value Ref Range Status   08/03/2023 1.00 0.55 - 1.02 mg/dL Final     Calcium Level Total   Date Value Ref Range Status   08/03/2023 9.4 8.4 - 10.2 mg/dL Final     Albumin Level   Date Value Ref Range Status   08/03/2023 4.4 3.5 - 5.0 g/dL Final     Bilirubin Total   Date Value Ref Range Status   08/03/2023 0.3 <=1.5 mg/dL Final     Alkaline Phosphatase   Date Value Ref Range Status   08/03/2023 56 40 - 150 unit/L Final     Aspartate Aminotransferase   Date Value Ref Range Status   08/03/2023 14 5 - 34 unit/L Final     Alanine Aminotransferase   Date Value Ref Range Status   08/03/2023 14 0 - 55 unit/L Final     eGFR   Date Value Ref Range Status   08/03/2023 >60 mls/min/1.73/m2 Final         Anesthesia Plan  Type of Anesthesia, risks & benefits discussed:    Anesthesia Type: Gen Supraglottic Airway  Intra-op Monitoring Plan: Standard ASA Monitors  Post Op Pain Control Plan: IV/PO Opioids PRN  Induction:  IV  Airway Plan: Direct  Informed Consent: Informed consent signed with the Patient and all parties understand the risks and agree with anesthesia plan.  All questions answered.   ASA Score: 3  Day of Surgery Review of History & Physical: H&P Update referred to the surgeon/provider.    Ready For Surgery From Anesthesia Perspective.     .

## 2023-10-02 ENCOUNTER — TELEPHONE (OUTPATIENT)
Dept: INFECTIOUS DISEASES | Facility: CLINIC | Age: 38
End: 2023-10-02
Payer: MEDICAID

## 2023-10-02 DIAGNOSIS — D64.9 ANEMIA, UNSPECIFIED TYPE: ICD-10-CM

## 2023-10-02 RX ORDER — FERROUS SULFATE 325(65) MG
325 TABLET ORAL DAILY
Qty: 30 TABLET | Refills: 3 | Status: SHIPPED | OUTPATIENT
Start: 2023-10-02 | End: 2024-03-05 | Stop reason: SDUPTHER

## 2023-10-02 NOTE — TELEPHONE ENCOUNTER
Last visit with Ashley Quiroz, APRN: 8/3/2023  Last visit in [facility] INFECTIOUS DISEASE: 8/3/2023    Patient's next visit in [facility] INFECTIOUS DISEASE: 11/6/2023     LL 08/03/2023    Please advise on med refill

## 2023-10-03 ENCOUNTER — PATIENT MESSAGE (OUTPATIENT)
Dept: PREADMISSION TESTING | Facility: HOSPITAL | Age: 38
End: 2023-10-03
Payer: MEDICAID

## 2023-10-04 ENCOUNTER — PATIENT MESSAGE (OUTPATIENT)
Dept: SURGERY | Facility: HOSPITAL | Age: 38
End: 2023-10-04
Payer: MEDICAID

## 2023-10-05 ENCOUNTER — HOSPITAL ENCOUNTER (OUTPATIENT)
Facility: HOSPITAL | Age: 38
Discharge: HOME | End: 2023-10-05
Attending: OBSTETRICS & GYNECOLOGY | Admitting: OBSTETRICS & GYNECOLOGY
Payer: MEDICAID

## 2023-10-05 ENCOUNTER — ANESTHESIA (OUTPATIENT)
Dept: SURGERY | Facility: HOSPITAL | Age: 38
End: 2023-10-05
Payer: MEDICAID

## 2023-10-05 DIAGNOSIS — N87.9 CERVICAL DYSPLASIA: Primary | ICD-10-CM

## 2023-10-05 LAB
B-HCG UR QL: NEGATIVE
CTP QC/QA: YES

## 2023-10-05 PROCEDURE — 81025 URINE PREGNANCY TEST: CPT | Performed by: ANESTHESIOLOGY

## 2023-10-05 PROCEDURE — 27201423 OPTIME MED/SURG SUP & DEVICES STERILE SUPPLY: Performed by: OBSTETRICS & GYNECOLOGY

## 2023-10-05 PROCEDURE — 88307 TISSUE EXAM BY PATHOLOGIST: CPT | Mod: TC | Performed by: OBSTETRICS & GYNECOLOGY

## 2023-10-05 PROCEDURE — 88305 TISSUE EXAM BY PATHOLOGIST: CPT | Mod: TC | Performed by: OBSTETRICS & GYNECOLOGY

## 2023-10-05 PROCEDURE — 63600175 PHARM REV CODE 636 W HCPCS: Performed by: ANESTHESIOLOGY

## 2023-10-05 PROCEDURE — 25000003 PHARM REV CODE 250: Performed by: ANESTHESIOLOGY

## 2023-10-05 PROCEDURE — 36000706: Performed by: OBSTETRICS & GYNECOLOGY

## 2023-10-05 PROCEDURE — D9220A PRA ANESTHESIA: Mod: ANES,,, | Performed by: ANESTHESIOLOGY

## 2023-10-05 PROCEDURE — 25000003 PHARM REV CODE 250: Performed by: OBSTETRICS & GYNECOLOGY

## 2023-10-05 PROCEDURE — 25000003 PHARM REV CODE 250: Performed by: STUDENT IN AN ORGANIZED HEALTH CARE EDUCATION/TRAINING PROGRAM

## 2023-10-05 PROCEDURE — 63600175 PHARM REV CODE 636 W HCPCS: Performed by: OBSTETRICS & GYNECOLOGY

## 2023-10-05 PROCEDURE — D9220A PRA ANESTHESIA: ICD-10-PCS | Mod: CRNA,,, | Performed by: NURSE ANESTHETIST, CERTIFIED REGISTERED

## 2023-10-05 PROCEDURE — D9220A PRA ANESTHESIA: ICD-10-PCS | Mod: ANES,,, | Performed by: ANESTHESIOLOGY

## 2023-10-05 PROCEDURE — 25000003 PHARM REV CODE 250: Performed by: NURSE ANESTHETIST, CERTIFIED REGISTERED

## 2023-10-05 PROCEDURE — 71000033 HC RECOVERY, INTIAL HOUR: Performed by: OBSTETRICS & GYNECOLOGY

## 2023-10-05 PROCEDURE — 25000242 PHARM REV CODE 250 ALT 637 W/ HCPCS

## 2023-10-05 PROCEDURE — 36000707: Performed by: OBSTETRICS & GYNECOLOGY

## 2023-10-05 PROCEDURE — D9220A PRA ANESTHESIA: Mod: CRNA,,, | Performed by: NURSE ANESTHETIST, CERTIFIED REGISTERED

## 2023-10-05 PROCEDURE — 37000009 HC ANESTHESIA EA ADD 15 MINS: Performed by: OBSTETRICS & GYNECOLOGY

## 2023-10-05 PROCEDURE — 63600175 PHARM REV CODE 636 W HCPCS: Performed by: NURSE ANESTHETIST, CERTIFIED REGISTERED

## 2023-10-05 PROCEDURE — 71000015 HC POSTOP RECOV 1ST HR: Performed by: OBSTETRICS & GYNECOLOGY

## 2023-10-05 PROCEDURE — 37000008 HC ANESTHESIA 1ST 15 MINUTES: Performed by: OBSTETRICS & GYNECOLOGY

## 2023-10-05 RX ORDER — ONDANSETRON 2 MG/ML
4 INJECTION INTRAMUSCULAR; INTRAVENOUS DAILY PRN
Status: DISCONTINUED | OUTPATIENT
Start: 2023-10-05 | End: 2023-10-05 | Stop reason: HOSPADM

## 2023-10-05 RX ORDER — GABAPENTIN 300 MG/1
600 CAPSULE ORAL
Status: COMPLETED | OUTPATIENT
Start: 2023-10-05 | End: 2023-10-05

## 2023-10-05 RX ORDER — OXYCODONE HYDROCHLORIDE 5 MG/1
5 TABLET ORAL EVERY 4 HOURS PRN
Qty: 5 TABLET | Refills: 0 | Status: SHIPPED | OUTPATIENT
Start: 2023-10-05

## 2023-10-05 RX ORDER — ACETAMINOPHEN 325 MG/1
600 TABLET ORAL EVERY 6 HOURS
Qty: 112 TABLET | Refills: 0 | Status: SHIPPED | OUTPATIENT
Start: 2023-10-05 | End: 2023-10-19

## 2023-10-05 RX ORDER — SODIUM CHLORIDE 9 MG/ML
INJECTION, SOLUTION INTRAVENOUS CONTINUOUS
Status: DISCONTINUED | OUTPATIENT
Start: 2023-10-05 | End: 2023-10-05 | Stop reason: HOSPADM

## 2023-10-05 RX ORDER — LIDOCAINE HYDROCHLORIDE 20 MG/ML
INJECTION, SOLUTION EPIDURAL; INFILTRATION; INTRACAUDAL; PERINEURAL
Status: DISCONTINUED | OUTPATIENT
Start: 2023-10-05 | End: 2023-10-05

## 2023-10-05 RX ORDER — MUPIROCIN 20 MG/G
OINTMENT TOPICAL
Status: DISCONTINUED | OUTPATIENT
Start: 2023-10-05 | End: 2023-10-05 | Stop reason: HOSPADM

## 2023-10-05 RX ORDER — KETOROLAC TROMETHAMINE 30 MG/ML
INJECTION, SOLUTION INTRAMUSCULAR; INTRAVENOUS
Status: DISCONTINUED | OUTPATIENT
Start: 2023-10-05 | End: 2023-10-05

## 2023-10-05 RX ORDER — MORPHINE SULFATE 2 MG/ML
2 INJECTION, SOLUTION INTRAMUSCULAR; INTRAVENOUS EVERY 5 MIN PRN
Status: DISCONTINUED | OUTPATIENT
Start: 2023-10-05 | End: 2023-10-05 | Stop reason: HOSPADM

## 2023-10-05 RX ORDER — FAMOTIDINE 20 MG/1
20 TABLET, FILM COATED ORAL
Status: COMPLETED | OUTPATIENT
Start: 2023-10-05 | End: 2023-10-05

## 2023-10-05 RX ORDER — SODIUM CHLORIDE 0.9 % (FLUSH) 0.9 %
10 SYRINGE (ML) INJECTION
Status: DISCONTINUED | OUTPATIENT
Start: 2023-10-05 | End: 2023-10-05 | Stop reason: HOSPADM

## 2023-10-05 RX ORDER — OXYCODONE HYDROCHLORIDE 5 MG/1
5 TABLET ORAL
Status: DISCONTINUED | OUTPATIENT
Start: 2023-10-05 | End: 2023-10-05 | Stop reason: HOSPADM

## 2023-10-05 RX ORDER — VASOPRESSIN 20 [USP'U]/ML
INJECTION, SOLUTION INTRAMUSCULAR; SUBCUTANEOUS
Status: DISCONTINUED | OUTPATIENT
Start: 2023-10-05 | End: 2023-10-05 | Stop reason: HOSPADM

## 2023-10-05 RX ORDER — MEPERIDINE HYDROCHLORIDE 25 MG/ML
12.5 INJECTION INTRAMUSCULAR; INTRAVENOUS; SUBCUTANEOUS EVERY 10 MIN PRN
Status: DISCONTINUED | OUTPATIENT
Start: 2023-10-05 | End: 2023-10-05 | Stop reason: HOSPADM

## 2023-10-05 RX ORDER — DEXAMETHASONE SODIUM PHOSPHATE 4 MG/ML
INJECTION, SOLUTION INTRA-ARTICULAR; INTRALESIONAL; INTRAMUSCULAR; INTRAVENOUS; SOFT TISSUE
Status: DISCONTINUED | OUTPATIENT
Start: 2023-10-05 | End: 2023-10-05

## 2023-10-05 RX ORDER — SODIUM CHLORIDE, SODIUM LACTATE, POTASSIUM CHLORIDE, CALCIUM CHLORIDE 600; 310; 30; 20 MG/100ML; MG/100ML; MG/100ML; MG/100ML
INJECTION, SOLUTION INTRAVENOUS CONTINUOUS
Status: ACTIVE | OUTPATIENT
Start: 2023-10-05

## 2023-10-05 RX ORDER — ONDANSETRON HYDROCHLORIDE 2 MG/ML
INJECTION, SOLUTION INTRAMUSCULAR; INTRAVENOUS
Status: DISCONTINUED | OUTPATIENT
Start: 2023-10-05 | End: 2023-10-05

## 2023-10-05 RX ORDER — PROPOFOL 10 MG/ML
VIAL (ML) INTRAVENOUS
Status: DISCONTINUED | OUTPATIENT
Start: 2023-10-05 | End: 2023-10-05

## 2023-10-05 RX ORDER — SENNOSIDES 8.6 MG/1
1 TABLET ORAL DAILY
Qty: 14 TABLET | Refills: 0 | Status: SHIPPED | OUTPATIENT
Start: 2023-10-05 | End: 2023-10-19

## 2023-10-05 RX ORDER — IBUPROFEN 600 MG/1
600 TABLET ORAL EVERY 6 HOURS
Qty: 56 TABLET | Refills: 0 | Status: SHIPPED | OUTPATIENT
Start: 2023-10-05 | End: 2023-10-19

## 2023-10-05 RX ORDER — MIDAZOLAM HYDROCHLORIDE 1 MG/ML
2 INJECTION INTRAMUSCULAR; INTRAVENOUS ONCE AS NEEDED
Status: COMPLETED | OUTPATIENT
Start: 2023-10-05 | End: 2023-10-05

## 2023-10-05 RX ORDER — IPRATROPIUM BROMIDE AND ALBUTEROL SULFATE 2.5; .5 MG/3ML; MG/3ML
SOLUTION RESPIRATORY (INHALATION)
Status: COMPLETED
Start: 2023-10-05 | End: 2023-10-05

## 2023-10-05 RX ORDER — TRAMADOL HYDROCHLORIDE 50 MG/1
50 TABLET ORAL
Status: COMPLETED | OUTPATIENT
Start: 2023-10-05 | End: 2023-10-05

## 2023-10-05 RX ORDER — ACETAMINOPHEN 500 MG
1000 TABLET ORAL
Status: COMPLETED | OUTPATIENT
Start: 2023-10-05 | End: 2023-10-05

## 2023-10-05 RX ORDER — VASOPRESSIN 20 [USP'U]/ML
20 INJECTION, SOLUTION INTRAMUSCULAR; SUBCUTANEOUS ONCE
Status: DISCONTINUED | OUTPATIENT
Start: 2023-10-05 | End: 2023-10-05 | Stop reason: HOSPADM

## 2023-10-05 RX ADMIN — FAMOTIDINE 20 MG: 20 TABLET ORAL at 11:10

## 2023-10-05 RX ADMIN — MIDAZOLAM HYDROCHLORIDE 2 MG: 1 INJECTION, SOLUTION INTRAMUSCULAR; INTRAVENOUS at 12:10

## 2023-10-05 RX ADMIN — LIDOCAINE HYDROCHLORIDE 50 MG: 20 INJECTION, SOLUTION EPIDURAL; INFILTRATION; INTRACAUDAL; PERINEURAL at 02:10

## 2023-10-05 RX ADMIN — MEPERIDINE HYDROCHLORIDE 12.5 MG: 25 INJECTION INTRAMUSCULAR; INTRAVENOUS; SUBCUTANEOUS at 02:10

## 2023-10-05 RX ADMIN — DEXAMETHASONE SODIUM PHOSPHATE 8 MG: 4 INJECTION, SOLUTION INTRA-ARTICULAR; INTRALESIONAL; INTRAMUSCULAR; INTRAVENOUS; SOFT TISSUE at 02:10

## 2023-10-05 RX ADMIN — IPRATROPIUM BROMIDE AND ALBUTEROL SULFATE 3 ML: 2.5; .5 SOLUTION RESPIRATORY (INHALATION) at 02:10

## 2023-10-05 RX ADMIN — TRAMADOL HYDROCHLORIDE 50 MG: 50 TABLET, COATED ORAL at 11:10

## 2023-10-05 RX ADMIN — OXYCODONE HYDROCHLORIDE 5 MG: 5 TABLET ORAL at 03:10

## 2023-10-05 RX ADMIN — GABAPENTIN 600 MG: 300 CAPSULE ORAL at 11:10

## 2023-10-05 RX ADMIN — PROPOFOL 200 MG: 10 INJECTION, EMULSION INTRAVENOUS at 02:10

## 2023-10-05 RX ADMIN — SODIUM CHLORIDE, POTASSIUM CHLORIDE, SODIUM LACTATE AND CALCIUM CHLORIDE: 600; 310; 30; 20 INJECTION, SOLUTION INTRAVENOUS at 12:10

## 2023-10-05 RX ADMIN — ONDANSETRON 4 MG: 2 INJECTION INTRAMUSCULAR; INTRAVENOUS at 02:10

## 2023-10-05 RX ADMIN — ACETAMINOPHEN 1000 MG: 500 TABLET ORAL at 11:10

## 2023-10-05 RX ADMIN — KETOROLAC TROMETHAMINE 30 MG: 30 INJECTION, SOLUTION INTRAMUSCULAR at 02:10

## 2023-10-05 NOTE — DISCHARGE INSTRUCTIONS
· Keep follow up appointment  with [facility]. Resume home medications unless otherwise instructed by your doctor.    · Pelvic rest for till return to clinic 2 weeks (NO baths, NO soaking, tampons, douches or sex).    · No heavy lifting/straining for 1 week.    · May return to work when feeling better 3-4 days.    · Take pain medication as prescribed. Alternate Tylenol and Ibuprofen every 3-4 hours. Narcotic pain med ONLY for breakthrough pain. If you are experiencing severe pain even with your pain medications, please call the Womens clinic at [phone] to notify your doctor.    · Take over the counter laxatives such as Miralax for constipation. Do not strain to have a bowel movement.    · Brace belly with pillow when coughing/sneezing/deep breathing.    · No driving or consuming alcohol for the next 24 hours.    · Some bleeding/spotting is to be expected for several days. Discharge can appear to be yellow to brownish, black spots - for up to 2 weeks. There is also a cloth that will come out. ALL NORMAL!    · Notify MD of bleeding more than 1 pad per hour, any moderate to severe pain unrelieved by pain medicine or for any signs of infection including fever above 100.4, yellow/green foul-smelling drainage, nausea or vomiting. Clinics number: [phone], or after business hours or emergency call [phone].    · Thanks for choosing [facility]! Have a smooth recovery!

## 2023-10-05 NOTE — ANESTHESIA PROCEDURE NOTES
Intubation    Date/Time: 10/5/2023 2:00 PM    Performed by: Rafael Hopkins CRNA  Authorized by: Anu Dow MD    Intubation:     Induction:  Intravenous    Intubated:  Postinduction    Mask Ventilation:  Easy mask    Attempted By:  CRNA    Method of Intubation:  Direct    Difficult Airway Encountered?: No      Complications:  None    Airway Device:  Supraglottic airway/LMA    Airway Device Size:  3.0    Placement Verified By:  Capnometry    Complicating Factors:  None    Findings Post-Intubation:  BS equal bilateral

## 2023-10-05 NOTE — BRIEF OP NOTE
[facility] - [facility]  Brief Operative Note    Surgery Date: 10/5/2023     Surgeon(s) and Role:  Shara Lyle MD - Primary  Julia Steve MD - Resident- Assistant   Spring Maravilla MD - Resident- Assistant     Pre-op Diagnosis:   Cervical dysplasia     Post-op Diagnosis:    Cervical dysplasia [N87.9]    Procedure(s) (LRB):  CONE BIOPSY, CERVIX, USING COLD KNIFE (N/A)    Anesthesia: General     Operative Findings:   EUA: Normal external genitalia without lesion. Uterus 8week size, mobile. 3-4cm below pubic arch, 6cm between ischial spines. Minimal descent. No adnexal masses or fullness. Vaginal vault without lesion, discharge, or bleeding. Cervix visualized without lesion or erythema.    Decreased uptake 9 o'clock position with Lugol's.        Estimated Blood Loss: 5mL    Drain: 40mL of urine    Fluids: 300mL         Specimens:   Specimen (24h ago, onward)       Start     Ordered    10/05/23 1429  Specimen to Pathology  RELEASE UPON ORDERING        References:    Click here for ordering Quick Tip   Question:  Release to patient  Answer:  Immediate    10/05/23 1429                      Discharge Note    OUTCOME: Patient tolerated treatment/procedure well without complication and is now ready for discharge.    DISPOSITION: Home or Self Care    FINAL DIAGNOSIS:  Cervical Dysplasia     FOLLOWUP: In clinic    DISCHARGE INSTRUCTIONS:  No discharge procedures on file.     Clinical Reference Documents Added to Patient Instructions         Document    CERVICAL CONIZATION (ENGLISH)            Spring Maravilla MD  [facility] PGY-2  10/05/2023 2:48 PM

## 2023-10-05 NOTE — H&P
39 yo F with h/o cervical dysplasia here for CKC     The patient has been examined and the H&P has been reviewed:  I concur with the findings and no changes have occurred since H&P was written.    Patient cleared for Anesthesia: General    Anesthesia/Surgery risks, benefits and alternative options discussed and understood by patient/family.    There are no hospital problems to display for this patient.          [facility] Preoperative Visit History and Physical    HPI:   Gayatri Márquez 38 y.o.  with a history of cervical dysplasia. History as follows:     Pap:     10/28/2020: ASCUS  2023: HSIL , other HrHPV+     Colposcopy results:  1. Cervix, 6:00, biopsy:  - High-grade squamous intraepithelial lesion (HSIL).       2. Cervix, 3:00, biopsy:  - High-grade squamous intraepithelial lesion (HSIL).        3. Cervix, 12:00, biopsy:  - High-grade squamous intraepithelial lesion (HSIL).        4. Endocervix, ECC:  - Scant strips of endocervical epithelium with predominantly mucus.             PMH:  Past Medical History:   Diagnosis Date    H/O syphilis     Herpes simplex virus (HSV) infection     Valtrex PRN    Human immunodeficiency virus (HIV) disease     On triumeq, VL undetectable 2023   History of a right nephrectomy 2/2 pyelonephritis with postop LLE DVT (), left renal vein thrombus    ObHx:  OB History    Para Term  AB Living   4 2 2   2 2   SAB IAB Ectopic Multiple Live Births   2       2      # Outcome Date GA Lbr Clifford/2nd Weight Sex Delivery Anes PTL Lv   4 Term     F Vag-Spont   DARSHAN   3 Term     M Vag-Spont   DARSHAN   2 SAB         FD   1 SAB         FD   BB 7lbs    GynHx:  Triad: 11/irreg/4-7  LMP: 2023  Contraception: IUD     SurgHx:  Past Surgical History:   Procedure Laterality Date    COLPOSCOPY, WITH BIOPSY OF CERVIX  2023    NEPHRECTOMY  2014    right    VASCULAR SURGERY         FamHx:  Family History   Problem Relation Age of Onset    Arthritis Mother      Depression Mother     Hyperlipidemia Mother     Migraines Mother     Heart disease Father     Depression Maternal Grandmother     Diabetes Maternal Grandmother     Hypertension Maternal Grandmother     Coronary artery disease Paternal Grandmother     Diabetes Paternal Grandmother     Cardiomyopathy Paternal Grandfather     COPD Paternal Grandfather     Kidney disease Paternal Grandfather        SocialHx:  Social History     Socioeconomic History    Marital status: Significant Other    Number of children: 2   Tobacco Use    Smoking status: Former     Current packs/day: 0.00     Types: Vaping with nicotine, Cigarettes     Quit date: 3/3/2023     Years since quittin.5    Smokeless tobacco: Current    Tobacco comments:     Stopped smoking cigarettes x 2 weeks. Vaping for now   Substance and Sexual Activity    Alcohol use: Not Currently    Drug use: Yes     Frequency: 7.0 times per week     Types: Marijuana    Sexual activity: Yes     Partners: Male     Birth control/protection: None     Social Determinants of Health     Financial Resource Strain: Low Risk  (2022)    Overall Financial Resource Strain (CARDIA)     Difficulty of Paying Living Expenses: Not hard at all   Food Insecurity: No Food Insecurity (2022)    Hunger Vital Sign     Worried About Running Out of Food in the Last Year: Never true     Ran Out of Food in the Last Year: Never true   Transportation Needs: No Transportation Needs (2022)    PRAPARE - Transportation     Lack of Transportation (Medical): No     Lack of Transportation (Non-Medical): No   Physical Activity: Inactive (2022)    Exercise Vital Sign     Days of Exercise per Week: 0 days     Minutes of Exercise per Session: 0 min   Stress: Stress Concern Present (2022)    [employer] [place] [employer] - Occupational Stress Questionnaire     Feeling of Stress : To some extent   Social Connections: Moderately Isolated (2022)    Social Connection and  Isolation Panel [NHANES]     Frequency of Communication with Friends and Family: More than three times a week     Frequency of Social Gatherings with Friends and Family: Never     Attends Jewish Services: Never     Active Member of Clubs or Organizations: No     Attends Club or Organization Meetings: Never     Marital Status: Living with partner   Housing Stability: Low Risk  (11/30/2022)    Housing Stability Vital Sign     Unable to Pay for Housing in the Last Year: No     Number of Places Lived in the Last Year: 1     Unstable Housing in the Last Year: No       All:    Review of patient's allergies indicates:   Allergen Reactions    Sulfamethoxazole-trimethoprim Hives       Meds:  Prior to Admission medications    Medication Sig Start Date End Date Taking? Authorizing Provider   abacavir-dolutegravir-lamivud (TRIUMEQ) 600- mg Tab Take 1 tablet by mouth once daily. 8/3/23   Ashley Quiroz APRN   ascorbic acid, vitamin C, (VITAMIN C) 100 MG tablet Take 500 mg by mouth once daily.    Provider, Historical   buPROPion (WELLBUTRIN XL) 150 MG TB24 tablet Take 150 mg by mouth. 7/20/23   Provider, Historical   chlorhexidine (PERIDEX) 0.12 % solution Take 15 mLs by mouth daily as needed for Other. Oral pain 11/4/21   Provider, Historical   ergocalciferol (VITAMIN D2) 50,000 unit Cap Take 1 capsule (50,000 Units total) by mouth once daily. 3/7/23 3/6/24  Ashley Quiroz APRN   ferrous sulfate (FEROSUL) 325 mg (65 mg iron) Tab tablet Take 1 tablet (325 mg total) by mouth once daily. 5/1/23   Ashley Quiroz APRN   multivitamin Tab Take 1 tablet by mouth once daily. 11/30/22 11/30/23  Jarred Curtis FNP   valACYclovir (VALTREX) 1000 MG tablet Take 1 tablet (1,000 mg total) by mouth once daily. for 5 days 3/7/23 8/3/23  Ashley Quiroz APRN         Review of Systems: As stated in HPI.      Objective:     /85  HR 72  BMI 21    PHYSICAL EXAM  GEN: NAD, A&O x3  CV: RRR   LUNGS:  CTABL  ABDOMEN: soft, NTND, no masses    Colposcopic impression        '  Labs:   2023 CD4 356, VL undetectable  Hep C neg  TB neg  4.75>14.9/44.2<222    Assessment:      38 y.o.  presents for surgical management of cervical dysplasia     1. Cervical dysplasia               Plan:     Counseling: This procedure and its risks, benefits, alternatives (such as LEEP) and complications (including injury to bowel, bladder, major blood vessel, ureter, bleeding, possibility of transfusion, infection, scarring, dyspareunia, erosion, further surgery, incontinence, failure of the procedure, or fistula formation) were reviewed in detail. We reviewed risks and indication for cold knife conization and discussed the risk that not all of the abnormal cells are removed, and the possible indication for repeat excision or hysterectomy if repeat excision is not feasible. Patient was counseled regarding the rates of cervical dysplasia progression, persistence and regression. In the case of CIN3, 30% rate of regression without intervention and 10% progression to cancer without intervention. Patient was informed that pathology would be reviewed at her 2 week postop visit. Patient notes she is likely done with childbearing, however we did review that in the event she becomes pregnant there is an increased risk of cervical insufficiency,  labor, cervical stenosis, and low-birthweight  s/p CKC. We also reviewed patient's risk factors for cervical cancer. Discussed the Gardasil vaccine series is now approved for men and women up to age 45. Counseled that while it would not protect her from HPV strains she is already infected with, it can protect her from other low and high risk strains. We further discussed the recommendation for two dose HPV vaccination in both girls and boys as early as age 9 and she plans to have her children vaccinated.   We have reviewed the typical perioperative course with hospital stay, and  post-operative precautions and restrictions have been reviewed.    Patient counseled on the fact that cystotomy complicates approximately 0.3 to 11/1000 benign gynecologic surgeries.   Transfusion of blood and blood products discussed. Patient was consented for blood transfusion in the case of an emergency.  She understands the possibility of blood transfusion reaction and the attendant risk of transmission of HIV & Hepatitis C to be 1 in 2 million and the risk of Hepatitis B to be 1 in 200,000.  She is aware of the possibility of transfusion reaction. All questions were answered.   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  She has consented to an exam under anesthesia and understands that medical students participate in this portion of the procedure.  All questions were answered.    Preop testing ordered.  Surgery case requested. Surgical consents signed.  Instructions reviewed, including NPO after midnight.    PAT appointment requested   Current contraception: Mirena IUD    To OR for CKC with Dr. Lyle   Scheduled on 10/5/2023    Discussed with Dr. Provost Julia Steve MD MPH   [facility], PGY-3  *

## 2023-10-05 NOTE — TRANSFER OF CARE
Anesthesia Transfer of Care Note    Patient: Gayatri Márquez    Procedure(s) Performed: Procedure(s) (LRB):  CONE BIOPSY, CERVIX, USING COLD KNIFE (N/A)    Patient location: PACU    Anesthesia Type: general    Transport from OR: Transported from OR on room air with adequate spontaneous ventilation    Post assessment: no apparent anesthetic complications    Post vital signs: stable    Level of consciousness: awake    Nausea/Vomiting: no nausea/vomiting    Complications: none          Last vitals:   Visit Vitals  /72   Pulse 72   Temp 36.3 °C (97.3 °F) (Temporal)   Resp 20   Wt 51.2 kg (112 lb 12.8 oz)   SpO2 100%   Breastfeeding No   BMI 20.63 kg/m²

## 2023-10-05 NOTE — ANESTHESIA POSTPROCEDURE EVALUATION
Anesthesia Post Evaluation    Patient: Gayatri Márquez    Procedure(s) Performed: Procedure(s) (LRB):  CONE BIOPSY, CERVIX, USING COLD KNIFE (N/A)    Final Anesthesia Type: general      Patient location during evaluation: PACU  Post-procedure vital signs: reviewed and stable  Airway patency: patent      Anesthetic complications: no      Cardiovascular status: hemodynamically stable  Respiratory status: spontaneous ventilation  Follow-up not needed.          Vitals Value Taken Time   /94 10/05/23 1500   Temp 36.2 °C (97.2 °F) 10/05/23 1448   Pulse 75 10/05/23 1500   Resp 22 10/05/23 1500   SpO2 100 % 10/05/23 1500         No case tracking events are documented in the log.      Pain/Corwin Score: Pain Rating Prior to Med Admin: 0 (10/5/2023 11:46 AM)

## 2023-10-06 VITALS
SYSTOLIC BLOOD PRESSURE: 122 MMHG | WEIGHT: 112.81 LBS | RESPIRATION RATE: 18 BRPM | BODY MASS INDEX: 20.63 KG/M2 | OXYGEN SATURATION: 97 % | DIASTOLIC BLOOD PRESSURE: 87 MMHG | HEART RATE: 75 BPM | TEMPERATURE: 98 F

## 2023-10-06 PROBLEM — N87.9 CERVICAL DYSPLASIA: Status: ACTIVE | Noted: 2023-10-06

## 2023-10-06 NOTE — OP NOTE
[facility]  Surgery Department  Operative Note    SUMMARY     Date of Procedure: 10/5/2023     Procedure:  CONE BIOPSY, CERVIX, USING COLD KNIFE (N/A)     Surgeon(s) and Role:  Shara Lyle MD - Primary  Julia Steve MD - Resident- Assistant   Spring Maravilla MD - Resident- Assistant     Pre-Operative Diagnosis:   Cervical dysplasia     Post-Operative Diagnosis:   Cervical dysplasia [N87.9]    Anesthesia: General    Operative Findings (including complications, if any):   EUA: Normal external genitalia without lesion. Uterus 8week size, mobile. 3-4cm below pubic arch, 6cm between ischial spines. Minimal descent. No adnexal masses or fullness. Vaginal vault without lesion, discharge, or bleeding. Cervix visualized without lesion or erythema. IUD strings visualized coming out of the os.     Decreased uptake 9 o'clock position with Lugol's.      Description of Technical Procedures:   Patient was taken to the operating room with IV fluids running. She was placed in the dorsal lithotomy position. General anesthesia was obtained without difficulty and patient was examined with findings as indicated above. She was then prepped and draped in the typical sterile fashion.      A weighted speculum was placed into the vagina as well as a right angle retractor. Lugols was placed on the cervix with findings as indicated above. Next, the anterior lip of the cervix was grasped with the single tooth tenaculum. 2-0 vicryl suture was used to place stay sutures at 3 and 9 o'clock. A total of 10 mL of vasopressin was then injected at 2 o'clock, 4 o'clock, 8 o'clock and 10 o'clock. An 11 blade was then used to circumferentially excise the lesion starting at the 6 o'clock position. Careful attention was taken to avoid the vaginal side walls. After the cone specimen was created it was amputated and tagged at the 12 o'clock position, the IUD strings that were within the endocervical canal noted to be cut. A  fragment of specimen still noted to be attached at 9 o'clock boarder, this was then removed and tagged. An ECC was obtained. All specimens were sent for pathology. IUD remained in place at the conclusion of this procedure. Next, the Bovie with the rollerball was used to achieve hemostasis at the cone bed. Surgicel with monsels was placed in the cone bed. All counts were correct x2 and all instruments were removed from the vagina.    Dr. Lyle was present for the entire procedure    Estimated Blood Loss: 5mL     Drain: 40mL of urine     Fluids: 300mL    Specimens:   Specimen (24h ago, onward)       Start     Ordered    10/05/23 1429  Specimen to Pathology  RELEASE UPON ORDERING        References:    Click here for ordering Quick Tip   Question:  Release to patient  Answer:  Immediate    10/05/23 1429                            Condition: Good    Disposition: PACU - hemodynamically stable.    Spring Maravilla MD  [facility] PGY-2  10/05/2023 8:20 PM

## 2023-10-15 ENCOUNTER — DOCUMENTATION ONLY (OUTPATIENT)
Dept: GYNECOLOGY | Facility: CLINIC | Age: 38
End: 2023-10-15
Payer: MEDICAID

## 2023-10-15 NOTE — PROGRESS NOTES
Pathology Conference       Surgery Date: 10/5/2023   Patient: Gayatri Márquez 37yo    Pre-Op Diagnosis: Cervical dysplasia (3,6,12 HSIL)     Procedure:  CKC     Path: 1. Cervix, cone biopsy: High-grade squamous intraepithelial lesion (HSIL) extending into endocervical glands.   - The surgical margins are negative for squamous intraepithelial lesion/dysplasia.    2. Endocervix, endocervical currettings:    - Fragment of blood clot.    - No endocervical tissue present for evaluation.     Plan: 6m f/u HPV base co-testing      Spring Maravilla MD  [facility] PGY-2  10/15/2023 1:07 PM

## 2023-11-06 ENCOUNTER — OFFICE VISIT (OUTPATIENT)
Dept: INFECTIOUS DISEASES | Facility: CLINIC | Age: 38
End: 2023-11-06
Payer: MEDICAID

## 2023-11-06 VITALS
BODY MASS INDEX: 21.02 KG/M2 | HEIGHT: 62 IN | HEART RATE: 69 BPM | WEIGHT: 114.19 LBS | SYSTOLIC BLOOD PRESSURE: 114 MMHG | RESPIRATION RATE: 18 BRPM | DIASTOLIC BLOOD PRESSURE: 73 MMHG | TEMPERATURE: 98 F

## 2023-11-06 DIAGNOSIS — Z23 NEED FOR VACCINATION: ICD-10-CM

## 2023-11-06 DIAGNOSIS — Z11.3 ROUTINE SCREENING FOR STI (SEXUALLY TRANSMITTED INFECTION): ICD-10-CM

## 2023-11-06 DIAGNOSIS — B20 HIV DISEASE: Primary | ICD-10-CM

## 2023-11-06 LAB
ALBUMIN SERPL-MCNC: 4.3 G/DL (ref 3.5–5)
ALBUMIN/GLOB SERPL: 1.3 RATIO (ref 1.1–2)
ALP SERPL-CCNC: 65 UNIT/L (ref 40–150)
ALT SERPL-CCNC: 13 UNIT/L (ref 0–55)
AST SERPL-CCNC: 15 UNIT/L (ref 5–34)
BILIRUB SERPL-MCNC: 0.3 MG/DL
BUN SERPL-MCNC: 12.7 MG/DL (ref 7–18.7)
C TRACH DNA SPEC QL NAA+PROBE: NOT DETECTED
CALCIUM SERPL-MCNC: 9.8 MG/DL (ref 8.4–10.2)
CHLORIDE SERPL-SCNC: 107 MMOL/L (ref 98–107)
CO2 SERPL-SCNC: 27 MMOL/L (ref 22–29)
CREAT SERPL-MCNC: 1.06 MG/DL (ref 0.55–1.02)
GFR SERPLBLD CREATININE-BSD FMLA CKD-EPI: >60 MLS/MIN/1.73/M2
GLOBULIN SER-MCNC: 3.4 GM/DL (ref 2.4–3.5)
GLUCOSE SERPL-MCNC: 56 MG/DL (ref 74–100)
N GONORRHOEA DNA SPEC QL NAA+PROBE: NOT DETECTED
POTASSIUM SERPL-SCNC: 3.7 MMOL/L (ref 3.5–5.1)
PROT SERPL-MCNC: 7.7 GM/DL (ref 6.4–8.3)
RPR SER QL: NORMAL
RPR SER-TITR: NORMAL {TITER}
SODIUM SERPL-SCNC: 144 MMOL/L (ref 136–145)
SOURCE (OHS): NORMAL
T PALLIDUM AB SER QL: REACTIVE

## 2023-11-06 PROCEDURE — 3074F SYST BP LT 130 MM HG: CPT | Mod: CPTII,,, | Performed by: NURSE PRACTITIONER

## 2023-11-06 PROCEDURE — 99214 OFFICE O/P EST MOD 30 MIN: CPT | Mod: S$PBB,,, | Performed by: NURSE PRACTITIONER

## 2023-11-06 PROCEDURE — 87491 CHLMYD TRACH DNA AMP PROBE: CPT | Performed by: NURSE PRACTITIONER

## 2023-11-06 PROCEDURE — 3008F BODY MASS INDEX DOCD: CPT | Mod: CPTII,,, | Performed by: NURSE PRACTITIONER

## 2023-11-06 PROCEDURE — 3008F PR BODY MASS INDEX (BMI) DOCUMENTED: ICD-10-PCS | Mod: CPTII,,, | Performed by: NURSE PRACTITIONER

## 2023-11-06 PROCEDURE — 1159F PR MEDICATION LIST DOCUMENTED IN MEDICAL RECORD: ICD-10-PCS | Mod: CPTII,,, | Performed by: NURSE PRACTITIONER

## 2023-11-06 PROCEDURE — 87591 N.GONORRHOEAE DNA AMP PROB: CPT | Performed by: NURSE PRACTITIONER

## 2023-11-06 PROCEDURE — 86780 TREPONEMA PALLIDUM: CPT | Performed by: NURSE PRACTITIONER

## 2023-11-06 PROCEDURE — 99214 OFFICE O/P EST MOD 30 MIN: CPT | Mod: PBBFAC | Performed by: NURSE PRACTITIONER

## 2023-11-06 PROCEDURE — 80053 COMPREHEN METABOLIC PANEL: CPT | Performed by: NURSE PRACTITIONER

## 2023-11-06 PROCEDURE — 3078F DIAST BP <80 MM HG: CPT | Mod: CPTII,,, | Performed by: NURSE PRACTITIONER

## 2023-11-06 PROCEDURE — 3078F PR MOST RECENT DIASTOLIC BLOOD PRESSURE < 80 MM HG: ICD-10-PCS | Mod: CPTII,,, | Performed by: NURSE PRACTITIONER

## 2023-11-06 PROCEDURE — 87536 HIV-1 QUANT&REVRSE TRNSCRPJ: CPT | Performed by: NURSE PRACTITIONER

## 2023-11-06 PROCEDURE — 1160F RVW MEDS BY RX/DR IN RCRD: CPT | Mod: CPTII,,, | Performed by: NURSE PRACTITIONER

## 2023-11-06 PROCEDURE — 86592 SYPHILIS TEST NON-TREP QUAL: CPT | Performed by: NURSE PRACTITIONER

## 2023-11-06 PROCEDURE — 1160F PR REVIEW ALL MEDS BY PRESCRIBER/CLIN PHARMACIST DOCUMENTED: ICD-10-PCS | Mod: CPTII,,, | Performed by: NURSE PRACTITIONER

## 2023-11-06 PROCEDURE — 99214 PR OFFICE/OUTPT VISIT, EST, LEVL IV, 30-39 MIN: ICD-10-PCS | Mod: S$PBB,,, | Performed by: NURSE PRACTITIONER

## 2023-11-06 PROCEDURE — 1159F MED LIST DOCD IN RCRD: CPT | Mod: CPTII,,, | Performed by: NURSE PRACTITIONER

## 2023-11-06 PROCEDURE — 3074F PR MOST RECENT SYSTOLIC BLOOD PRESSURE < 130 MM HG: ICD-10-PCS | Mod: CPTII,,, | Performed by: NURSE PRACTITIONER

## 2023-11-06 PROCEDURE — 90471 IMMUNIZATION ADMIN: CPT | Mod: PBBFAC

## 2023-11-06 PROCEDURE — 36415 COLL VENOUS BLD VENIPUNCTURE: CPT | Performed by: NURSE PRACTITIONER

## 2023-11-06 PROCEDURE — 90686 IIV4 VACC NO PRSV 0.5 ML IM: CPT | Mod: PBBFAC

## 2023-11-06 RX ORDER — ABACAVIR SULFATE, DOLUTEGRAVIR SODIUM, LAMIVUDINE 600; 50; 300 MG/1; MG/1; MG/1
1 TABLET, FILM COATED ORAL DAILY
Qty: 90 TABLET | Refills: 1 | Status: SHIPPED | OUTPATIENT
Start: 2023-11-06 | End: 2024-03-06 | Stop reason: SDUPTHER

## 2023-11-06 RX ADMIN — INFLUENZA VIRUS VACCINE 0.5 ML: 15; 15; 15; 15 SUSPENSION INTRAMUSCULAR at 08:11

## 2023-11-06 NOTE — PROGRESS NOTES
Patient ID: Gayatri Márquez 38 y.o.     Chief Complaint:   Chief Complaint   Patient presents with    Followup HIV     Denies problems        HPI:    11/6/23   Gayatri is a 37 yo WF here today for HIV f/u visit.  She is taking Triumeq daily & is tolerating it well.  Labs 8/23 VL UD, CD4 434.  She underwent CKC 10/5/23 and is fully recovered, feeling much better.  Pathology resulted HGSIL with clear margins, pt voiced appreciation for results.  She tells me that she did go to [place] with her mother & stayed for a week, but her boyfriend convinced her to come back home & relationship has been much better. She has not had any new partners, but appreciates STI screenings today. Last RPR NR 3/23, CT/GC negative 4/23.  She denies anal intercourse, does engage in oral. Will screen oral for ct/gc as well.  She does have a cluster of serous blistering lesions to left lip, using mupirocin ointment but not effective. Lesion burns and is uncomfortable, present x 2 weeks. Not currently taking Valtrex but does have at home. Will resume today. States that partner did attend visit with PCP ESTEVAN Pack & should have undergone testing but she does not attend visits with him. Amenable to flu vaccine today. All questions answered & concerns addressed.    8/3/23  Gayatri is a 36 yo WF presenting today for HIV f/u visit.  She tells me that her Triumeq disappeared from her dresser where medications are kept about 1.5-2 weeks ago.  She states that since her mother-in-law passed away, there has been a lot of traffic in & out of her home.  She tells me that her boyfriend is very controlling and she is strongly considering getting a bus ticket to move to [place] to meet her mother. She denies SI/HI. Last labs 3/23 VL UD, Cd4 659.  She attended visit with GYN 7/28/23 for colposcopy, states that she is bleeding heavier after this procedure compared to prior colpo.  She is filling 2 pads per day & is having cramping. Will get CBC today with routine  labs. All questions answered & concerns addressed.     3/7/23  Gayatri is a 36 yo WF here today for HIV f/u visit.  She takes Triumeq daily & is virally suppressed. Labs 8/22 VL UD, CD4 814. She is taking iron as prescribed by PCP.  She tells me that she has been having recurrent outbreaks of cold sores on her lips.  She has a known history of HSV, but usually only has an outbreak every few years. She has been under increased stress with her mother in law being very ill and in/out of hospital & ICU.  Will treat with Valtrex for same. She has quit smoking x 2 weeks now, efforts applauded. She is scheduled for GYN appt 4/13/23, plans to attend. All questions answered & concerns addressed.         Past Medical History:   Diagnosis Date    H/O syphilis     Herpes simplex virus (HSV) infection     Valtrex PRN    Human immunodeficiency virus (HIV) disease     On triumeq, VL undetectable 8/2023        Past Surgical History:   Procedure Laterality Date    COLD KNIFE CONIZATION OF CERVIX N/A 10/5/2023    Procedure: CONE BIOPSY, CERVIX, USING COLD KNIFE;  Surgeon: Shara Lyle MD;  Location: [facility];  Service: OB/GYN;  Laterality: N/A;    COLPOSCOPY, WITH BIOPSY OF CERVIX  07/28/2023    NEPHRECTOMY  05/19/2014    right    VASCULAR SURGERY          Social History     Socioeconomic History    Marital status: Significant Other    Number of children: 2   Tobacco Use    Smoking status: Former     Types: Vaping with nicotine    Smokeless tobacco: Current    Tobacco comments:     Stopped smoking cigarettes x 2 weeks. Vaping for now   Substance and Sexual Activity    Alcohol use: Not Currently    Drug use: Yes     Frequency: 7.0 times per week     Types: Marijuana    Sexual activity: Yes     Partners: Male     Birth control/protection: None     Social Determinants of Health     Financial Resource Strain: Low Risk  (11/30/2022)    Overall Financial Resource Strain (CARDIA)     Difficulty of Paying Living Expenses: Not hard at all    Food Insecurity: No Food Insecurity (11/30/2022)    Hunger Vital Sign     Worried About Running Out of Food in the Last Year: Never true     Ran Out of Food in the Last Year: Never true   Transportation Needs: No Transportation Needs (11/30/2022)    PRAPARE - Transportation     Lack of Transportation (Medical): No     Lack of Transportation (Non-Medical): No   Physical Activity: Inactive (11/30/2022)    Exercise Vital Sign     Days of Exercise per Week: 0 days     Minutes of Exercise per Session: 0 min   Stress: Stress Concern Present (11/30/2022)    [facility] [facility] [employer] [facility] - Occupational Stress Questionnaire     Feeling of Stress : To some extent   Social Connections: Moderately Isolated (11/30/2022)    Social Connection and Isolation Panel [NHANES]     Frequency of Communication with Friends and Family: More than three times a week     Frequency of Social Gatherings with Friends and Family: Never     Attends [employer] Services: Never     Active Member of Clubs or Organizations: No     Attends Club or Organization Meetings: Never     Marital Status: Living with partner   Housing Stability: Low Risk  (11/30/2022)    Housing Stability Vital Sign     Unable to Pay for Housing in the Last Year: No     Number of Places Lived in the Last Year: 1     Unstable Housing in the Last Year: No        Family History   Problem Relation Age of Onset    Arthritis Mother     Depression Mother     Hyperlipidemia Mother     Migraines Mother     Heart disease Father     Depression Maternal Grandmother     Diabetes Maternal Grandmother     Hypertension Maternal Grandmother     Coronary artery disease Paternal Grandmother     Diabetes Paternal Grandmother     Cardiomyopathy Paternal Grandfather     COPD Paternal Grandfather     Kidney disease Paternal Grandfather         Review of patient's allergies indicates:   Allergen Reactions    Sulfamethoxazole-trimethoprim Hives        Immunization History   Administered  "Date(s) Administered    HPV 9-Valent 08/12/2019, 10/09/2019, 10/14/2020    Hepatitis B 03/23/1998, 08/05/1998    Hepatitis B, Pediatric/Adolescent 10/06/1998    Influenza - Quadrivalent 10/14/2020, 10/11/2021    Influenza - Quadrivalent - PF *Preferred* (6 months and older) 10/09/2019, 10/14/2020, 10/11/2021, 11/30/2022, 11/06/2023    Meningococcal Conjugate (MCV4P) 12/09/2019, 01/22/2021    OPV 03/23/1998    PPD Test 06/14/2001    Pneumococcal Conjugate - 13 Valent 08/12/2019    Pneumococcal Polysaccharide - 23 Valent 12/09/2019    Td (ADULT) 03/23/1998, 08/05/1998    Tdap 03/24/2009, 11/05/2020        Review of Systems   Constitutional: Negative.    HENT: Negative.     Eyes: Negative.    Respiratory: Negative.     Cardiovascular: Negative.    Gastrointestinal: Negative.    Genitourinary: Negative.    Musculoskeletal: Negative.    Skin: Negative.    Neurological: Negative.    Endo/Heme/Allergies: Negative.    Psychiatric/Behavioral: Negative.     All other systems reviewed and are negative.         Objective:      /73 (BP Location: Left arm, Patient Position: Sitting, BP Method: Medium (Automatic))   Pulse 69   Temp 98.2 °F (36.8 °C) (Oral)   Resp 18   Ht 5' 2" (1.575 m)   Wt 51.8 kg (114 lb 3.2 oz)   BMI 20.89 kg/m²      Physical Exam  Vitals reviewed.   Constitutional:       General: She is not in acute distress.     Appearance: Normal appearance. She is not toxic-appearing.   HENT:      Mouth/Throat:      Mouth: Mucous membranes are moist.      Pharynx: Oropharynx is clear.   Eyes:      Conjunctiva/sclera: Conjunctivae normal.   Cardiovascular:      Rate and Rhythm: Normal rate and regular rhythm.   Pulmonary:      Effort: Pulmonary effort is normal. No respiratory distress.      Breath sounds: Normal breath sounds.   Abdominal:      General: Abdomen is flat. Bowel sounds are normal.      Palpations: Abdomen is soft.   Musculoskeletal:         General: Normal range of motion.      Cervical back: " Normal range of motion.   Lymphadenopathy:      Cervical: No cervical adenopathy.   Skin:     General: Skin is warm and dry.      Findings: Lesion present.          Neurological:      General: No focal deficit present.      Mental Status: She is alert and oriented to person, place, and time. Mental status is at baseline.   Psychiatric:         Mood and Affect: Mood normal.         Behavior: Behavior normal.          Labs:   Lab Results   Component Value Date    WBC 4.75 08/03/2023    HGB 14.9 08/03/2023    HCT 44.2 08/03/2023    .5 (H) 08/03/2023     08/03/2023       CMP  Sodium Level   Date Value Ref Range Status   08/03/2023 141 136 - 145 mmol/L Final     Potassium Level   Date Value Ref Range Status   08/03/2023 4.7 3.5 - 5.1 mmol/L Final     Carbon Dioxide   Date Value Ref Range Status   08/03/2023 27 22 - 29 mmol/L Final     Blood Urea Nitrogen   Date Value Ref Range Status   08/03/2023 10.8 7.0 - 18.7 mg/dL Final     Creatinine   Date Value Ref Range Status   08/03/2023 1.00 0.55 - 1.02 mg/dL Final     Calcium Level Total   Date Value Ref Range Status   08/03/2023 9.4 8.4 - 10.2 mg/dL Final     Albumin Level   Date Value Ref Range Status   08/03/2023 4.4 3.5 - 5.0 g/dL Final     Bilirubin Total   Date Value Ref Range Status   08/03/2023 0.3 <=1.5 mg/dL Final     Alkaline Phosphatase   Date Value Ref Range Status   08/03/2023 56 40 - 150 unit/L Final     Aspartate Aminotransferase   Date Value Ref Range Status   08/03/2023 14 5 - 34 unit/L Final     Alanine Aminotransferase   Date Value Ref Range Status   08/03/2023 14 0 - 55 unit/L Final     eGFR   Date Value Ref Range Status   08/03/2023 >60 mls/min/1.73/m2 Final     Lab Results   Component Value Date    TSH 0.9543 11/22/2022     Hep C Ab Interp   Date Value Ref Range Status   08/03/2023 Nonreactive Nonreactive Final     Syphilis Antibody   Date Value Ref Range Status   03/07/2023 Reactive (A) Nonreactive, Equivocal Final     RPR   Date Value  Ref Range Status   03/07/2023 Non-Reactive Non-Reactive Final     Cholesterol Total   Date Value Ref Range Status   11/22/2022 158 <=200 mg/dL Final     HDL Cholesterol   Date Value Ref Range Status   11/22/2022 62 (H) 35 - 60 mg/dL Final     Triglyceride   Date Value Ref Range Status   11/22/2022 69 37 - 140 mg/dL Final     Cholesterol/HDL Ratio   Date Value Ref Range Status   11/22/2022 3 0 - 5 Final     Very Low Density Lipoprotein   Date Value Ref Range Status   11/22/2022 14  Final     LDL Cholesterol   Date Value Ref Range Status   11/22/2022 82.00 50.00 - 140.00 mg/dL Final     Vit D 25 OH   Date Value Ref Range Status   11/22/2022 24.4 (L) 30.0 - 80.0 ng/mL Final     Results for orders placed or performed in visit on 03/07/23   CD4 Lymphocytes   Result Value Ref Range    Patient Age 37     WBC Absolute 5,500 4,500 - 11,500 /mm3    Lymph Percent 29 28 - 48 %    Lymph Absolute 1,595 1,260 - 5,520 x10(3)/mcL    CD4 % 41.3 %    CD4 Absolute 658.735 589 - 1,505 unit/L    T Cell Interp       Normal absolute lymphocyte count with normal absolute CD4+ lymphocyte count.  Riki Thompson MD       Results for orders placed or performed in visit on 08/03/23   HIV-1 RNA, Quantitative, PCR with Reflex to Genotype   Result Value Ref Range    HIV-1 RNA Detect/Quant, P Undetected Undetected copies/mL     Results for orders placed or performed in visit on 03/07/23   Quantiferon Gold TB   Result Value Ref Range    QuantiFERON-Tb Gold Plus Result Negative Negative    TB1 Ag minus Nil Result 0.00 IU/mL    TB2 Ag minus Nil Result 0.00 IU/mL    Mitogen minus Nil Result 2.85 IU/mL    Nil Result 0.01 IU/mL     No results found for this or any previous visit.  Results for orders placed or performed in visit on 05/04/22   Urinalysis   Result Value Ref Range    Color, UA Light-Yellow (A) Yellow, Colorless, Other, Clear    Appearance, UA Clear Clear    Specific Gravity, UA 1.020     pH, UA 5.5 5.0, 5.5, 6.0, 6.5, 7.0, 7.5, 8.0, 8.5     Protein, UA Negative Negative mg/dL    Glucose, UA Normal Negative, Normal mg/dL    Ketones, UA Negative Negative, +1, +2, +3, +4, +5 mg/dL    Blood, UA Negative Negative unit/L    Bilirubin, UA Negative Negative mg/dL    Urobilinogen, UA 0.2 0.2, 1.0 mg/dL    Nitrites, UA Negative Negative    Leukocyte Esterase, UA Negative Negative, 75  unit/L    WBC, UA 0-5 None Seen, 0-2, 3-5, 0-5 /HPF    Bacteria, UA None Seen None Seen /HPF    Squamous Epithelial Cells, UA Trace (A) None Seen /HPF    Mucous, UA Trace (A) None Seen /LPF    Hyaline Casts, UA None Seen None Seen /lpf    Narrative    RBC RESULT = 0-5    REFERENCE RANGE = <6-10       Imaging: Reviewed most recent relevant imaging studies available, notable results highlighted in this note    Medications:     Current Outpatient Medications   Medication Instructions    abacavir-dolutegravir-lamivud (TRIUMEQ) 600- mg Tab 1 tablet, Oral, Daily    ascorbic acid (vitamin C) (VITAMIN C) 500 mg, Oral, Daily    buPROPion (WELLBUTRIN XL) 150 mg, Oral    chlorhexidine (PERIDEX) 0.12 % solution 15 mLs, Oral, Daily PRN, Oral pain    ciprofloxacin HCl (CIPRO) 500 mg, Oral, 2 times daily    ergocalciferol (VITAMIN D2) 50,000 Units, Oral, Daily    ferrous sulfate (FEROSUL) 325 mg, Oral, Daily    multivitamin Tab 1 tablet, Oral, Daily    mupirocin (BACTROBAN) 2 % ointment Topical (Top), 3 times daily    oxyCODONE (ROXICODONE) 5 mg, Oral, Every 4 hours PRN    valACYclovir (VALTREX) 1,000 mg, Oral, Daily       Assessment:       Problem List Items Addressed This Visit    None  Visit Diagnoses       HIV disease    -  Primary    Relevant Medications    abacavir-dolutegravir-lamivud (TRIUMEQ) 600- mg Tab    Other Relevant Orders    HIV-1 RNA, Quantitative, PCR with Reflex to Genotype    Comprehensive Metabolic Panel    Routine screening for STI (sexually transmitted infection)        Relevant Orders    SYPHILIS ANTIBODY (WITH REFLEX RPR)    Chlamydia/GC, PCR     C.trach/N.gonor AMP RNA    Need for vaccination        Relevant Medications    influenza (QUADRIVALENT PF) vaccine 0.5 mL (Completed)               Plan:      HIV disease  -     abacavir-dolutegravir-lamivud (TRIUMEQ) 600- mg Tab; Take 1 tablet by mouth once daily.  Dispense: 90 tablet; Refill: 1  -     HIV-1 RNA, Quantitative, PCR with Reflex to Genotype; Future; Expected date: 11/06/2023  -     Comprehensive Metabolic Panel  Diagnosed 6/2019.  VL [facility] 8260, CD4 317.  Adherence and sexual health counseling done.  Use condoms for all sexual encounters.  Blood precautions.  Continue Triumeq as prescribed.  Labs today as ordered.  RTC 4 months with Ashley.      Wellness:  Cervical pap: colpo 2/21, 7/23 HGSIL, CKC 10/23 HGSIL  Anal pap: 10/20 Neg  Cervical CT/GC: 4/23 Neg, 11/23  Anal CT/GC: 10/20 Neg  Oral CT/GC: 10/20 Neg, 11/23  Eye exam: 3/21     Routine screening for STI (sexually transmitted infection)  -     SYPHILIS ANTIBODY (WITH REFLEX RPR); Future; Expected date: 11/06/2023  -     Chlamydia/GC, PCR  -     C.trach/N.gonor AMP RNA; Future; Expected date: 11/06/2023  RPR NR 3/23  Repeat today.     Need for vaccination  -     influenza (QUADRIVALENT PF) vaccine 0.5 mL  Flu vaccine today.

## 2023-11-07 LAB
C TRACH RRNA SPEC QL NAA+PROBE: NEGATIVE
N GONORRHOEA RRNA SPEC QL NAA+PROBE: NEGATIVE
SPECIMEN SOURCE: NORMAL
SPECIMEN SOURCE: NORMAL

## 2023-11-08 LAB
HIV1 RNA # PLAS NAA DL=20: <20 COPIES/ML
T PALLIDUM AB SER QL: REACTIVE

## 2023-12-01 ENCOUNTER — TELEPHONE (OUTPATIENT)
Dept: GYNECOLOGY | Facility: CLINIC | Age: 38
End: 2023-12-01
Payer: MEDICAID

## 2023-12-01 NOTE — TELEPHONE ENCOUNTER
Patient called to follow up s/p CKC on 10/5.    Attempted to contact patient at all phone numbers listed on file including alternate . First number and alternate contact number are disconnected. No answer at second listed number.    On chart review, patient has attended other visit with ID provider on 11/6 where they mentioned that patient was fully recovered and feeling well 4 weeks post-op. Message sent to provider to inquire if they can let patient know to follow up with [facility].    [facility] Path:  1. Cervix, cone biopsy: High-grade squamous intraepithelial lesion (HSIL) extending into endocervical glands.   - The surgical margins are negative for squamous intraepithelial lesion/dysplasia.    2. Endocervix, endocervical currettings:    - Fragment of blood clot.    - No endocervical tissue present for evaluation.   Plan: 6 month f/u HPV based co-testing    Discussed with Dr. Lyle.    Karan Byers MD  [employer], PGY-2

## 2024-03-05 ENCOUNTER — TELEPHONE (OUTPATIENT)
Dept: INFECTIOUS DISEASES | Facility: CLINIC | Age: 39
End: 2024-03-05
Payer: MEDICAID

## 2024-03-05 DIAGNOSIS — E55.9 VITAMIN D DEFICIENCY: ICD-10-CM

## 2024-03-05 DIAGNOSIS — D64.9 ANEMIA, UNSPECIFIED TYPE: ICD-10-CM

## 2024-03-05 RX ORDER — ERGOCALCIFEROL 1.25 MG/1
50000 CAPSULE ORAL DAILY
Qty: 12 CAPSULE | Refills: 3 | Status: SHIPPED | OUTPATIENT
Start: 2024-03-05 | End: 2025-03-05

## 2024-03-05 RX ORDER — FERROUS SULFATE 325(65) MG
325 TABLET ORAL DAILY
Qty: 90 TABLET | Refills: 3 | Status: SHIPPED | OUTPATIENT
Start: 2024-03-05

## 2024-03-05 NOTE — TELEPHONE ENCOUNTER
Last visit with Ashley Quiroz, APRN: 11/6/2023  Last visit in [facility] INFECTIOUS DISEASE: 11/6/2023    Patient's next visit in [facility] INFECTIOUS DISEASE: 3/6/2024     LL 11/06/2023    Please advise on medication refills

## 2024-03-06 ENCOUNTER — OFFICE VISIT (OUTPATIENT)
Dept: INFECTIOUS DISEASES | Facility: CLINIC | Age: 39
End: 2024-03-06
Payer: MEDICAID

## 2024-03-06 VITALS
DIASTOLIC BLOOD PRESSURE: 73 MMHG | TEMPERATURE: 98 F | HEIGHT: 62 IN | HEART RATE: 71 BPM | SYSTOLIC BLOOD PRESSURE: 106 MMHG | WEIGHT: 114.81 LBS | BODY MASS INDEX: 21.13 KG/M2 | RESPIRATION RATE: 16 BRPM

## 2024-03-06 DIAGNOSIS — B20 HIV DISEASE: Primary | ICD-10-CM

## 2024-03-06 DIAGNOSIS — R30.0 DYSURIA: ICD-10-CM

## 2024-03-06 LAB
ALBUMIN SERPL-MCNC: 4.3 G/DL (ref 3.5–5)
ALBUMIN/GLOB SERPL: 1.4 RATIO (ref 1.1–2)
ALP SERPL-CCNC: 57 UNIT/L (ref 40–150)
ALT SERPL-CCNC: 12 UNIT/L (ref 0–55)
APPEARANCE UR: CLEAR
AST SERPL-CCNC: 14 UNIT/L (ref 5–34)
BACTERIA #/AREA URNS AUTO: ABNORMAL /HPF
BASOPHILS # BLD AUTO: 0.02 X10(3)/MCL
BASOPHILS NFR BLD AUTO: 0.4 %
BILIRUB SERPL-MCNC: 0.3 MG/DL
BILIRUB UR QL STRIP.AUTO: NEGATIVE
BUN SERPL-MCNC: 9.7 MG/DL (ref 7–18.7)
CALCIUM SERPL-MCNC: 8.8 MG/DL (ref 8.4–10.2)
CHLORIDE SERPL-SCNC: 106 MMOL/L (ref 98–107)
CHOLEST SERPL-MCNC: 182 MG/DL
CHOLEST/HDLC SERPL: 3 {RATIO} (ref 0–5)
CO2 SERPL-SCNC: 25 MMOL/L (ref 22–29)
COLOR UR AUTO: YELLOW
CREAT SERPL-MCNC: 1.06 MG/DL (ref 0.55–1.02)
DEPRECATED CALCIDIOL+CALCIFEROL SERPL-MC: 33.7 NG/ML (ref 30–80)
EOSINOPHIL # BLD AUTO: 0.16 X10(3)/MCL (ref 0–0.9)
EOSINOPHIL NFR BLD AUTO: 3.3 %
ERYTHROCYTE [DISTWIDTH] IN BLOOD BY AUTOMATED COUNT: 12.3 % (ref 11.5–17)
EST. AVERAGE GLUCOSE BLD GHB EST-MCNC: 96.8 MG/DL
GFR SERPLBLD CREATININE-BSD FMLA CKD-EPI: >60 MLS/MIN/1.73/M2
GLOBULIN SER-MCNC: 3.1 GM/DL (ref 2.4–3.5)
GLUCOSE SERPL-MCNC: 80 MG/DL (ref 74–100)
GLUCOSE UR QL STRIP.AUTO: NORMAL
HAV AB SER QL IA: REACTIVE
HBA1C MFR BLD: 5 %
HCT VFR BLD AUTO: 42 % (ref 37–47)
HCV AB SERPL QL IA: NONREACTIVE
HDLC SERPL-MCNC: 59 MG/DL (ref 35–60)
HGB BLD-MCNC: 14.3 G/DL (ref 12–16)
HYALINE CASTS #/AREA URNS LPF: ABNORMAL /LPF
IMM GRANULOCYTES # BLD AUTO: 0.01 X10(3)/MCL (ref 0–0.04)
IMM GRANULOCYTES NFR BLD AUTO: 0.2 %
KETONES UR QL STRIP.AUTO: NEGATIVE
LDLC SERPL CALC-MCNC: 105 MG/DL (ref 50–140)
LEUKOCYTE ESTERASE UR QL STRIP.AUTO: 25
LYMPHOCYTES # BLD AUTO: 1.72 X10(3)/MCL (ref 0.6–4.6)
LYMPHOCYTES NFR BLD AUTO: 35.2 %
MCH RBC QN AUTO: 35.3 PG (ref 27–31)
MCHC RBC AUTO-ENTMCNC: 34 G/DL (ref 33–36)
MCV RBC AUTO: 103.7 FL (ref 80–94)
MONOCYTES # BLD AUTO: 0.34 X10(3)/MCL (ref 0.1–1.3)
MONOCYTES NFR BLD AUTO: 7 %
MUCOUS THREADS URNS QL MICRO: ABNORMAL /LPF
NEUTROPHILS # BLD AUTO: 2.64 X10(3)/MCL (ref 2.1–9.2)
NEUTROPHILS NFR BLD AUTO: 53.9 %
NITRITE UR QL STRIP.AUTO: NEGATIVE
NRBC BLD AUTO-RTO: 0 %
PH UR STRIP.AUTO: 7.5 [PH]
PLATELET # BLD AUTO: 220 X10(3)/MCL (ref 130–400)
PMV BLD AUTO: 10.5 FL (ref 7.4–10.4)
POTASSIUM SERPL-SCNC: 3.9 MMOL/L (ref 3.5–5.1)
PROT SERPL-MCNC: 7.4 GM/DL (ref 6.4–8.3)
PROT UR QL STRIP.AUTO: ABNORMAL
RBC # BLD AUTO: 4.05 X10(6)/MCL (ref 4.2–5.4)
RBC #/AREA URNS AUTO: ABNORMAL /HPF
RBC UR QL AUTO: NEGATIVE
RPR SER QL: NORMAL
RPR SER-TITR: NORMAL {TITER}
SODIUM SERPL-SCNC: 137 MMOL/L (ref 136–145)
SP GR UR STRIP.AUTO: 1.03 (ref 1–1.03)
SQUAMOUS #/AREA URNS LPF: ABNORMAL /HPF
T PALLIDUM AB SER QL: REACTIVE
TRIGL SERPL-MCNC: 92 MG/DL (ref 37–140)
TSH SERPL-ACNC: 0.42 UIU/ML (ref 0.35–4.94)
UROBILINOGEN UR STRIP-ACNC: ABNORMAL
VLDLC SERPL CALC-MCNC: 18 MG/DL
WBC # SPEC AUTO: 4.89 X10(3)/MCL (ref 4.5–11.5)
WBC #/AREA URNS AUTO: ABNORMAL /HPF

## 2024-03-06 PROCEDURE — 86803 HEPATITIS C AB TEST: CPT | Performed by: NURSE PRACTITIONER

## 2024-03-06 PROCEDURE — 86592 SYPHILIS TEST NON-TREP QUAL: CPT | Performed by: NURSE PRACTITIONER

## 2024-03-06 PROCEDURE — 87536 HIV-1 QUANT&REVRSE TRNSCRPJ: CPT | Performed by: NURSE PRACTITIONER

## 2024-03-06 PROCEDURE — 99214 OFFICE O/P EST MOD 30 MIN: CPT | Mod: S$PBB,,, | Performed by: NURSE PRACTITIONER

## 2024-03-06 PROCEDURE — 1160F RVW MEDS BY RX/DR IN RCRD: CPT | Mod: CPTII,,, | Performed by: NURSE PRACTITIONER

## 2024-03-06 PROCEDURE — 3078F DIAST BP <80 MM HG: CPT | Mod: CPTII,,, | Performed by: NURSE PRACTITIONER

## 2024-03-06 PROCEDURE — 86708 HEPATITIS A ANTIBODY: CPT | Performed by: NURSE PRACTITIONER

## 2024-03-06 PROCEDURE — 86780 TREPONEMA PALLIDUM: CPT | Performed by: NURSE PRACTITIONER

## 2024-03-06 PROCEDURE — 81001 URINALYSIS AUTO W/SCOPE: CPT | Performed by: NURSE PRACTITIONER

## 2024-03-06 PROCEDURE — 1159F MED LIST DOCD IN RCRD: CPT | Mod: CPTII,,, | Performed by: NURSE PRACTITIONER

## 2024-03-06 PROCEDURE — 3074F SYST BP LT 130 MM HG: CPT | Mod: CPTII,,, | Performed by: NURSE PRACTITIONER

## 2024-03-06 PROCEDURE — 82306 VITAMIN D 25 HYDROXY: CPT | Performed by: NURSE PRACTITIONER

## 2024-03-06 PROCEDURE — 80053 COMPREHEN METABOLIC PANEL: CPT | Performed by: NURSE PRACTITIONER

## 2024-03-06 PROCEDURE — 84443 ASSAY THYROID STIM HORMONE: CPT | Performed by: NURSE PRACTITIONER

## 2024-03-06 PROCEDURE — 86480 TB TEST CELL IMMUN MEASURE: CPT | Performed by: NURSE PRACTITIONER

## 2024-03-06 PROCEDURE — 83036 HEMOGLOBIN GLYCOSYLATED A1C: CPT | Performed by: NURSE PRACTITIONER

## 2024-03-06 PROCEDURE — 36415 COLL VENOUS BLD VENIPUNCTURE: CPT | Performed by: NURSE PRACTITIONER

## 2024-03-06 PROCEDURE — 3008F BODY MASS INDEX DOCD: CPT | Mod: CPTII,,, | Performed by: NURSE PRACTITIONER

## 2024-03-06 PROCEDURE — 86360 T CELL ABSOLUTE COUNT/RATIO: CPT | Performed by: NURSE PRACTITIONER

## 2024-03-06 PROCEDURE — 99214 OFFICE O/P EST MOD 30 MIN: CPT | Mod: PBBFAC | Performed by: NURSE PRACTITIONER

## 2024-03-06 PROCEDURE — 80061 LIPID PANEL: CPT | Performed by: NURSE PRACTITIONER

## 2024-03-06 PROCEDURE — 85025 COMPLETE CBC W/AUTO DIFF WBC: CPT | Performed by: NURSE PRACTITIONER

## 2024-03-06 RX ORDER — ABACAVIR SULFATE, DOLUTEGRAVIR SODIUM, LAMIVUDINE 600; 50; 300 MG/1; MG/1; MG/1
1 TABLET, FILM COATED ORAL DAILY
Qty: 90 TABLET | Refills: 1 | Status: SHIPPED | OUTPATIENT
Start: 2024-03-06
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
## 2024-03-07 LAB
CD3 CELLS # BLD: 955 CELLS/MCL (ref 550–2202)
CD3 CELLS NFR BLD: 76 % (ref 58–86)
CD3+CD4+ CELLS # BLD: 478 CELLS/MCL (ref 365–1437)
CD3+CD4+ CELLS NFR BLD: 38 % (ref 32–64)
CD3+CD4+ CELLS/CD3+CD8+ CLL BLD: 1 %
CD3+CD8+ CELLS # BLD: 473 CELLS/MCL (ref 171–846)
CD3+CD8+ CELLS NFR BLD: 37 % (ref 15–40)
CD45 CELLS # BLD: 1.27 THOU/MCL (ref 0.82–2.84)
HIV1 RNA # PLAS NAA DL=20: NORMAL COPIES/ML

## 2024-03-08 LAB
GAMMA INTERFERON BACKGROUND BLD IA-ACNC: 0.04 IU/ML
M TB IFN-G BLD-IMP: NEGATIVE
M TB IFN-G CD4+ BCKGRND COR BLD-ACNC: 0.01 IU/ML
M TB IFN-G CD4+CD8+ BCKGRND COR BLD-ACNC: -0.01 IU/ML
MITOGEN IGNF BCKGRD COR BLD-ACNC: 6.21 IU/ML

## 2024-03-11 LAB — BEAKER SEE SCANNED REPORT: NORMAL

## 2024-07-10 ENCOUNTER — OFFICE VISIT (OUTPATIENT)
Dept: INFECTIOUS DISEASES | Facility: CLINIC | Age: 39
End: 2024-07-10
Payer: MEDICAID

## 2024-07-10 ENCOUNTER — LAB VISIT (OUTPATIENT)
Dept: LAB | Facility: HOSPITAL | Age: 39
End: 2024-07-10
Attending: NURSE PRACTITIONER
Payer: MEDICAID

## 2024-07-10 VITALS
HEART RATE: 72 BPM | RESPIRATION RATE: 12 BRPM | BODY MASS INDEX: 19.82 KG/M2 | DIASTOLIC BLOOD PRESSURE: 75 MMHG | HEIGHT: 62 IN | WEIGHT: 107.69 LBS | SYSTOLIC BLOOD PRESSURE: 114 MMHG | TEMPERATURE: 98 F

## 2024-07-10 DIAGNOSIS — Z11.3 ROUTINE SCREENING FOR STI (SEXUALLY TRANSMITTED INFECTION): ICD-10-CM

## 2024-07-10 DIAGNOSIS — B00.1 HERPES LABIALIS: ICD-10-CM

## 2024-07-10 DIAGNOSIS — J02.9 SORE THROAT: ICD-10-CM

## 2024-07-10 DIAGNOSIS — B20 HIV DISEASE: Primary | ICD-10-CM

## 2024-07-10 DIAGNOSIS — B20 HIV DISEASE: ICD-10-CM

## 2024-07-10 LAB
ALBUMIN SERPL-MCNC: 4.4 G/DL (ref 3.5–5)
ALBUMIN/GLOB SERPL: 1.4 RATIO (ref 1.1–2)
ALP SERPL-CCNC: 59 UNIT/L (ref 40–150)
ALT SERPL-CCNC: 10 UNIT/L (ref 0–55)
ANION GAP SERPL CALC-SCNC: 8 MEQ/L
AST SERPL-CCNC: 13 UNIT/L (ref 5–34)
BACTERIA #/AREA URNS AUTO: ABNORMAL /HPF
BASOPHILS # BLD AUTO: 0.02 X10(3)/MCL
BASOPHILS NFR BLD AUTO: 0.2 %
BILIRUB SERPL-MCNC: 0.3 MG/DL
BILIRUB UR QL STRIP.AUTO: NEGATIVE
BUN SERPL-MCNC: 9.5 MG/DL (ref 7–18.7)
C TRACH DNA SPEC QL NAA+PROBE: NOT DETECTED
CALCIUM SERPL-MCNC: 9.9 MG/DL (ref 8.4–10.2)
CHLORIDE SERPL-SCNC: 108 MMOL/L (ref 98–107)
CLARITY UR: CLEAR
CO2 SERPL-SCNC: 25 MMOL/L (ref 22–29)
COLOR UR AUTO: ABNORMAL
CREAT SERPL-MCNC: 1.15 MG/DL (ref 0.55–1.02)
CREAT/UREA NIT SERPL: 8
EOSINOPHIL # BLD AUTO: 0.15 X10(3)/MCL (ref 0–0.9)
EOSINOPHIL NFR BLD AUTO: 1.8 %
ERYTHROCYTE [DISTWIDTH] IN BLOOD BY AUTOMATED COUNT: 12.5 % (ref 11.5–17)
GFR SERPLBLD CREATININE-BSD FMLA CKD-EPI: >60 ML/MIN/1.73/M2
GLOBULIN SER-MCNC: 3.2 GM/DL (ref 2.4–3.5)
GLUCOSE SERPL-MCNC: 91 MG/DL (ref 74–100)
GLUCOSE UR QL STRIP: NORMAL
HBV SURFACE AB SER-ACNC: 118.38 MIU/ML
HBV SURFACE AB SERPL IA-ACNC: REACTIVE M[IU]/ML
HCT VFR BLD AUTO: 45.1 % (ref 37–47)
HGB BLD-MCNC: 14.9 G/DL (ref 12–16)
HGB UR QL STRIP: NEGATIVE
HYALINE CASTS #/AREA URNS LPF: ABNORMAL /LPF
IMM GRANULOCYTES # BLD AUTO: 0.02 X10(3)/MCL (ref 0–0.04)
IMM GRANULOCYTES NFR BLD AUTO: 0.2 %
KETONES UR QL STRIP: NEGATIVE
LEUKOCYTE ESTERASE UR QL STRIP: NEGATIVE
LYMPHOCYTES # BLD AUTO: 1.67 X10(3)/MCL (ref 0.6–4.6)
LYMPHOCYTES NFR BLD AUTO: 19.6 %
MCH RBC QN AUTO: 34.7 PG (ref 27–31)
MCHC RBC AUTO-ENTMCNC: 33 G/DL (ref 33–36)
MCV RBC AUTO: 105.1 FL (ref 80–94)
MONOCYTES # BLD AUTO: 0.46 X10(3)/MCL (ref 0.1–1.3)
MONOCYTES NFR BLD AUTO: 5.4 %
MUCOUS THREADS URNS QL MICRO: ABNORMAL /LPF
N GONORRHOEA DNA SPEC QL NAA+PROBE: NOT DETECTED
NEUTROPHILS # BLD AUTO: 6.21 X10(3)/MCL (ref 2.1–9.2)
NEUTROPHILS NFR BLD AUTO: 72.8 %
NITRITE UR QL STRIP: NEGATIVE
NRBC BLD AUTO-RTO: 0 %
PH UR STRIP: 6.5 [PH]
PLATELET # BLD AUTO: 228 X10(3)/MCL (ref 130–400)
PMV BLD AUTO: 10.7 FL (ref 7.4–10.4)
POTASSIUM SERPL-SCNC: 4.4 MMOL/L (ref 3.5–5.1)
PROT SERPL-MCNC: 7.6 GM/DL (ref 6.4–8.3)
PROT UR QL STRIP: NEGATIVE
RBC # BLD AUTO: 4.29 X10(6)/MCL (ref 4.2–5.4)
RBC #/AREA URNS AUTO: ABNORMAL /HPF
RPR SER QL: NORMAL
SODIUM SERPL-SCNC: 141 MMOL/L (ref 136–145)
SOURCE (OHS): NORMAL
SP GR UR STRIP.AUTO: 1.02 (ref 1–1.03)
SQUAMOUS #/AREA URNS LPF: ABNORMAL /HPF
STREP A PCR (OHS): NOT DETECTED
T PALLIDUM AB SER QL: REACTIVE
UROBILINOGEN UR STRIP-ACNC: NORMAL
WBC # BLD AUTO: 8.53 X10(3)/MCL (ref 4.5–11.5)
WBC #/AREA URNS AUTO: ABNORMAL /HPF

## 2024-07-10 PROCEDURE — 86592 SYPHILIS TEST NON-TREP QUAL: CPT

## 2024-07-10 PROCEDURE — 86706 HEP B SURFACE ANTIBODY: CPT

## 2024-07-10 PROCEDURE — 1159F MED LIST DOCD IN RCRD: CPT | Mod: CPTII,,, | Performed by: NURSE PRACTITIONER

## 2024-07-10 PROCEDURE — 99213 OFFICE O/P EST LOW 20 MIN: CPT | Mod: PBBFAC | Performed by: NURSE PRACTITIONER

## 2024-07-10 PROCEDURE — 3044F HG A1C LEVEL LT 7.0%: CPT | Mod: CPTII,,, | Performed by: NURSE PRACTITIONER

## 2024-07-10 PROCEDURE — 80053 COMPREHEN METABOLIC PANEL: CPT

## 2024-07-10 PROCEDURE — 86780 TREPONEMA PALLIDUM: CPT

## 2024-07-10 PROCEDURE — 87591 N.GONORRHOEAE DNA AMP PROB: CPT | Performed by: NURSE PRACTITIONER

## 2024-07-10 PROCEDURE — 81001 URINALYSIS AUTO W/SCOPE: CPT | Performed by: NURSE PRACTITIONER

## 2024-07-10 PROCEDURE — 36415 COLL VENOUS BLD VENIPUNCTURE: CPT

## 2024-07-10 PROCEDURE — 87536 HIV-1 QUANT&REVRSE TRNSCRPJ: CPT

## 2024-07-10 PROCEDURE — 85025 COMPLETE CBC W/AUTO DIFF WBC: CPT

## 2024-07-10 PROCEDURE — 87491 CHLMYD TRACH DNA AMP PROBE: CPT | Performed by: NURSE PRACTITIONER

## 2024-07-10 PROCEDURE — 3008F BODY MASS INDEX DOCD: CPT | Mod: CPTII,,, | Performed by: NURSE PRACTITIONER

## 2024-07-10 PROCEDURE — 86360 T CELL ABSOLUTE COUNT/RATIO: CPT

## 2024-07-10 PROCEDURE — 3074F SYST BP LT 130 MM HG: CPT | Mod: CPTII,,, | Performed by: NURSE PRACTITIONER

## 2024-07-10 PROCEDURE — 1160F RVW MEDS BY RX/DR IN RCRD: CPT | Mod: CPTII,,, | Performed by: NURSE PRACTITIONER

## 2024-07-10 PROCEDURE — 99214 OFFICE O/P EST MOD 30 MIN: CPT | Mod: S$PBB,,, | Performed by: NURSE PRACTITIONER

## 2024-07-10 PROCEDURE — 87651 STREP A DNA AMP PROBE: CPT | Performed by: NURSE PRACTITIONER

## 2024-07-10 PROCEDURE — 3078F DIAST BP <80 MM HG: CPT | Mod: CPTII,,, | Performed by: NURSE PRACTITIONER

## 2024-07-10 PROCEDURE — 81015 MICROSCOPIC EXAM OF URINE: CPT | Performed by: NURSE PRACTITIONER

## 2024-07-10 RX ORDER — ABACAVIR SULFATE, DOLUTEGRAVIR SODIUM, LAMIVUDINE 600; 50; 300 MG/1; MG/1; MG/1
1 TABLET, FILM COATED ORAL DAILY
Qty: 90 TABLET | Refills: 1 | Status: SHIPPED | OUTPATIENT
Start: 2024-07-10

## 2024-07-10 RX ORDER — VALACYCLOVIR HYDROCHLORIDE 1 G/1
1000 TABLET, FILM COATED ORAL DAILY
Qty: 20 TABLET | Refills: 1 | Status: SHIPPED | OUTPATIENT
Start: 2024-07-10 | End: 2024-07-15

## 2024-07-10 NOTE — PROGRESS NOTES
"Patient ID: Gayatri Márquez 38 y.o.     Chief Complaint:   Chief Complaint   Patient presents with    Followup HIV     States "sore" in nose again today, slight sore throat and kind of chilly today        HPI:    7/10/24  Gayatri is a 37 yo WF here today for HIV f/u visit.  She is virally suppressed on Triumeq daily and tolerates it well. Labs 3/24 VL UD, CD4 478, RPR NR.  She has not had any recent HSV outbreaks, but would like to have Valtrex on hand for PRN use.  Refills authorized. She has not yet scheduled eye exam, agrees to do so. GYN appt has been rescheduled and she does plan to attend as scheduled. Today, she tells me that she has been having a sore throat and tender lesion to right nares x 5 days. Denies fever or sinus tenderness. She has a mild cough, non-productive. Has not tried any OTC medications to avoid DDI.  She has not had any new sexual partners, but is not sure if male partner is faithful. Appreciates STI screenings today. All questions answered & concerns addressed.    3/6/24  Gayatri is a 37 yo WF presenting today for HIV f/u visit.  She takes Triumeq every day and is virally suppressed. Labs 11/23 VL UD, RPR NR, urine & oral swab negative for ct/gc, 8/23 Cd4 434.  She is due for repeat cervical pap, will go to GYN clinic today to reschedule missed appts. Last ophth visit 3/21, has noted increased sensitivity to light. Will go to [facility] for full examination. Valtrex cleared up lip lesions in a couple of days after last visit. Today, she tells me that she is feeling pressure before & after urination, urgency as well.  Denies burning or odor noted. Will assess urine for UTI. Voiced appreciation. She is accompanied by 17 yo son who is aware of her diagnosis & verbal permission was given to discuss results & medications with him present.      11/6/23   Gayatri is a 37 yo WF here today for HIV f/u visit.  She is taking Triumeq daily & is tolerating it well.  Labs 8/23 VL UD, CD4 434.  She " underwent CKC 10/5/23 and is fully recovered, feeling much better.  Pathology resulted HGSIL with clear margins, pt voiced appreciation for results.  She tells me that she did go to [place] with her mother & stayed for a week, but her boyfriend convinced her to come back home & relationship has been much better. She has not had any new partners, but appreciates STI screenings today. Last RPR NR 3/23, CT/GC negative 4/23.  She denies anal intercourse, does engage in oral. Will screen oral for ct/gc as well.  She does have a cluster of serous blistering lesions to left lip, using mupirocin ointment but not effective. Lesion burns and is uncomfortable, present x 2 weeks. Not currently taking Valtrex but does have at home. Will resume today. States that partner did attend visit with PCP ESTEVAN Pack & should have undergone testing but she does not attend visits with him. Amenable to flu vaccine today. All questions answered & concerns addressed.           Past Medical History:   Diagnosis Date    H/O syphilis     Herpes simplex virus (HSV) infection     Valtrex PRN    Human immunodeficiency virus (HIV) disease     On triumeq, VL undetectable 8/2023        Past Surgical History:   Procedure Laterality Date    COLD KNIFE CONIZATION OF CERVIX N/A 10/5/2023    Procedure: CONE BIOPSY, CERVIX, USING COLD KNIFE;  Surgeon: Shara Lyle MD;  Location: [facility];  Service: OB/GYN;  Laterality: N/A;    COLPOSCOPY, WITH BIOPSY OF CERVIX  07/28/2023    NEPHRECTOMY  05/19/2014    right    VASCULAR SURGERY          Social History     Socioeconomic History    Marital status: Significant Other    Number of children: 2   Tobacco Use    Smoking status: Every Day     Types: Vaping with nicotine    Smokeless tobacco: Current    Tobacco comments:     Stopped smoking cigarettes x 2 weeks. Vaping for now   Substance and Sexual Activity    Alcohol use: Not Currently    Drug use: Yes     Frequency: 7.0 times per week     Types: Marijuana    Sexual  activity: Yes     Partners: Male     Birth control/protection: None     Comment: Monogomous     Social Determinants of Health     Financial Resource Strain: Low Risk  (11/30/2022)    Overall Financial Resource Strain (CARDIA)     Difficulty of Paying Living Expenses: Not hard at all   Food Insecurity: No Food Insecurity (11/30/2022)    Hunger Vital Sign     Worried About Running Out of Food in the Last Year: Never true     Ran Out of Food in the Last Year: Never true   Transportation Needs: No Transportation Needs (11/30/2022)    PRAPARE - Transportation     Lack of Transportation (Medical): No     Lack of Transportation (Non-Medical): No   Physical Activity: Inactive (11/30/2022)    Exercise Vital Sign     Days of Exercise per Week: 0 days     Minutes of Exercise per Session: 0 min   Stress: Stress Concern Present (11/30/2022)    [employer] - Occupational Stress Questionnaire     Feeling of Stress : To some extent   Housing Stability: Low Risk  (11/30/2022)    Housing Stability Vital Sign     Unable to Pay for Housing in the Last Year: No     Number of Places Lived in the Last Year: 1     Unstable Housing in the Last Year: No        Family History   Problem Relation Name Age of Onset    Arthritis Mother      Depression Mother      Hyperlipidemia Mother      Migraines Mother      Heart disease Father      Depression Maternal Grandmother      Diabetes Maternal Grandmother      Hypertension Maternal Grandmother      Coronary artery disease Paternal Grandmother      Diabetes Paternal Grandmother      Cardiomyopathy Paternal Grandfather      COPD Paternal Grandfather      Kidney disease Paternal Grandfather          Review of patient's allergies indicates:   Allergen Reactions    Sulfamethoxazole-trimethoprim Hives        Immunization History   Administered Date(s) Administered    HPV 9-Valent 08/12/2019, 10/09/2019, 10/14/2020    Hepatitis B 03/23/1998, 08/05/1998    Hepatitis B,  "Pediatric/Adolescent 10/06/1998    Influenza - Quadrivalent 10/14/2020, 10/11/2021    Influenza - Quadrivalent - PF *Preferred* (6 months and older) 10/09/2019, 10/14/2020, 10/11/2021, 11/30/2022, 11/06/2023    Meningococcal Conjugate (MCV4P) 12/09/2019, 01/22/2021    OPV 03/23/1998    PPD Test 06/14/2001    Pneumococcal Conjugate - 13 Valent 08/12/2019    Pneumococcal Polysaccharide - 23 Valent 12/09/2019    Td (ADULT) 03/23/1998, 08/05/1998    Tdap 03/24/2009, 11/05/2020        Review of Systems   Constitutional: Negative.    HENT: Negative.     Eyes: Negative.    Respiratory: Negative.     Cardiovascular: Negative.    Gastrointestinal: Negative.    Genitourinary: Negative.    Musculoskeletal: Negative.    Skin: Negative.    Neurological: Negative.    Endo/Heme/Allergies: Negative.    Psychiatric/Behavioral: Negative.     All other systems reviewed and are negative.         Objective:      /75 (BP Location: Left arm, Patient Position: Sitting, BP Method: Medium (Automatic))   Pulse 72   Temp 98 °F (36.7 °C) (Oral)   Resp 12   Ht 5' 2" (1.575 m)   Wt 48.9 kg (107 lb 11.1 oz)   BMI 19.70 kg/m²      Physical Exam  Vitals reviewed.   Constitutional:       General: She is not in acute distress.     Appearance: Normal appearance. She is not toxic-appearing.   HENT:      Nose:      Comments: Dry, crusting scab to right nares.      Mouth/Throat:      Mouth: Mucous membranes are moist.      Pharynx: Oropharynx is clear. Posterior oropharyngeal erythema present.   Eyes:      Conjunctiva/sclera: Conjunctivae normal.   Cardiovascular:      Rate and Rhythm: Normal rate and regular rhythm.   Pulmonary:      Effort: Pulmonary effort is normal. No respiratory distress.      Breath sounds: Normal breath sounds.   Abdominal:      General: Abdomen is flat. Bowel sounds are normal.      Palpations: Abdomen is soft.   Musculoskeletal:         General: Normal range of motion.      Cervical back: Normal range of motion. "   Lymphadenopathy:      Cervical: No cervical adenopathy.   Skin:     General: Skin is warm and dry.   Neurological:      General: No focal deficit present.      Mental Status: She is alert and oriented to person, place, and time. Mental status is at baseline.   Psychiatric:         Mood and Affect: Mood normal.         Behavior: Behavior normal.          Labs:   Lab Results   Component Value Date    WBC 8.53 07/10/2024    HGB 14.9 07/10/2024    HCT 45.1 07/10/2024    .1 (H) 07/10/2024     07/10/2024       CMP  Sodium   Date Value Ref Range Status   07/10/2024 141 136 - 145 mmol/L Final     Potassium   Date Value Ref Range Status   07/10/2024 4.4 3.5 - 5.1 mmol/L Final     Chloride   Date Value Ref Range Status   07/10/2024 108 (H) 98 - 107 mmol/L Final     CO2   Date Value Ref Range Status   07/10/2024 25 22 - 29 mmol/L Final     Blood Urea Nitrogen   Date Value Ref Range Status   07/10/2024 9.5 7.0 - 18.7 mg/dL Final     Creatinine   Date Value Ref Range Status   07/10/2024 1.15 (H) 0.55 - 1.02 mg/dL Final     Calcium   Date Value Ref Range Status   07/10/2024 9.9 8.4 - 10.2 mg/dL Final     Albumin   Date Value Ref Range Status   07/10/2024 4.4 3.5 - 5.0 g/dL Final     Bilirubin Total   Date Value Ref Range Status   07/10/2024 0.3 <=1.5 mg/dL Final     ALP   Date Value Ref Range Status   07/10/2024 59 40 - 150 unit/L Final     AST   Date Value Ref Range Status   07/10/2024 13 5 - 34 unit/L Final     ALT   Date Value Ref Range Status   07/10/2024 10 0 - 55 unit/L Final     eGFR   Date Value Ref Range Status   07/10/2024 >60 mL/min/1.73/m2 Final     Lab Results   Component Value Date    TSH 0.423 03/06/2024     Hep C Ab Interp   Date Value Ref Range Status   03/06/2024 Nonreactive Nonreactive Final     Syphilis Antibody   Date Value Ref Range Status   07/10/2024 Reactive (A) Nonreactive, Equivocal Final     RPR   Date Value Ref Range Status   07/10/2024 Non-Reactive Non-Reactive Final     Cholesterol  Total   Date Value Ref Range Status   03/06/2024 182 <=200 mg/dL Final     HDL Cholesterol   Date Value Ref Range Status   03/06/2024 59 35 - 60 mg/dL Final     Triglyceride   Date Value Ref Range Status   03/06/2024 92 37 - 140 mg/dL Final     Cholesterol/HDL Ratio   Date Value Ref Range Status   03/06/2024 3 0 - 5 Final     Very Low Density Lipoprotein   Date Value Ref Range Status   03/06/2024 18  Final     LDL Cholesterol   Date Value Ref Range Status   03/06/2024 105.00 50.00 - 140.00 mg/dL Final     Vitamin D   Date Value Ref Range Status   03/06/2024 33.7 30.0 - 80.0 ng/mL Final     Results for orders placed or performed in visit on 03/07/23   CD4 Lymphocytes   Result Value Ref Range    Patient Age 37     WBC Absolute 5,500 4,500 - 11,500 /mm3    Lymph Percent 29 28 - 48 %    Lymph Absolute 1,595 1,260 - 5,520 x10(3)/mcL    CD4 % 41.3 %    CD4 Absolute 658.735 589 - 1,505 unit/L    T Cell Interp       Normal absolute lymphocyte count with normal absolute CD4+ lymphocyte count.  Riki Thompson MD       Results for orders placed or performed in visit on 03/06/24   HIV-1 RNA, Quantitative, PCR with Reflex to Genotype   Result Value Ref Range    HIV-1 RNA Detect/Quant, P Undetected Undetected copies/mL     Results for orders placed or performed in visit on 03/06/24   Quantiferon Gold TB   Result Value Ref Range    QuantiFERON-Tb Gold Plus Result Negative Negative    TB1 Ag minus Nil Result 0.01 IU/mL    TB2 Ag minus Nil Result -0.01 IU/mL    Mitogen minus Nil Result 6.21 IU/mL    Nil Result 0.04 IU/mL     Results for orders placed or performed in visit on 11/06/23   C.trach/N.gonor AMP RNA    Specimen: Throat   Result Value Ref Range    Chlamydia trachomatis amplified RNA Negative Negative    Neisseria gonorrhoeae amplified RNA Negative Negative    Source THROAT     SOURCE: Throat      Results for orders placed or performed in visit on 03/06/24   Urinalysis   Result Value Ref Range    Color, UA Yellow Yellow,  Light-Yellow, Dark Yellow, Dianne, Straw    Appearance, UA Clear Clear    Specific Gravity, UA 1.029 1.005 - 1.030    pH, UA 7.5 5.0 - 8.5    Protein, UA 1+ (A) Negative    Glucose, UA Normal Negative, Normal    Ketones, UA Negative Negative    Blood, UA Negative Negative    Bilirubin, UA Negative Negative    Urobilinogen, UA 1+ (A) 0.2, 1.0, Normal    Nitrites, UA Negative Negative    Leukocyte Esterase, UA 25 (A) Negative    WBC, UA 0-5 None Seen, 0-2, 3-5, 0-5 /HPF    Bacteria, UA Trace (A) None Seen /HPF    Squamous Epithelial Cells, UA Many (A) None Seen /HPF    Mucous, UA Occ (A) None Seen /LPF    Hyaline Casts, UA None Seen None Seen /lpf    RBC, UA 0-5 None Seen, 0-2, 3-5, 0-5 /HPF       Imaging: Reviewed most recent relevant imaging studies available, notable results highlighted in this note    Medications:     Current Outpatient Medications   Medication Instructions    abacavir-dolutegravir-lamivud (TRIUMEQ) 600- mg Tab 1 tablet, Oral, Daily    ascorbic acid (vitamin C) (VITAMIN C) 500 mg, Oral, Daily    buPROPion (WELLBUTRIN XL) 150 mg    ergocalciferol (VITAMIN D2) 50,000 Units, Oral, Daily    ferrous sulfate (FEROSUL) 325 mg, Oral, Daily    mupirocin (BACTROBAN) 2 % ointment Topical (Top), 3 times daily    valACYclovir (VALTREX) 1,000 mg, Oral, Daily       Assessment:       Problem List Items Addressed This Visit    None  Visit Diagnoses       HIV disease    -  Primary    Relevant Medications    abacavir-dolutegravir-lamivud (TRIUMEQ) 600- mg Tab    Other Relevant Orders    HIV-1 RNA, Quantitative, PCR with Reflex to Genotype    CD4 T-[place] Cells    CBC Auto Differential (Completed)    Comprehensive Metabolic Panel (Completed)    Hepatitis B Surface Ab, Qualitative (Completed)    Urinalysis (Completed)    Herpes labialis        Relevant Medications    valACYclovir (VALTREX) 1000 MG tablet    Routine screening for STI (sexually transmitted infection)        Relevant Orders    Chlamydia/GC,  PCR (Completed)    SYPHILIS ANTIBODY (WITH REFLEX RPR) (Completed)    Sore throat        Relevant Orders    Strep Group A by PCR (Completed)               Plan:      HIV disease  -     abacavir-dolutegravir-lamivud (TRIUMEQ) 600- mg Tab; Take 1 tablet by mouth once daily.  Dispense: 90 tablet; Refill: 1  -     HIV-1 RNA, Quantitative, PCR with Reflex to Genotype; Future; Expected date: 07/10/2024  -     CD4 T-[place] Cells; Future; Expected date: 07/10/2024  -     CBC Auto Differential; Future; Expected date: 07/10/2024  -     Comprehensive Metabolic Panel; Future; Expected date: 07/10/2024  -     Hepatitis B Surface Ab, Qualitative; Future; Expected date: 07/10/2024  -     Urinalysis  Diagnosed 6/2019.  VL [place] 8260, CD4 317.  Adherence and sexual health counseling done.  Use condoms for all sexual encounters.  Blood precautions.  Continue Triumeq as prescribed.  Labs today as ordered.  RTC 4 months with Ashley.      Wellness:  Cervical pap: colpo 2/21, 7/23 HGSIL, CKC 10/23 HGSIL  Anal pap: 10/20 Neg  Cervical CT/GC: 4/23 Neg, 11/23 Neg  Anal CT/GC: 10/20 Neg  Oral CT/GC: 10/20 Neg, 11/23 Neg  Eye exam: 3/21 (will go to [facility], 3/24)    Herpes labialis  -     valACYclovir (VALTREX) 1000 MG tablet; Take 1 tablet (1,000 mg total) by mouth once daily. for 5 days  Dispense: 20 tablet; Refill: 1  Valtrex PRN outbreaks as prescribed.     Routine screening for STI (sexually transmitted infection)  -     Chlamydia/GC, PCR  -     SYPHILIS ANTIBODY (WITH REFLEX RPR); Future; Expected date: 07/10/2024  Urine for ct/gc, RPR today.     Sore throat  -     Strep Group A by PCR; Future; Expected date: 07/10/2024  Strept swab collected.

## 2024-07-11 ENCOUNTER — TELEPHONE (OUTPATIENT)
Dept: INFECTIOUS DISEASES | Facility: CLINIC | Age: 39
End: 2024-07-11
Payer: MEDICAID

## 2024-07-11 LAB
CD3 CELLS # BLD: 1142 CELLS/MCL (ref 550–2202)
CD3 CELLS NFR BLD: 73 % (ref 58–86)
CD3+CD4+ CELLS # BLD: 593 CELLS/MCL (ref 365–1437)
CD3+CD4+ CELLS NFR BLD: 38 % (ref 32–64)
CD3+CD4+ CELLS/CD3+CD8+ CLL BLD: 1.1 %
CD3+CD8+ CELLS # BLD: 551 CELLS/MCL (ref 171–846)
CD3+CD8+ CELLS NFR BLD: 35 % (ref 15–40)
CD45 CELLS # BLD: 1.57 THOU/MCL (ref 0.82–2.84)
HIV1 RNA # PLAS NAA DL=20: NORMAL COPIES/ML

## 2024-07-11 NOTE — TELEPHONE ENCOUNTER
----- Message from Flora Palmer sent at 7/11/2024  8:47 AM CDT -----  Patient Of Ashley     Pt stated that she needed to speak her nurse(Tracey ) , contacted pt back no answer   Left vm .    07/11/2024    @8:50    Pt# [phone]

## 2024-07-11 NOTE — TELEPHONE ENCOUNTER
"Phoned patient. No answer. " I'M sorry but the person you are trying to call, Voice mail box not set up yet. Please try call again later"  "

## 2024-07-12 NOTE — TELEPHONE ENCOUNTER
Phoned patient. Requests date of diagnosis. Did inform patient that I see labs with provider Ashley Quiroz NP, starting 12/2019. Patient did mention that she was diagnosed at the [facility] and was sent to [facility] and that is was approximately 07/2019 and then saw provider not long after. Voiced understanding and appreciates call.

## 2024-07-15 LAB — T PALLIDUM AB SER QL AGGL: ABNORMAL

## 2024-11-12 ENCOUNTER — OFFICE VISIT (OUTPATIENT)
Dept: INFECTIOUS DISEASES | Facility: CLINIC | Age: 39
End: 2024-11-12
Payer: MEDICAID

## 2024-11-12 VITALS
DIASTOLIC BLOOD PRESSURE: 78 MMHG | HEIGHT: 62 IN | SYSTOLIC BLOOD PRESSURE: 110 MMHG | WEIGHT: 103.5 LBS | TEMPERATURE: 98 F | BODY MASS INDEX: 19.05 KG/M2 | HEART RATE: 86 BPM | RESPIRATION RATE: 12 BRPM

## 2024-11-12 DIAGNOSIS — Z21 ASYMPTOMATIC HIV INFECTION, WITH NO HISTORY OF HIV-RELATED ILLNESS: Primary | ICD-10-CM

## 2024-11-12 DIAGNOSIS — Z23 NEED FOR VACCINATION: ICD-10-CM

## 2024-11-12 DIAGNOSIS — Z90.5 HISTORY OF NEPHRECTOMY, RIGHT: ICD-10-CM

## 2024-11-12 PROCEDURE — 3078F DIAST BP <80 MM HG: CPT | Mod: CPTII,,, | Performed by: NURSE PRACTITIONER

## 2024-11-12 PROCEDURE — 90472 IMMUNIZATION ADMIN EACH ADD: CPT | Mod: PBBFAC

## 2024-11-12 PROCEDURE — 90471 IMMUNIZATION ADMIN: CPT | Mod: PBBFAC

## 2024-11-12 PROCEDURE — 3008F BODY MASS INDEX DOCD: CPT | Mod: CPTII,,, | Performed by: NURSE PRACTITIONER

## 2024-11-12 PROCEDURE — 1159F MED LIST DOCD IN RCRD: CPT | Mod: CPTII,,, | Performed by: NURSE PRACTITIONER

## 2024-11-12 PROCEDURE — 3074F SYST BP LT 130 MM HG: CPT | Mod: CPTII,,, | Performed by: NURSE PRACTITIONER

## 2024-11-12 PROCEDURE — 90656 IIV3 VACC NO PRSV 0.5 ML IM: CPT | Mod: PBBFAC

## 2024-11-12 PROCEDURE — 1160F RVW MEDS BY RX/DR IN RCRD: CPT | Mod: CPTII,,, | Performed by: NURSE PRACTITIONER

## 2024-11-12 PROCEDURE — 90677 PCV20 VACCINE IM: CPT | Mod: PBBFAC

## 2024-11-12 PROCEDURE — 99213 OFFICE O/P EST LOW 20 MIN: CPT | Mod: PBBFAC | Performed by: NURSE PRACTITIONER

## 2024-11-12 PROCEDURE — 3044F HG A1C LEVEL LT 7.0%: CPT | Mod: CPTII,,, | Performed by: NURSE PRACTITIONER

## 2024-11-12 PROCEDURE — 99214 OFFICE O/P EST MOD 30 MIN: CPT | Mod: S$PBB,,, | Performed by: NURSE PRACTITIONER

## 2024-11-12 RX ADMIN — PNEUMOCOCCAL 20-VALENT CONJUGATE VACCINE 0.5 ML
2.2; 2.2; 2.2; 2.2; 2.2; 2.2; 2.2; 2.2; 2.2; 2.2; 2.2; 2.2; 2.2; 2.2; 2.2; 2.2; 4.4; 2.2; 2.2; 2.2 INJECTION, SUSPENSION INTRAMUSCULAR at 02:11

## 2024-11-12 RX ADMIN — INFLUENZA VIRUS VACCINE 0.5 ML: 15; 15; 15 SUSPENSION INTRAMUSCULAR at 02:11

## 2024-11-12 NOTE — PROGRESS NOTES
Patient ID: Gayatri Márquez 39 y.o.     Chief Complaint:   Chief Complaint   Patient presents with    Followup HIV     Denies problems        HPI:    11/12/24  Gayatri is a 38 yo WM presenting today for HIV f/u visit.  She has been taking Triumeq since time of diagnosis in 2019 and tolerates it well. Labs 7/24 VL UD, CD4 593, RPR NR. She is very adherent to treatment plan and does not miss doses.  Discussed with patient the possible risk for cardiac disease with abacavir containing regimens.  Shared decision making, agrees to switch to Dovato. Baseline VL <9000. Will notify me for any concerns. Will repeat VL today & in 6-8 weeks. She did have cervical pap a couple of months ago, will request records from [facility] office. She is due for flu & pneumonia vaccinations, amenable to both today. She states that she has left her ex-boyfriend and is at peace. 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 All questions answered & concerns addressed.    7/10/24  Gayatri is a 39 yo WF here today for HIV f/u visit.  She is virally suppressed on Triumeq daily and tolerates it well. Labs 3/24 VL UD, CD4 478, RPR NR.  She has not had any recent HSV outbreaks, but would like to have Valtrex on hand for PRN use.  Refills authorized. She has not yet scheduled eye exam, agrees to do so. GYN appt has been rescheduled and she does plan to attend as scheduled. Today, she tells me that she has been having a sore throat and tender lesion to right nares x 5 days. Denies fever or sinus tenderness. She has a mild cough, non-productive. Has not tried any OTC medications to avoid DDI.  She has not had any new sexual partners, but is not sure if male partner is faithful. Appreciates STI screenings today. All questions answered & concerns addressed.     3/6/24  Gayatri is a 39 yo WF presenting today for HIV f/u visit.  She takes Triumeq every day and is virally suppressed. Labs 11/23 VL UD, RPR NR, urine & oral swab  negative for ct/gc, 8/23 Cd4 434.  She is due for repeat cervical pap, will go to GYN clinic today to reschedule missed appts. Last ophth visit 3/21, has noted increased sensitivity to light. Will go to [facility] for full examination. Valtrex cleared up lip lesions in a couple of days after last visit. Today, she tells me that she is feeling pressure before & after urination, urgency as well.  Denies burning or odor noted. Will assess urine for UTI. Voiced appreciation. She is accompanied by 15 yo son who is aware of her diagnosis & verbal permission was given to discuss results & medications with him present.            Past Medical History:   Diagnosis Date    Abnormal Pap smear of cervix     H/O syphilis     Herpes simplex virus (HSV) infection     Valtrex PRN    Human immunodeficiency virus (HIV) disease     On triumeq, VL undetectable 8/2023    Insomnia     Vitamin D deficiency         Past Surgical History:   Procedure Laterality Date    COLD KNIFE CONIZATION OF CERVIX N/A 10/5/2023    Procedure: CONE BIOPSY, CERVIX, USING COLD KNIFE;  Surgeon: Shara Lyle MD;  Location: [facility];  Service: OB/GYN;  Laterality: N/A;    COLPOSCOPY, WITH BIOPSY OF CERVIX  07/28/2023    NEPHRECTOMY  05/19/2014    right    VASCULAR SURGERY          Social History     Socioeconomic History    Marital status: Significant Other    Number of children: 2   Tobacco Use    Smoking status: Every Day     Types: Vaping with nicotine    Smokeless tobacco: Current    Tobacco comments:     Stopped smoking cigarettes x 2 weeks. Vaping for now   Substance and Sexual Activity    Alcohol use: Not Currently     Comment: Denies    Drug use: Yes     Frequency: 7.0 times per week     Types: Marijuana    Sexual activity: Not Currently     Partners: Male     Birth control/protection: None, Condom     Comment: single     Social Drivers of Health     Financial Resource Strain: Low Risk  (11/30/2022)    Overall Financial Resource Strain ([facility])      Difficulty of Paying Living Expenses: Not hard at all   Food Insecurity: No Food Insecurity (11/30/2022)    Hunger Vital Sign     Worried About Running Out of Food in the Last Year: Never true     Ran Out of Food in the Last Year: Never true   Transportation Needs: No Transportation Needs (11/30/2022)    PRAPARE - Transportation     Lack of Transportation (Medical): No     Lack of Transportation (Non-Medical): No   Physical Activity: Inactive (11/30/2022)    Exercise Vital Sign     Days of Exercise per Week: 0 days     Minutes of Exercise per Session: 0 min   Stress: Stress Concern Present (11/30/2022)    [employer] - Occupational Stress Questionnaire     Feeling of Stress : To some extent   Housing Stability: Low Risk  (11/30/2022)    Housing Stability Vital Sign     Unable to Pay for Housing in the Last Year: No     Number of Places Lived in the Last Year: 1     Unstable Housing in the Last Year: No        Family History   Problem Relation Name Age of Onset    Arthritis Mother      Depression Mother      Hyperlipidemia Mother      Migraines Mother      Heart disease Father      Depression Maternal Grandmother      Diabetes Maternal Grandmother      Hypertension Maternal Grandmother      Coronary artery disease Paternal Grandmother      Diabetes Paternal Grandmother      Cardiomyopathy Paternal Grandfather      COPD Paternal Grandfather      Kidney disease Paternal Grandfather          Review of patient's allergies indicates:   Allergen Reactions    Sulfamethoxazole-trimethoprim Hives        Immunization History   Administered Date(s) Administered    HPV 9-Valent 08/12/2019, 10/09/2019, 10/14/2020    Hepatitis B 03/23/1998, 08/05/1998    Hepatitis B, Adolescent/High Risk Infant 10/06/1998    Hepatitis B, Pediatric/Adolescent 10/06/1998    Influenza - Quadrivalent 10/14/2020, 10/11/2021    Influenza - Quadrivalent - PF *Preferred* (6 months and older) 10/09/2019, 10/14/2020, 10/11/2021,  "11/30/2022, 11/06/2023    Meningococcal Conjugate (MCV4P) 12/09/2019, 01/22/2021    OPV 03/23/1998    PPD Test 06/14/2001    Pneumococcal Conjugate - 13 Valent 08/12/2019    Pneumococcal Polysaccharide - 23 Valent 12/09/2019    Td (ADULT) 03/23/1998, 08/05/1998    Tdap 03/24/2009, 11/05/2020        Review of Systems   Constitutional: Negative.    HENT: Negative.     Eyes: Negative.    Respiratory: Negative.     Cardiovascular: Negative.    Gastrointestinal: Negative.    Genitourinary: Negative.    Musculoskeletal: Negative.    Skin: Negative.    Neurological: Negative.    Endo/Heme/Allergies: Negative.    Psychiatric/Behavioral: Negative.     All other systems reviewed and are negative.         Objective:      /78 (BP Location: Left arm, Patient Position: Sitting)   Pulse 86   Temp 98.2 °F (36.8 °C) (Oral)   Resp 12   Ht 5' 2" (1.575 m)   Wt 47 kg (103 lb 8.1 oz)   BMI 18.93 kg/m²      Physical Exam  Vitals reviewed.   Constitutional:       General: She is not in acute distress.     Appearance: Normal appearance. She is not toxic-appearing.   HENT:      Mouth/Throat:      Mouth: Mucous membranes are moist.      Pharynx: Oropharynx is clear.   Eyes:      Conjunctiva/sclera: Conjunctivae normal.   Cardiovascular:      Rate and Rhythm: Normal rate and regular rhythm.   Pulmonary:      Effort: Pulmonary effort is normal. No respiratory distress.      Breath sounds: Normal breath sounds.   Abdominal:      General: Abdomen is flat. Bowel sounds are normal.      Palpations: Abdomen is soft.   Musculoskeletal:         General: Normal range of motion.      Cervical back: Normal range of motion.   Lymphadenopathy:      Cervical: No cervical adenopathy.   Skin:     General: Skin is warm and dry.   Neurological:      General: No focal deficit present.      Mental Status: She is alert and oriented to person, place, and time. Mental status is at baseline.   Psychiatric:         Mood and Affect: Mood normal.         " Behavior: Behavior normal.          Labs:   Lab Results   Component Value Date    WBC 8.53 07/10/2024    HGB 14.9 07/10/2024    HCT 45.1 07/10/2024    .1 (H) 07/10/2024     07/10/2024       CMP  Sodium   Date Value Ref Range Status   07/10/2024 141 136 - 145 mmol/L Final     Potassium   Date Value Ref Range Status   07/10/2024 4.4 3.5 - 5.1 mmol/L Final     Chloride   Date Value Ref Range Status   07/10/2024 108 (H) 98 - 107 mmol/L Final     CO2   Date Value Ref Range Status   07/10/2024 25 22 - 29 mmol/L Final     Blood Urea Nitrogen   Date Value Ref Range Status   07/10/2024 9.5 7.0 - 18.7 mg/dL Final     Creatinine   Date Value Ref Range Status   07/10/2024 1.15 (H) 0.55 - 1.02 mg/dL Final     Calcium   Date Value Ref Range Status   07/10/2024 9.9 8.4 - 10.2 mg/dL Final     Albumin   Date Value Ref Range Status   07/10/2024 4.4 3.5 - 5.0 g/dL Final     Bilirubin Total   Date Value Ref Range Status   07/10/2024 0.3 <=1.5 mg/dL Final     ALP   Date Value Ref Range Status   07/10/2024 59 40 - 150 unit/L Final     AST   Date Value Ref Range Status   07/10/2024 13 5 - 34 unit/L Final     ALT   Date Value Ref Range Status   07/10/2024 10 0 - 55 unit/L Final     eGFR   Date Value Ref Range Status   07/10/2024 >60 mL/min/1.73/m2 Final     Lab Results   Component Value Date    TSH 0.423 03/06/2024     Hep C Ab Interp   Date Value Ref Range Status   03/06/2024 Nonreactive Nonreactive Final     Syphilis Antibody   Date Value Ref Range Status   07/10/2024 Reactive (A) Nonreactive, Equivocal Final     RPR   Date Value Ref Range Status   07/10/2024 Non-Reactive Non-Reactive Final     Cholesterol Total   Date Value Ref Range Status   03/06/2024 182 <=200 mg/dL Final     HDL Cholesterol   Date Value Ref Range Status   03/06/2024 59 35 - 60 mg/dL Final     Triglyceride   Date Value Ref Range Status   03/06/2024 92 37 - 140 mg/dL Final     Cholesterol/HDL Ratio   Date Value Ref Range Status   03/06/2024 3 0 - 5 Final      Very Low Density Lipoprotein   Date Value Ref Range Status   03/06/2024 18  Final     LDL Cholesterol   Date Value Ref Range Status   03/06/2024 105.00 50.00 - 140.00 mg/dL Final     Vitamin D   Date Value Ref Range Status   03/06/2024 33.7 30.0 - 80.0 ng/mL Final     Results for orders placed or performed in visit on 03/07/23   CD4 Lymphocytes   Result Value Ref Range    Patient Age 37     WBC Absolute 5,500 4,500 - 11,500 /mm3    Lymph Percent 29 28 - 48 %    Lymph Absolute 1,595 1,260 - 5,520 x10(3)/mcL    CD4 % 41.3 %    CD4 Absolute 658.735 589 - 1,505 unit/L    T Cell Interp       Normal absolute lymphocyte count with normal absolute CD4+ lymphocyte count.  Riki Thompson MD       Results for orders placed or performed in visit on 07/10/24   HIV-1 RNA, Quantitative, PCR with Reflex to Genotype   Result Value Ref Range    HIV-1 RNA Detect/Quant, P Undetected Undetected copies/mL     Results for orders placed or performed in visit on 03/06/24   Quantiferon Gold TB   Result Value Ref Range    QuantiFERON-Tb Gold Plus Result Negative Negative    TB1 Ag minus Nil Result 0.01 IU/mL    TB2 Ag minus Nil Result -0.01 IU/mL    Mitogen minus Nil Result 6.21 IU/mL    Nil Result 0.04 IU/mL     Results for orders placed or performed in visit on 11/06/23   C.trach/N.gonor AMP RNA    Specimen: Throat   Result Value Ref Range    Chlamydia trachomatis amplified RNA Negative Negative    Neisseria gonorrhoeae amplified RNA Negative Negative    Source THROAT     SOURCE: Throat      Results for orders placed or performed in visit on 07/10/24   Urinalysis   Result Value Ref Range    Color, UA Light-Yellow Yellow, Light-Yellow, Dark Yellow, Dianne, Straw    Appearance, UA Clear Clear    Specific Gravity, UA 1.021 1.005 - 1.030    pH, UA 6.5 5.0 - 8.5    Protein, UA Negative Negative    Glucose, UA Normal Negative, Normal    Ketones, UA Negative Negative    Blood, UA Negative Negative    Bilirubin, UA Negative Negative     Urobilinogen, UA Normal 0.2, 1.0, Normal    Nitrites, UA Negative Negative    Leukocyte Esterase, UA Negative Negative    RBC, UA 0-5 None Seen, 0-2, 3-5, 0-5 /HPF    WBC, UA 0-5 None Seen, 0-2, 3-5, 0-5 /HPF    Bacteria, UA None Seen None Seen /HPF    Squamous Epithelial Cells, UA Moderate (A) None Seen /HPF    Mucous, UA Trace (A) None Seen /LPF    Hyaline Casts, UA None Seen None Seen /lpf       Imaging: Reviewed most recent relevant imaging studies available, notable results highlighted in this note    Medications:     Current Outpatient Medications   Medication Instructions    ascorbic acid (vitamin C) (VITAMIN C) 500 mg, Daily    buPROPion (WELLBUTRIN XL) 150 mg    dolutegravir-lamivudine  mg Tab 1 tablet, Oral, Daily    ergocalciferol (VITAMIN D2) 50,000 Units, Oral, Daily    ferrous sulfate (FEROSUL) 325 mg, Oral, Daily    mupirocin (BACTROBAN) 2 % ointment Topical (Top), 3 times daily    valACYclovir (VALTREX) 1,000 mg, Oral, Daily       Assessment:       Problem List Items Addressed This Visit          ID    HIV infection - Primary    Relevant Medications    dolutegravir-lamivudine  mg Tab    Other Relevant Orders    Comprehensive Metabolic Panel    HIV-1 RNA, Quantitative, PCR with Reflex to Genotype     Other Visit Diagnoses       History of nephrectomy, right        Need for vaccination        Relevant Medications    influenza (Flulaval, Fluzone, Fluarix) 45 mcg/0.5 mL IM vaccine (> or = 6 mo) 0.5 mL    pneumoc 20-jasmin conj-dip cr(PF) (PREVNAR-20 (PF)) injection Syrg 0.5 mL (Start on 11/12/2024  3:15 PM)               Plan:      Asymptomatic HIV infection, with no history of HIV-related illness  -     Comprehensive Metabolic Panel; Future; Expected date: 11/12/2024  -     HIV-1 RNA, Quantitative, PCR with Reflex to Genotype; Future; Expected date: 11/12/2024  -     dolutegravir-lamivudine  mg Tab; Take 1 tablet by mouth once daily.  Dispense: 30 tablet; Refill: 3  Diagnosed 6/2019.  VL  [facility] 8260, CD4 317.  Adherence and sexual health counseling done.  Use condoms for all sexual encounters.  Blood precautions.  Discontinue Triumeq.  Replace with Dovato 1 po daily as prescribed.  Labs today as ordered.  RTC 2 months with Ashley.      Wellness:  Cervical pap: colpo 2/21, 7/23 HGSIL, [facility] 10/23 [facility]GSIL  Anal pap: 10/20 Neg  Cervical CT/GC: 4/23 Neg, 11/23 Neg  Anal CT/GC: 10/20 Neg  Oral CT/GC: 10/20 Neg, 11/23 Neg  Eye exam: 3/21 (will go to [facility], 3/24)    History of nephrectomy, right  S/P pyelonephritis 2014    Need for vaccination  -     influenza (Flulaval, Fluzone, Fluarix) 45 mcg/0.5 mL IM vaccine (> or = 6 mo) 0.5 mL  -     pneumoc 20-jasmin conj-dip cr(PF) (PREVNAR-20 (PF)) injection Syrg 0.5 mL  Flu & PCV 20 today.

## 2024-11-12 NOTE — PROGRESS NOTES
Pneumonia and Influenza Vaccinations given IM left deltoid using aseptic tech. Patient tolerated well. To contact clinic prn.

## 2024-11-12 NOTE — PROGRESS NOTES
NEVA for recent pap smear completed and faxed to [facility] clinic. Fax confirmed with verification sheet.

## 2024-11-13 ENCOUNTER — PATIENT OUTREACH (OUTPATIENT)
Dept: INFECTIOUS DISEASES | Facility: CLINIC | Age: 39
End: 2024-11-13
Payer: MEDICAID

## 2024-11-13 LAB
PAP RECOMMENDATION EXT: NORMAL
PAP RECOMMENDATION EXT: NORMAL

## 2024-11-26 ENCOUNTER — TELEPHONE (OUTPATIENT)
Dept: INFECTIOUS DISEASES | Facility: CLINIC | Age: 39
End: 2024-11-26
Payer: MEDICAID

## 2024-11-26 NOTE — TELEPHONE ENCOUNTER
"Patient returned call.  has been taking generic Dovato for approximately 1 1/2 weeks and yesterday, all of a sudden felt "strange" and hands felt clammy and then awoke as someone was picking her up off of the floor. Denies Nausea, Vomiting and  has been eating well. Wanted to let provider know. Appreciative of call.   "

## 2024-11-26 NOTE — TELEPHONE ENCOUNTER
----- Message from Maxine sent at 11/26/2024  9:01 AM CST -----  Patient of Ashley    Patient stated she started the new medication dolutegravir-lamivudine  mg Tab for about a week and passed out at work yesterday afternoon.  Not sure if it is the medication.      [phone]  9:05  Thanks,

## 2025-01-13 ENCOUNTER — LAB VISIT (OUTPATIENT)
Dept: LAB | Facility: HOSPITAL | Age: 40
End: 2025-01-13
Attending: NURSE PRACTITIONER
Payer: MEDICAID

## 2025-01-13 ENCOUNTER — OFFICE VISIT (OUTPATIENT)
Dept: INFECTIOUS DISEASES | Facility: CLINIC | Age: 40
End: 2025-01-13
Payer: MEDICAID

## 2025-01-13 VITALS
HEIGHT: 62 IN | SYSTOLIC BLOOD PRESSURE: 94 MMHG | HEART RATE: 73 BPM | BODY MASS INDEX: 19.36 KG/M2 | DIASTOLIC BLOOD PRESSURE: 65 MMHG | TEMPERATURE: 98 F | RESPIRATION RATE: 12 BRPM | WEIGHT: 105.19 LBS

## 2025-01-13 DIAGNOSIS — Z21 ASYMPTOMATIC HIV INFECTION, WITH NO HISTORY OF HIV-RELATED ILLNESS: ICD-10-CM

## 2025-01-13 DIAGNOSIS — Z11.3 ROUTINE SCREENING FOR STI (SEXUALLY TRANSMITTED INFECTION): ICD-10-CM

## 2025-01-13 DIAGNOSIS — Z21 ASYMPTOMATIC HIV INFECTION, WITH NO HISTORY OF HIV-RELATED ILLNESS: Primary | ICD-10-CM

## 2025-01-13 LAB
25(OH)D3+25(OH)D2 SERPL-MCNC: 35 NG/ML (ref 30–80)
ALBUMIN SERPL-MCNC: 4.1 G/DL (ref 3.5–5)
ALBUMIN/GLOB SERPL: 1.2 RATIO (ref 1.1–2)
ALP SERPL-CCNC: 59 UNIT/L (ref 40–150)
ALT SERPL-CCNC: 18 UNIT/L (ref 0–55)
ANION GAP SERPL CALC-SCNC: 7 MEQ/L
AST SERPL-CCNC: 18 UNIT/L (ref 5–34)
BACTERIA #/AREA URNS AUTO: ABNORMAL /HPF
BASOPHILS # BLD AUTO: 0.02 X10(3)/MCL
BASOPHILS NFR BLD AUTO: 0.3 %
BILIRUB SERPL-MCNC: 0.4 MG/DL
BILIRUB UR QL STRIP.AUTO: NEGATIVE
BUN SERPL-MCNC: 9.7 MG/DL (ref 7–18.7)
C TRACH DNA SPEC QL NAA+PROBE: NOT DETECTED
CALCIUM SERPL-MCNC: 9.2 MG/DL (ref 8.4–10.2)
CHLORIDE SERPL-SCNC: 106 MMOL/L (ref 98–107)
CHOLEST SERPL-MCNC: 185 MG/DL
CHOLEST/HDLC SERPL: 3 {RATIO} (ref 0–5)
CLARITY UR: CLEAR
CO2 SERPL-SCNC: 25 MMOL/L (ref 22–29)
COLOR UR AUTO: ABNORMAL
CREAT SERPL-MCNC: 1.12 MG/DL (ref 0.55–1.02)
CREAT/UREA NIT SERPL: 9
EOSINOPHIL # BLD AUTO: 0.14 X10(3)/MCL (ref 0–0.9)
EOSINOPHIL NFR BLD AUTO: 1.8 %
ERYTHROCYTE [DISTWIDTH] IN BLOOD BY AUTOMATED COUNT: 12.7 % (ref 11.5–17)
EST. AVERAGE GLUCOSE BLD GHB EST-MCNC: 96.8 MG/DL
GFR SERPLBLD CREATININE-BSD FMLA CKD-EPI: >60 ML/MIN/1.73/M2
GLOBULIN SER-MCNC: 3.3 GM/DL (ref 2.4–3.5)
GLUCOSE SERPL-MCNC: 77 MG/DL (ref 74–100)
GLUCOSE UR QL STRIP: NORMAL
HAV AB SER QL IA: REACTIVE
HBA1C MFR BLD: 5 %
HBV SURFACE AB SER-ACNC: 87.72 MIU/ML
HBV SURFACE AB SERPL IA-ACNC: REACTIVE M[IU]/ML
HCT VFR BLD AUTO: 41.8 % (ref 37–47)
HCV AB SERPL QL IA: NONREACTIVE
HDLC SERPL-MCNC: 66 MG/DL (ref 35–60)
HGB BLD-MCNC: 13.9 G/DL (ref 12–16)
HGB UR QL STRIP: NEGATIVE
HYALINE CASTS #/AREA URNS LPF: ABNORMAL /LPF
IMM GRANULOCYTES # BLD AUTO: 0.02 X10(3)/MCL (ref 0–0.04)
IMM GRANULOCYTES NFR BLD AUTO: 0.3 %
KETONES UR QL STRIP: NEGATIVE
LDLC SERPL CALC-MCNC: 103 MG/DL (ref 50–140)
LEUKOCYTE ESTERASE UR QL STRIP: NEGATIVE
LYMPHOCYTES # BLD AUTO: 1.7 X10(3)/MCL (ref 0.6–4.6)
LYMPHOCYTES NFR BLD AUTO: 22 %
MCH RBC QN AUTO: 33.7 PG (ref 27–31)
MCHC RBC AUTO-ENTMCNC: 33.3 G/DL (ref 33–36)
MCV RBC AUTO: 101.5 FL (ref 80–94)
MONOCYTES # BLD AUTO: 0.42 X10(3)/MCL (ref 0.1–1.3)
MONOCYTES NFR BLD AUTO: 5.4 %
MUCOUS THREADS URNS QL MICRO: ABNORMAL /LPF
N GONORRHOEA DNA SPEC QL NAA+PROBE: NOT DETECTED
NEUTROPHILS # BLD AUTO: 5.43 X10(3)/MCL (ref 2.1–9.2)
NEUTROPHILS NFR BLD AUTO: 70.2 %
NITRITE UR QL STRIP: NEGATIVE
NRBC BLD AUTO-RTO: 0 %
PH UR STRIP: 6.5 [PH]
PLATELET # BLD AUTO: 228 X10(3)/MCL (ref 130–400)
PMV BLD AUTO: 9.8 FL (ref 7.4–10.4)
POTASSIUM SERPL-SCNC: 4 MMOL/L (ref 3.5–5.1)
PROT SERPL-MCNC: 7.4 GM/DL (ref 6.4–8.3)
PROT UR QL STRIP: NEGATIVE
RBC # BLD AUTO: 4.12 X10(6)/MCL (ref 4.2–5.4)
RBC #/AREA URNS AUTO: ABNORMAL /HPF
RPR SER QL: NORMAL
SODIUM SERPL-SCNC: 138 MMOL/L (ref 136–145)
SOURCE (OHS): NORMAL
SP GR UR STRIP.AUTO: 1.01 (ref 1–1.03)
SQUAMOUS #/AREA URNS LPF: ABNORMAL /HPF
T PALLIDUM AB SER QL: REACTIVE
TRIGL SERPL-MCNC: 80 MG/DL (ref 37–140)
TSH SERPL-ACNC: 0.76 UIU/ML (ref 0.35–4.94)
UROBILINOGEN UR STRIP-ACNC: NORMAL
VLDLC SERPL CALC-MCNC: 16 MG/DL
WBC # BLD AUTO: 7.73 X10(3)/MCL (ref 4.5–11.5)
WBC #/AREA URNS AUTO: ABNORMAL /HPF

## 2025-01-13 PROCEDURE — 1160F RVW MEDS BY RX/DR IN RCRD: CPT | Mod: CPTII,,, | Performed by: NURSE PRACTITIONER

## 2025-01-13 PROCEDURE — 3074F SYST BP LT 130 MM HG: CPT | Mod: CPTII,,, | Performed by: NURSE PRACTITIONER

## 2025-01-13 PROCEDURE — 81015 MICROSCOPIC EXAM OF URINE: CPT | Performed by: NURSE PRACTITIONER

## 2025-01-13 PROCEDURE — 3078F DIAST BP <80 MM HG: CPT | Mod: CPTII,,, | Performed by: NURSE PRACTITIONER

## 2025-01-13 PROCEDURE — 86592 SYPHILIS TEST NON-TREP QUAL: CPT

## 2025-01-13 PROCEDURE — 1159F MED LIST DOCD IN RCRD: CPT | Mod: CPTII,,, | Performed by: NURSE PRACTITIONER

## 2025-01-13 PROCEDURE — 83036 HEMOGLOBIN GLYCOSYLATED A1C: CPT

## 2025-01-13 PROCEDURE — 86780 TREPONEMA PALLIDUM: CPT

## 2025-01-13 PROCEDURE — 84443 ASSAY THYROID STIM HORMONE: CPT

## 2025-01-13 PROCEDURE — 86706 HEP B SURFACE ANTIBODY: CPT

## 2025-01-13 PROCEDURE — 99214 OFFICE O/P EST MOD 30 MIN: CPT | Mod: PBBFAC | Performed by: NURSE PRACTITIONER

## 2025-01-13 PROCEDURE — 87536 HIV-1 QUANT&REVRSE TRNSCRPJ: CPT

## 2025-01-13 PROCEDURE — 86361 T CELL ABSOLUTE COUNT: CPT

## 2025-01-13 PROCEDURE — 86708 HEPATITIS A ANTIBODY: CPT

## 2025-01-13 PROCEDURE — 87591 N.GONORRHOEAE DNA AMP PROB: CPT | Performed by: NURSE PRACTITIONER

## 2025-01-13 PROCEDURE — 99214 OFFICE O/P EST MOD 30 MIN: CPT | Mod: S$PBB,,, | Performed by: NURSE PRACTITIONER

## 2025-01-13 PROCEDURE — 85025 COMPLETE CBC W/AUTO DIFF WBC: CPT

## 2025-01-13 PROCEDURE — 80061 LIPID PANEL: CPT

## 2025-01-13 PROCEDURE — 82306 VITAMIN D 25 HYDROXY: CPT

## 2025-01-13 PROCEDURE — 86803 HEPATITIS C AB TEST: CPT

## 2025-01-13 PROCEDURE — 86480 TB TEST CELL IMMUN MEASURE: CPT

## 2025-01-13 PROCEDURE — 80053 COMPREHEN METABOLIC PANEL: CPT

## 2025-01-13 PROCEDURE — 3008F BODY MASS INDEX DOCD: CPT | Mod: CPTII,,, | Performed by: NURSE PRACTITIONER

## 2025-01-13 PROCEDURE — 36415 COLL VENOUS BLD VENIPUNCTURE: CPT

## 2025-01-13 NOTE — PROGRESS NOTES
Patient ID: Gayatri Márquez 39 y.o.     Chief Complaint:   Chief Complaint   Patient presents with    Followup HIV     Denies problems        HPI:    1/13/25  Gayatri is a 40 yo WF here today for HIV f/u visit. She has switched from Triumeq to Dovato as prescribed last visit. She is tolerating it well and has not noted any side effects. Will collect labs today for re-evaluation.  She has been intimate with a new partner, uses condoms for vaginal intercourse. Has engaged in oral sex as well. Will screen appropriately. Overdue for eye exam, will call [facility] to schedule a visit. All questions answered & concerns addressed.    11/12/24  Gayatri is a 40 yo WF presenting today for HIV f/u visit.  She has been taking Triumeq since time of diagnosis in 2019 and tolerates it well. Labs 7/24 VL UD, CD4 593, RPR NR. She is very adherent to treatment plan and does not miss doses.  Discussed with patient the possible risk for cardiac disease with abacavir containing regimens.  Shared decision making, agrees to switch to Dovato. Baseline VL <9000. Will notify me for any concerns. Will repeat VL today & in 6-8 weeks. She did have cervical pap a couple of months ago, will request records from [facility] [place] office. She is due for flu & pneumonia vaccinations, amenable to both today. She states that she has left her ex-boyfriend and is at peace. 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 All questions answered & concerns addressed.     7/10/24  Gayatri is a 39 yo WF here today for HIV f/u visit.  She is virally suppressed on Triumeq daily and tolerates it well. Labs 3/24 VL UD, CD4 478, RPR NR.  She has not had any recent HSV outbreaks, but would like to have Valtrex on hand for PRN use.  Refills authorized. She has not yet scheduled eye exam, agrees to do so. GYN appt has been rescheduled and she does plan to attend as scheduled. Today, she tells me that she has been having a sore throat and tender lesion  to right nares x 5 days. Denies fever or sinus tenderness. She has a mild cough, non-productive. Has not tried any OTC medications to avoid DDI.  She has not had any new sexual partners, but is not sure if male partner is faithful. Appreciates STI screenings today. All questions answered & concerns addressed.           Past Medical History:   Diagnosis Date    Abnormal Pap smear of cervix     H/O syphilis     Herpes simplex virus (HSV) infection     Valtrex PRN    Human immunodeficiency virus (HIV) disease     On triumeq, VL undetectable 8/2023    Insomnia     Vitamin D deficiency         Past Surgical History:   Procedure Laterality Date    COLD KNIFE CONIZATION OF CERVIX N/A 10/5/2023    Procedure: CONE BIOPSY, CERVIX, USING COLD KNIFE;  Surgeon: Shara Lyle MD;  Location: [facility];  Service: OB/GYN;  Laterality: N/A;    COLPOSCOPY, WITH BIOPSY OF CERVIX  07/28/2023    NEPHRECTOMY  05/19/2014    right    VASCULAR SURGERY          Social History     Socioeconomic History    Marital status: Significant Other    Number of children: 2   Tobacco Use    Smoking status: Every Day     Types: Vaping with nicotine    Smokeless tobacco: Current    Tobacco comments:     Stopped smoking cigarettes x 2 weeks. Vaping for now   Substance and Sexual Activity    Alcohol use: Not Currently     Comment: Denies    Drug use: Yes     Frequency: 7.0 times per week     Types: Marijuana    Sexual activity: Not Currently     Partners: Male     Birth control/protection: None, Condom     Comment: single     Social Drivers of Health     Financial Resource Strain: Low Risk  (11/30/2022)    Overall Financial Resource Strain (CARDIA)     Difficulty of Paying Living Expenses: Not hard at all   Food Insecurity: No Food Insecurity (11/30/2022)    Hunger Vital Sign     Worried About Running Out of Food in the Last Year: Never true     Ran Out of Food in the Last Year: Never true   Transportation Needs: No Transportation Needs (11/30/2022)     PRAPARE - Transportation     Lack of Transportation (Medical): No     Lack of Transportation (Non-Medical): No   Physical Activity: Inactive (11/30/2022)    Exercise Vital Sign     Days of Exercise per Week: 0 days     Minutes of Exercise per Session: 0 min   Stress: Stress Concern Present (11/30/2022)    [employer] - Occupational Stress Questionnaire     Feeling of Stress : To some extent   Housing Stability: Low Risk  (11/30/2022)    Housing Stability Vital Sign     Unable to Pay for Housing in the Last Year: No     Number of Places Lived in the Last Year: 1     Unstable Housing in the Last Year: No        Family History   Problem Relation Name Age of Onset    Arthritis Mother      Depression Mother      Hyperlipidemia Mother      Migraines Mother      Heart disease Father      Depression Maternal Grandmother      Diabetes Maternal Grandmother      Hypertension Maternal Grandmother      Coronary artery disease Paternal Grandmother      Diabetes Paternal Grandmother      Cardiomyopathy Paternal Grandfather      COPD Paternal Grandfather      Kidney disease Paternal Grandfather          Review of patient's allergies indicates:   Allergen Reactions    Sulfamethoxazole-trimethoprim Hives        Immunization History   Administered Date(s) Administered    HPV 9-Valent 08/12/2019, 10/09/2019, 10/14/2020    Hepatitis B 03/23/1998, 08/05/1998    Hepatitis B, Adolescent/High Risk Infant 10/06/1998    Hepatitis B, Pediatric/Adolescent 10/06/1998    Influenza - Quadrivalent 10/14/2020, 10/11/2021    Influenza - Quadrivalent - PF *Preferred* (6 months and older) 10/09/2019, 10/14/2020, 10/11/2021, 11/30/2022, 11/06/2023    Influenza - Trivalent - Fluarix, Flulaval, Fluzone, Afluria - PF 11/12/2024    Meningococcal Conjugate (MCV4P) 12/09/2019, 01/22/2021    OPV 03/23/1998    PPD Test 06/14/2001    Pneumococcal Conjugate - 13 Valent 08/12/2019    Pneumococcal Conjugate - 20 Valent 11/12/2024     "Pneumococcal Polysaccharide - 23 Valent 12/09/2019    Td (ADULT) 03/23/1998, 08/05/1998    Tdap 03/24/2009, 11/05/2020        Review of Systems   Constitutional: Negative.    HENT: Negative.     Eyes: Negative.    Respiratory: Negative.     Cardiovascular: Negative.    Gastrointestinal: Negative.    Genitourinary: Negative.    Musculoskeletal: Negative.    Skin: Negative.    Neurological: Negative.    Endo/Heme/Allergies: Negative.    Psychiatric/Behavioral: Negative.     All other systems reviewed and are negative.         Objective:      BP 94/65 (BP Location: Left arm, Patient Position: Sitting)   Pulse 73   Temp 97.9 °F (36.6 °C) (Oral)   Resp 12   Ht 5' 2" (1.575 m)   Wt 47.7 kg (105 lb 2.6 oz)   BMI 19.23 kg/m²      Physical Exam  Vitals reviewed.   Constitutional:       General: She is not in acute distress.     Appearance: Normal appearance. She is not toxic-appearing.   HENT:      Mouth/Throat:      Mouth: Mucous membranes are moist.      Pharynx: Oropharynx is clear.   Eyes:      Conjunctiva/sclera: Conjunctivae normal.   Cardiovascular:      Rate and Rhythm: Normal rate and regular rhythm.   Pulmonary:      Effort: Pulmonary effort is normal. No respiratory distress.      Breath sounds: Normal breath sounds.   Abdominal:      General: Abdomen is flat. Bowel sounds are normal.      Palpations: Abdomen is soft.   Musculoskeletal:         General: Normal range of motion.      Cervical back: Normal range of motion.   Lymphadenopathy:      Cervical: No cervical adenopathy.   Skin:     General: Skin is warm and dry.   Neurological:      General: No focal deficit present.      Mental Status: She is alert and oriented to person, place, and time. Mental status is at baseline.   Psychiatric:         Mood and Affect: Mood normal.         Behavior: Behavior normal.          Labs:   Lab Results   Component Value Date    WBC 8.53 07/10/2024    HGB 14.9 07/10/2024    HCT 45.1 07/10/2024    .1 (H) 07/10/2024 "     07/10/2024       CMP  Sodium   Date Value Ref Range Status   07/10/2024 141 136 - 145 mmol/L Final     Potassium   Date Value Ref Range Status   07/10/2024 4.4 3.5 - 5.1 mmol/L Final     Chloride   Date Value Ref Range Status   07/10/2024 108 (H) 98 - 107 mmol/L Final     CO2   Date Value Ref Range Status   07/10/2024 25 22 - 29 mmol/L Final     Blood Urea Nitrogen   Date Value Ref Range Status   07/10/2024 9.5 7.0 - 18.7 mg/dL Final     Creatinine   Date Value Ref Range Status   07/10/2024 1.15 (H) 0.55 - 1.02 mg/dL Final     Calcium   Date Value Ref Range Status   07/10/2024 9.9 8.4 - 10.2 mg/dL Final     Albumin   Date Value Ref Range Status   07/10/2024 4.4 3.5 - 5.0 g/dL Final     Bilirubin Total   Date Value Ref Range Status   07/10/2024 0.3 <=1.5 mg/dL Final     ALP   Date Value Ref Range Status   07/10/2024 59 40 - 150 unit/L Final     AST   Date Value Ref Range Status   07/10/2024 13 5 - 34 unit/L Final     ALT   Date Value Ref Range Status   07/10/2024 10 0 - 55 unit/L Final     eGFR   Date Value Ref Range Status   07/10/2024 >60 mL/min/1.73/m2 Final     Lab Results   Component Value Date    TSH 0.423 03/06/2024     Hep C Ab Interp   Date Value Ref Range Status   03/06/2024 Nonreactive Nonreactive Final     Syphilis Antibody   Date Value Ref Range Status   07/10/2024 Reactive (A) Nonreactive, Equivocal Final     RPR   Date Value Ref Range Status   07/10/2024 Non-Reactive Non-Reactive Final     Cholesterol Total   Date Value Ref Range Status   03/06/2024 182 <=200 mg/dL Final     HDL Cholesterol   Date Value Ref Range Status   03/06/2024 59 35 - 60 mg/dL Final     Triglyceride   Date Value Ref Range Status   03/06/2024 92 37 - 140 mg/dL Final     Cholesterol/HDL Ratio   Date Value Ref Range Status   03/06/2024 3 0 - 5 Final     Very Low Density Lipoprotein   Date Value Ref Range Status   03/06/2024 18  Final     LDL Cholesterol   Date Value Ref Range Status   03/06/2024 105.00 50.00 - 140.00  mg/dL Final     Vitamin D   Date Value Ref Range Status   03/06/2024 33.7 30.0 - 80.0 ng/mL Final     Results for orders placed or performed in visit on 03/07/23   CD4 Lymphocytes   Result Value Ref Range    Patient Age 37     WBC Absolute 5,500 4,500 - 11,500 /mm3    Lymph Percent 29 28 - 48 %    Lymph Absolute 1,595 1,260 - 5,520 x10(3)/mcL    CD4 % 41.3 %    CD4 Absolute 658.735 589 - 1,505 unit/L    T Cell Interp       Normal absolute lymphocyte count with normal absolute CD4+ lymphocyte count.  Riki Thompson MD       Results for orders placed or performed in visit on 07/10/24   HIV-1 RNA, Quantitative, PCR with Reflex to Genotype   Result Value Ref Range    HIV-1 RNA Detect/Quant, P Undetected Undetected copies/mL     Results for orders placed or performed in visit on 03/06/24   Quantiferon Gold TB   Result Value Ref Range    QuantiFERON-Tb Gold Plus Result Negative Negative    TB1 Ag minus Nil Result 0.01 IU/mL    TB2 Ag minus Nil Result -0.01 IU/mL    Mitogen minus Nil Result 6.21 IU/mL    Nil Result 0.04 IU/mL     Results for orders placed or performed in visit on 11/06/23   C.trach/N.gonor AMP RNA    Specimen: Throat   Result Value Ref Range    Chlamydia trachomatis amplified RNA Negative Negative    Neisseria gonorrhoeae amplified RNA Negative Negative    Source THROAT     SOURCE: Throat      Results for orders placed or performed in visit on 07/10/24   Urinalysis   Result Value Ref Range    Color, UA Light-Yellow Yellow, Light-Yellow, Dark Yellow, Dianne, Straw    Appearance, UA Clear Clear    Specific Gravity, UA 1.021 1.005 - 1.030    pH, UA 6.5 5.0 - 8.5    Protein, UA Negative Negative    Glucose, UA Normal Negative, Normal    Ketones, UA Negative Negative    Blood, UA Negative Negative    Bilirubin, UA Negative Negative    Urobilinogen, UA Normal 0.2, 1.0, Normal    Nitrites, UA Negative Negative    Leukocyte Esterase, UA Negative Negative    RBC, UA 0-5 None Seen, 0-2, 3-5, 0-5 /HPF    WBC, UA 0-5  None Seen, 0-2, 3-5, 0-5 /HPF    Bacteria, UA None Seen None Seen /HPF    Squamous Epithelial Cells, UA Moderate (A) None Seen /HPF    Mucous, UA Trace (A) None Seen /LPF    Hyaline Casts, UA None Seen None Seen /lpf       Imaging: Reviewed most recent relevant imaging studies available, notable results highlighted in this note    Medications:     Current Outpatient Medications   Medication Instructions    ascorbic acid (vitamin C) (VITAMIN C) 500 mg, Daily    buPROPion (WELLBUTRIN XL) 150 mg    dolutegravir-lamivudine  mg Tab 1 tablet, Oral, Daily    ergocalciferol (VITAMIN D2) 50,000 Units, Oral, Daily    ferrous sulfate (FEROSUL) 325 mg, Oral, Daily    mupirocin (BACTROBAN) 2 % ointment Topical (Top), 3 times daily    valACYclovir (VALTREX) 1,000 mg, Oral, Daily       Assessment:       Problem List Items Addressed This Visit       HIV infection - Primary    Relevant Medications    dolutegravir-lamivudine  mg Tab    Other Relevant Orders    Quantiferon Gold TB    Hemoglobin A1C    Vitamin D    TSH    Lipid Panel    Comprehensive Metabolic Panel    CBC Auto Differential    CD4 Lymphocytes    HIV-1 RNA, Quantitative, PCR with Reflex to Genotype    Hepatitis A antibody, IgG    Hepatitis B Surface Ab, Qualitative    Urinalysis, Reflex to Urine Culture     Other Visit Diagnoses       Routine screening for STI (sexually transmitted infection)        Relevant Orders    Hepatitis C Antibody    SYPHILIS ANTIBODY (WITH REFLEX RPR)    Chlamydia/GC, PCR    C.trach/N.gonor AMP RNA               Plan:      Asymptomatic HIV infection, with no history of HIV-related illness  -     Quantiferon Gold TB; Future; Expected date: 01/13/2025  -     Hemoglobin A1C; Future; Expected date: 01/13/2025  -     Vitamin D; Future; Expected date: 01/13/2025  -     TSH; Future; Expected date: 01/13/2025  -     Lipid Panel; Future; Expected date: 01/13/2025  -     Comprehensive Metabolic Panel; Future; Expected date: 01/13/2025  -      CBC Auto Differential; Future; Expected date: 01/13/2025  -     CD4 Lymphocytes; Future; Expected date: 01/13/2025  -     HIV-1 RNA, Quantitative, PCR with Reflex to Genotype; Future; Expected date: 01/13/2025  -     Hepatitis A antibody, IgG; Future; Expected date: 01/13/2025  -     Hepatitis B Surface Ab, Qualitative; Future; Expected date: 01/13/2025  -     Urinalysis, Reflex to Urine Culture  -     dolutegravir-lamivudine  mg Tab; Take 1 tablet by mouth once daily.  Dispense: 30 tablet; Refill: 3  Diagnosed 6/2019.  VL [facility] 8260, CD4 317.  Adherence and sexual health counseling done.  Use condoms for all sexual encounters.  Blood precautions.  Continue Dovato 1 po daily as prescribed.  Labs today as ordered.  RTC 4 months with Ashley.      Wellness:  Cervical pap: colpo 2/21, 7/23 HGSIL, [facility] 10/23 [facility]GSIL, 10/8/24  Anal pap: 10/20 Neg  Cervical CT/GC: 4/23 Neg, 11/23 Neg, 1/25  Anal CT/GC: 10/20 Neg  Oral CT/GC: 10/20 Neg, 11/23 Neg, 1/25  Eye exam: 3/21 (will go to [facility], 3/24 & 1/25)    Routine screening for STI (sexually transmitted infection)  -     Hepatitis C Antibody; Future; Expected date: 01/13/2025  -     SYPHILIS ANTIBODY (WITH REFLEX RPR); Future; Expected date: 01/13/2025  -     Chlamydia/GC, PCR  -     C.trach/N.gonor AMP RNA; Future; Expected date: 01/13/2025  Oral & urine samples collected for ct/gc screening.   RPR & HCV ab screening today.

## 2025-01-13 NOTE — LETTER
January 13, 2025      [facility]  [address] [address]  Phone: [phone]       Patient: Gayatri Márquez   YOB: 1985  Date of Visit: 01/13/2025    To Whom It May Concern:    Anita Márquez  was at [facility] on 01/13/2025. The patient may return to work on next scheduled shift with no restrictions. If you have any questions or concerns, or if I can be of further assistance, please do not hesitate to contact me.    Sincerely,    Tracey Rg LPN

## 2025-01-14 LAB
AGE: 37
CD3+CD4+ CELLS # SPEC: 745 UNIT/L (ref 589–1505)
CD3+CD4+ CELLS NFR BLD: 43.8 %
LYMPHOCYTES # BLD AUTO: 1700.6 X10(3)/MCL (ref 1260–5520)
LYMPHOCYTES NFR LN MANUAL: 22 % (ref 28–48)
LYMPHOMA - T-CELL MARKERS SPEC-IMP: ABNORMAL
WBC # BLD AUTO: 7730 /MM3 (ref 4500–11500)

## 2025-01-15 LAB
C TRACH RRNA SPEC QL NAA+PROBE: NEGATIVE
GAMMA INTERFERON BACKGROUND BLD IA-ACNC: 0.04 IU/ML
HIV1 RNA # PLAS NAA DL=20: NORMAL COPIES/ML
M TB IFN-G BLD-IMP: NEGATIVE
M TB IFN-G CD4+ BCKGRND COR BLD-ACNC: 0 IU/ML
M TB IFN-G CD4+CD8+ BCKGRND COR BLD-ACNC: -0.01 IU/ML
MITOGEN IGNF BCKGRD COR BLD-ACNC: 9.96 IU/ML
N GONORRHOEA RRNA SPEC QL NAA+PROBE: NEGATIVE
SPECIMEN SOURCE: NORMAL
SPECIMEN SOURCE: NORMAL

## 2025-01-16 LAB — T PALLIDUM AB SER QL AGGL: NEGATIVE

## 2025-03-18 ENCOUNTER — TELEPHONE (OUTPATIENT)
Dept: INFECTIOUS DISEASES | Facility: CLINIC | Age: 40
End: 2025-03-18
Payer: MEDICAID

## 2025-03-18 DIAGNOSIS — D64.9 ANEMIA, UNSPECIFIED TYPE: ICD-10-CM

## 2025-03-18 DIAGNOSIS — E55.9 VITAMIN D DEFICIENCY: Primary | ICD-10-CM

## 2025-03-18 RX ORDER — ERGOCALCIFEROL 1.25 MG/1
50000 CAPSULE ORAL
COMMUNITY
End: 2025-03-18 | Stop reason: SDUPTHER

## 2025-03-18 RX ORDER — ERGOCALCIFEROL 1.25 MG/1
50000 CAPSULE ORAL
Qty: 12 CAPSULE | Refills: 3 | Status: SHIPPED | OUTPATIENT
Start: 2025-03-18

## 2025-03-18 RX ORDER — FERROUS SULFATE 325(65) MG
325 TABLET ORAL DAILY
Qty: 90 TABLET | Refills: 3 | Status: SHIPPED | OUTPATIENT
Start: 2025-03-18

## 2025-03-18 NOTE — TELEPHONE ENCOUNTER
Last visit with Ashley Quiroz, APRN: 1/13/2025  Last visit in [facility] INFECTIOUS DISEASE: 1/13/2025    Patient's next visit in [facility] INFECTIOUS DISEASE: 5/14/2025     LL 01/13/2025      Please advise on vitamin refills.

## 2025-05-14 ENCOUNTER — LAB VISIT (OUTPATIENT)
Dept: LAB | Facility: HOSPITAL | Age: 40
End: 2025-05-14
Attending: NURSE PRACTITIONER
Payer: MEDICAID

## 2025-05-14 ENCOUNTER — OFFICE VISIT (OUTPATIENT)
Dept: INFECTIOUS DISEASES | Facility: CLINIC | Age: 40
End: 2025-05-14
Payer: MEDICAID

## 2025-05-14 VITALS
BODY MASS INDEX: 20 KG/M2 | SYSTOLIC BLOOD PRESSURE: 100 MMHG | RESPIRATION RATE: 10 BRPM | TEMPERATURE: 98 F | WEIGHT: 108.69 LBS | HEART RATE: 71 BPM | DIASTOLIC BLOOD PRESSURE: 68 MMHG | HEIGHT: 62 IN

## 2025-05-14 DIAGNOSIS — B00.1 HERPES LABIALIS: ICD-10-CM

## 2025-05-14 DIAGNOSIS — Z23 NEED FOR VACCINATION: ICD-10-CM

## 2025-05-14 DIAGNOSIS — Z21 ASYMPTOMATIC HIV INFECTION, WITH NO HISTORY OF HIV-RELATED ILLNESS: ICD-10-CM

## 2025-05-14 DIAGNOSIS — Z21 ASYMPTOMATIC HIV INFECTION, WITH NO HISTORY OF HIV-RELATED ILLNESS: Primary | ICD-10-CM

## 2025-05-14 LAB
ALBUMIN SERPL-MCNC: 4.2 G/DL (ref 3.5–5)
ALBUMIN/GLOB SERPL: 1.3 RATIO (ref 1.1–2)
ALP SERPL-CCNC: 50 UNIT/L (ref 40–150)
ALT SERPL-CCNC: 15 UNIT/L (ref 0–55)
ANION GAP SERPL CALC-SCNC: 7 MEQ/L
AST SERPL-CCNC: 17 UNIT/L (ref 11–45)
BILIRUB SERPL-MCNC: 0.5 MG/DL
BUN SERPL-MCNC: 14.7 MG/DL (ref 7–18.7)
CALCIUM SERPL-MCNC: 9.2 MG/DL (ref 8.4–10.2)
CHLORIDE SERPL-SCNC: 107 MMOL/L (ref 98–107)
CO2 SERPL-SCNC: 24 MMOL/L (ref 22–29)
CREAT SERPL-MCNC: 0.88 MG/DL (ref 0.55–1.02)
CREAT/UREA NIT SERPL: 17
GFR SERPLBLD CREATININE-BSD FMLA CKD-EPI: >60 ML/MIN/1.73/M2
GLOBULIN SER-MCNC: 3.2 GM/DL (ref 2.4–3.5)
GLUCOSE SERPL-MCNC: 85 MG/DL (ref 74–100)
POTASSIUM SERPL-SCNC: 4.7 MMOL/L (ref 3.5–5.1)
PROT SERPL-MCNC: 7.4 GM/DL (ref 6.4–8.3)
SODIUM SERPL-SCNC: 138 MMOL/L (ref 136–145)

## 2025-05-14 PROCEDURE — 87536 HIV-1 QUANT&REVRSE TRNSCRPJ: CPT

## 2025-05-14 PROCEDURE — 80053 COMPREHEN METABOLIC PANEL: CPT

## 2025-05-14 PROCEDURE — 3074F SYST BP LT 130 MM HG: CPT | Mod: CPTII,,, | Performed by: NURSE PRACTITIONER

## 2025-05-14 PROCEDURE — 99214 OFFICE O/P EST MOD 30 MIN: CPT | Mod: PBBFAC | Performed by: NURSE PRACTITIONER

## 2025-05-14 PROCEDURE — 36415 COLL VENOUS BLD VENIPUNCTURE: CPT

## 2025-05-14 PROCEDURE — 3078F DIAST BP <80 MM HG: CPT | Mod: CPTII,,, | Performed by: NURSE PRACTITIONER

## 2025-05-14 PROCEDURE — 90739 HEPB VACC 2/4 DOSE ADULT IM: CPT | Mod: PBBFAC

## 2025-05-14 PROCEDURE — 1160F RVW MEDS BY RX/DR IN RCRD: CPT | Mod: CPTII,,, | Performed by: NURSE PRACTITIONER

## 2025-05-14 PROCEDURE — 99214 OFFICE O/P EST MOD 30 MIN: CPT | Mod: S$PBB,,, | Performed by: NURSE PRACTITIONER

## 2025-05-14 PROCEDURE — 3044F HG A1C LEVEL LT 7.0%: CPT | Mod: CPTII,,, | Performed by: NURSE PRACTITIONER

## 2025-05-14 PROCEDURE — 1159F MED LIST DOCD IN RCRD: CPT | Mod: CPTII,,, | Performed by: NURSE PRACTITIONER

## 2025-05-14 PROCEDURE — 90471 IMMUNIZATION ADMIN: CPT | Mod: PBBFAC

## 2025-05-14 PROCEDURE — 3008F BODY MASS INDEX DOCD: CPT | Mod: CPTII,,, | Performed by: NURSE PRACTITIONER

## 2025-05-14 RX ORDER — VALACYCLOVIR HYDROCHLORIDE 1 G/1
1000 TABLET, FILM COATED ORAL DAILY
Qty: 20 TABLET | Refills: 1 | Status: SHIPPED | OUTPATIENT
Start: 2025-05-14

## 2025-05-14 RX ADMIN — HEPATITIS B VACCINE (RECOMBINANT) ADJUVANTED 0.5 ML: 20 INJECTION, SOLUTION INTRAMUSCULAR at 10:05

## 2025-05-14 NOTE — PROGRESS NOTES
Heplisav-B Vaccination given IM right deltoid using aseptic tech. Patient tolerated well. To contact clinic prn.

## 2025-05-14 NOTE — PROGRESS NOTES
Patient ID: Gayatri Márquez 39 y.o.     Chief Complaint:   Chief Complaint   Patient presents with    Followup HIV     Denies problems        HPI:    5/14/25  Gayatri is a 38 yo WF presenting today for HIV f/u visit. She is taking Dovato daily and tolerating it well. Labs 1/25 VL UD, CD4 745. She has retained immunity to Hep A & B, but Hep B s ab titer is waning and appreciates booster dose for same. Followed by Dr. Nagy for GYN services, last visit 10/24. She tells me that she does currently have HSV lesions. Has been using topical OTC treatments. States that they start to dry up, but then recur. Will treat with oral valtrex and she will notify me if continues to recur frequently afterwards. Did not yet schedule visit with [facility], needs to get her Medicaid # in order to schedule. Reminded to do so. No new sexual partners since last visit. STI screens were negative, denies need for repeat testing today. All questions answered & concerns addressed.    1/13/25  Gayatri is a 38 yo WF here today for HIV f/u visit. She has switched from Triumeq to Dovato as prescribed last visit. She is tolerating it well and has not noted any side effects. Will collect labs today for re-evaluation.  She has been intimate with a new partner, uses condoms for vaginal intercourse. Has engaged in oral sex as well. Will screen appropriately. Overdue for eye exam, will call [facility] to schedule a visit. All questions answered & concerns addressed.     11/12/24  Gayatri is a 38 yo WF presenting today for HIV f/u visit.  She has been taking Triumeq since time of diagnosis in 2019 and tolerates it well. Labs 7/24 VL UD, CD4 593, RPR NR. She is very adherent to treatment plan and does not miss doses.  Discussed with patient the possible risk for cardiac disease with abacavir containing regimens.  Shared decision making, agrees to switch to Dovato. Baseline VL <9000. Will notify me for any concerns. Will repeat VL today & in 6-8 weeks.  She did have cervical pap a couple of months ago, will request records from J[facility]EL Nagy's office. She is due for flu & pneumonia vaccinations, amenable to both today. She states that she has left her ex-boyfriend and is at peace. 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 All questions answered & concerns addressed.           Past Medical History:   Diagnosis Date    Abnormal Pap smear of cervix     H/O syphilis     Herpes simplex virus (HSV) infection     Valtrex PRN    Human immunodeficiency virus (HIV) disease     On triumeq, VL undetectable 8/2023    Insomnia     Vitamin D deficiency         Past Surgical History:   Procedure Laterality Date    COLD KNIFE CONIZATION OF CERVIX N/A 10/5/2023    Procedure: CONE BIOPSY, CERVIX, USING COLD KNIFE;  Surgeon: Shara Lyle MD;  Location: [facility];  Service: OB/GYN;  Laterality: N/A;    COLPOSCOPY, WITH BIOPSY OF CERVIX  07/28/2023    NEPHRECTOMY  05/19/2014    right    VASCULAR SURGERY          Social History     Socioeconomic History    Marital status: Significant Other    Number of children: 2   Tobacco Use    Smoking status: Every Day     Types: Vaping with nicotine    Smokeless tobacco: Current    Tobacco comments:     Stopped smoking cigarettes x 2 weeks. Vaping for now   Substance and Sexual Activity    Alcohol use: Not Currently     Comment: Denies    Drug use: Yes     Frequency: 7.0 times per week     Types: Marijuana     Comment: Socially    Sexual activity: Not Currently     Partners: Male     Birth control/protection: None, Condom     Comment: single     Social Drivers of Health     Financial Resource Strain: Low Risk  (11/30/2022)    Overall Financial Resource Strain (CARDIA)     Difficulty of Paying Living Expenses: Not hard at all   Food Insecurity: No Food Insecurity (11/30/2022)    Hunger Vital Sign     Worried About Running Out of Food in the Last Year: Never true     Ran Out of Food in the Last Year: Never true    Transportation Needs: No Transportation Needs (11/30/2022)    PRAPARE - Transportation     Lack of Transportation (Medical): No     Lack of Transportation (Non-Medical): No   Physical Activity: Inactive (11/30/2022)    Exercise Vital Sign     Days of Exercise per Week: 0 days     Minutes of Exercise per Session: 0 min   Stress: Stress Concern Present (11/30/2022)    [employer] - Occupational Stress Questionnaire     Feeling of Stress : To some extent   Housing Stability: Low Risk  (11/30/2022)    Housing Stability Vital Sign     Unable to Pay for Housing in the Last Year: No     Number of Places Lived in the Last Year: 1     Unstable Housing in the Last Year: No        Family History   Problem Relation Name Age of Onset    Arthritis Mother      Depression Mother      Hyperlipidemia Mother      Migraines Mother      Heart disease Father      Depression Maternal Grandmother      Diabetes Maternal Grandmother      Hypertension Maternal Grandmother      Coronary artery disease Paternal Grandmother      Diabetes Paternal Grandmother      Cardiomyopathy Paternal Grandfather      COPD Paternal Grandfather      Kidney disease Paternal Grandfather          Review of patient's allergies indicates:   Allergen Reactions    Sulfamethoxazole-trimethoprim Hives        Immunization History   Administered Date(s) Administered    HPV 9-Valent 08/12/2019, 10/09/2019, 10/14/2020    Hepatitis B 03/23/1998, 08/05/1998    Hepatitis B (recombinant) Adjuvanted, 2 dose 05/14/2025    Hepatitis B, Adolescent/High Risk Infant 10/06/1998    Hepatitis B, Pediatric/Adolescent 10/06/1998    Influenza - Quadrivalent 10/14/2020, 10/11/2021    Influenza - Quadrivalent - PF *Preferred* (6 months and older) 10/09/2019, 10/14/2020, 10/11/2021, 11/30/2022, 11/06/2023    Influenza - Trivalent - Fluarix, Flulaval, Fluzone, Afluria - PF 11/12/2024    Meningococcal Conjugate (MCV4P) 12/09/2019, 01/22/2021    OPV 03/23/1998     "PPD Test 06/14/2001    Pneumococcal Conjugate - 13 Valent 08/12/2019    Pneumococcal Conjugate - 20 Valent 11/12/2024    Pneumococcal Polysaccharide - 23 Valent 12/09/2019    Td (ADULT) 03/23/1998, 08/05/1998    Tdap 03/24/2009, 11/05/2020        Review of Systems   Constitutional: Negative.    HENT: Negative.     Eyes: Negative.    Respiratory: Negative.     Cardiovascular: Negative.    Gastrointestinal: Negative.    Genitourinary: Negative.    Musculoskeletal: Negative.    Skin: Negative.    Neurological: Negative.    Endo/Heme/Allergies: Negative.    Psychiatric/Behavioral: Negative.     All other systems reviewed and are negative.         Objective:      /68 (BP Location: Right arm, Patient Position: Sitting)   Pulse 71   Temp 97.7 °F (36.5 °C) (Oral)   Resp 10   Ht 5' 2" (1.575 m)   Wt 49.3 kg (108 lb 11 oz)   BMI 19.88 kg/m²      Physical Exam  Vitals reviewed.   Constitutional:       General: She is not in acute distress.     Appearance: Normal appearance. She is not toxic-appearing.   HENT:      Mouth/Throat:      Mouth: Mucous membranes are moist.      Pharynx: Oropharynx is clear.   Eyes:      Conjunctiva/sclera: Conjunctivae normal.   Cardiovascular:      Rate and Rhythm: Normal rate and regular rhythm.   Pulmonary:      Effort: Pulmonary effort is normal. No respiratory distress.      Breath sounds: Normal breath sounds.   Abdominal:      General: Abdomen is flat. Bowel sounds are normal.      Palpations: Abdomen is soft.   Musculoskeletal:         General: Normal range of motion.      Cervical back: Normal range of motion.   Lymphadenopathy:      Cervical: No cervical adenopathy.   Skin:     General: Skin is warm and dry.   Neurological:      General: No focal deficit present.      Mental Status: She is alert and oriented to person, place, and time. Mental status is at baseline.   Psychiatric:         Mood and Affect: Mood normal.         Behavior: Behavior normal.          Labs:   Lab " Results   Component Value Date    WBC 7.73 01/13/2025    HGB 13.9 01/13/2025    HCT 41.8 01/13/2025    .5 (H) 01/13/2025     01/13/2025       CMP  Sodium   Date Value Ref Range Status   01/13/2025 138 136 - 145 mmol/L Final     Potassium   Date Value Ref Range Status   01/13/2025 4.0 3.5 - 5.1 mmol/L Final     Chloride   Date Value Ref Range Status   01/13/2025 106 98 - 107 mmol/L Final     CO2   Date Value Ref Range Status   01/13/2025 25 22 - 29 mmol/L Final     Glucose   Date Value Ref Range Status   01/13/2025 77 74 - 100 mg/dL Final     Blood Urea Nitrogen   Date Value Ref Range Status   01/13/2025 9.7 7.0 - 18.7 mg/dL Final     Creatinine   Date Value Ref Range Status   01/13/2025 1.12 (H) 0.55 - 1.02 mg/dL Final     Calcium   Date Value Ref Range Status   01/13/2025 9.2 8.4 - 10.2 mg/dL Final     Protein Total   Date Value Ref Range Status   01/13/2025 7.4 6.4 - 8.3 gm/dL Final     Albumin   Date Value Ref Range Status   01/13/2025 4.1 3.5 - 5.0 g/dL Final     Bilirubin Total   Date Value Ref Range Status   01/13/2025 0.4 <=1.5 mg/dL Final     ALP   Date Value Ref Range Status   01/13/2025 59 40 - 150 unit/L Final     AST   Date Value Ref Range Status   01/13/2025 18 5 - 34 unit/L Final     ALT   Date Value Ref Range Status   01/13/2025 18 0 - 55 unit/L Final     eGFR   Date Value Ref Range Status   01/13/2025 >60 mL/min/1.73/m2 Final     Lab Results   Component Value Date    TSH 0.756 01/13/2025     Hep C Ab Interp   Date Value Ref Range Status   01/13/2025 Nonreactive Nonreactive Final     Syphilis Antibody   Date Value Ref Range Status   01/13/2025 Reactive (A) Nonreactive, Equivocal Final     RPR   Date Value Ref Range Status   01/13/2025 Non-Reactive Non-Reactive Final     Cholesterol Total   Date Value Ref Range Status   01/13/2025 185 <=200 mg/dL Final     HDL Cholesterol   Date Value Ref Range Status   01/13/2025 66 (H) 35 - 60 mg/dL Final     Triglyceride   Date Value Ref Range Status    01/13/2025 80 37 - 140 mg/dL Final     Cholesterol/HDL Ratio   Date Value Ref Range Status   01/13/2025 3 0 - 5 Final     Very Low Density Lipoprotein   Date Value Ref Range Status   01/13/2025 16  Final     LDL Cholesterol   Date Value Ref Range Status   01/13/2025 103.00 50.00 - 140.00 mg/dL Final     Vitamin D   Date Value Ref Range Status   01/13/2025 35 30 - 80 ng/mL Final     Results for orders placed or performed in visit on 01/13/25   CD4 Lymphocytes   Result Value Ref Range    Patient Age 39     WBC Absolute 7,730 4,500 - 11,500 /mm3    Lymph Percent 22 (L) 28 - 48 %    Lymph Absolute 1,700.6 1,260 - 5,520 x10(3)/mcL    CD4 % 43.8 %    CD4 Absolute 745 589 - 1,505 unit/L    T Cell Interp       Normal absolute lymphocyte count with normal absolute CD4+ lymphocyte count.    Soren Galicia M.D.     Narrative    This test was developed and its performance characteristics determined by [facility]. It has not been cleared or approved by the US Food and Drug Administration. The FDA does not require this test to go through premarket FDA review. This test is used for clinical purposes. It should not be regarded as investigational or for research. This laboratory is certified under the Clinical Laboratory Improvement Amendments (CLIA) as qualified to perform high complexity clinical laboratory testing.     Results for orders placed or performed in visit on 01/13/25   HIV-1 RNA, Quantitative, PCR with Reflex to Genotype   Result Value Ref Range    HIV-1 RNA Detect/Quant, P Undetected Undetected copies/mL     Results for orders placed or performed in visit on 01/13/25   Quantiferon Gold TB   Result Value Ref Range    QuantiFERON-Tb Gold Plus Result Negative Negative    TB1 Ag minus Nil Result 0.00 IU/mL    TB2 Ag minus Nil Result -0.01 IU/mL    Mitogen minus Nil Result 9.96 IU/mL    Nil Result 0.04 IU/mL     Results for orders placed or performed in visit on 01/13/25    C.trach/N.gonor AMP RNA    Specimen: Throat   Result Value Ref Range    Source THROAT     Chlamydia trachomatis amplified RNA Negative Negative    Neisseria gonorrhoeae amplified RNA Negative Negative    SOURCE: THROAT      Results for orders placed or performed in visit on 07/10/24   Urinalysis   Result Value Ref Range    Color, UA Light-Yellow Yellow, Light-Yellow, Dark Yellow, Dianne, Straw    Appearance, UA Clear Clear    Specific Gravity, UA 1.021 1.005 - 1.030    pH, UA 6.5 5.0 - 8.5    Protein, UA Negative Negative    Glucose, UA Normal Negative, Normal    Ketones, UA Negative Negative    Blood, UA Negative Negative    Bilirubin, UA Negative Negative    Urobilinogen, UA Normal 0.2, 1.0, Normal    Nitrites, UA Negative Negative    Leukocyte Esterase, UA Negative Negative    RBC, UA 0-5 None Seen, 0-2, 3-5, 0-5 /HPF    WBC, UA 0-5 None Seen, 0-2, 3-5, 0-5 /HPF    Bacteria, UA None Seen None Seen /HPF    Squamous Epithelial Cells, UA Moderate (A) None Seen /HPF    Mucous, UA Trace (A) None Seen /LPF    Hyaline Casts, UA None Seen None Seen /lpf       Imaging: Reviewed most recent relevant imaging studies available, notable results highlighted in this note    Medications:     Current Outpatient Medications   Medication Instructions    ascorbic acid (vitamin C) (VITAMIN C) 500 mg, Daily    dolutegravir-lamivudine  mg Tab 1 tablet, Oral, Daily    ergocalciferol (VITAMIN D2) 50,000 Units, Oral, Every 7 days    ferrous sulfate (FEROSUL) 325 mg, Oral, Daily    mupirocin (BACTROBAN) 2 % ointment Topical (Top), 3 times daily    valACYclovir (VALTREX) 1,000 mg, Oral, Daily       Assessment:       Problem List Items Addressed This Visit       HIV infection - Primary    Relevant Medications    dolutegravir-lamivudine  mg Tab    Other Relevant Orders    Comprehensive Metabolic Panel    HIV-1 RNA, Quantitative, PCR with Reflex to Genotype     Other Visit Diagnoses         Herpes labialis        Relevant  Medications    valACYclovir (VALTREX) 1000 MG tablet      Need for vaccination        Relevant Medications    hepatitis B (HEPLISAV-B) 20 mcg/0.5 mL vaccine 0.5 mL (Completed) (Start on 5/14/2025 10:51 AM)               Plan:      Asymptomatic HIV infection, with no history of HIV-related illness  -     dolutegravir-lamivudine  mg Tab; Take 1 tablet by mouth once daily.  Dispense: 30 tablet; Refill: 3  -     Cancel: HIV-1 RNA, Quantitative, PCR with Reflex to Genotype; Future; Expected date: 05/14/2025  -     Cancel: Comprehensive Metabolic Panel  -     Comprehensive Metabolic Panel; Future; Expected date: 05/14/2025  -     HIV-1 RNA, Quantitative, PCR with Reflex to Genotype; Future; Expected date: 05/14/2025  Diagnosed 6/2019.  VL [facility] 8260, CD4 317.  PAULETTE history: none.     Adherence and sexual health counseling done.  Use condoms for all sexual encounters.  Blood precautions.  Continue Dovato 1 po daily as prescribed.  Labs today as ordered.  RTC 4 months with Ashley.      Wellness:  Cervical pap: colpo 2/21, 7/23 [facility], [facility] 10/23 [facility], 10/8/24 Dr. JOSÉ MANUEL Nagy.  Anal pap: 10/20 Neg  Cervical CT/GC: 4/23 Neg, 11/23 Neg, 1/25  Anal CT/GC: 10/20 Neg  Oral CT/GC: 10/20 Neg, 11/23 Neg, 1/25  Eye exam: 3/21 (will go to [facility], 3/24 & 1/25)    Herpes labialis  -     valACYclovir (VALTREX) 1000 MG tablet; Take 1 tablet (1,000 mg total) by mouth once daily.  Dispense: 20 tablet; Refill: 1  Valtrex 2 gm today, then 1 gm daily until cleared.   Notify me if recurs frequently for daily suppressive therapy.     Need for vaccination  -     hepatitis B (HEPLISAV-B) 20 mcg/0.5 mL vaccine 0.5 mL  Heplisav-B booster dose today.

## 2025-05-14 NOTE — LETTER
May 14, 2025      [facility]  [address] [address]  Phone: [phone]       Patient: Gayatri Márquez   YOB: 1985  Date of Visit: 05/14/2025    To Whom It May Concern:    Anita Márquez  was at [facility] on 05/14/2025. The patient may return to work on 05/15/2025 with no restrictions. If you have any questions or concerns, or if I can be of further assistance, please do not hesitate to contact me.    Sincerely,    Tracey Rg LPN

## 2025-05-16 LAB — HIV1 RNA # PLAS NAA DL=20: NORMAL COPIES/ML

## (undated) DEVICE — GLOVE SIGNATURE MICRO LTX 7

## (undated) DEVICE — BLADE TONGUE DEPRESSOR STRL

## (undated) DEVICE — KIT SURGICAL TURNOVER

## (undated) DEVICE — HEMOSTAT SURGICEL 2X3IN

## (undated) DEVICE — PAD PREP CUFFED NS 24X48IN

## (undated) DEVICE — DRAPE UNDERBUTTOCKS PCH STRL

## (undated) DEVICE — ELECTRODE BALL RED 5MM

## (undated) DEVICE — BLADE SURG STAINLESS STEEL #11

## (undated) DEVICE — Device

## (undated) DEVICE — CATH RED RUBBER LATEX 14F 16IN

## (undated) DEVICE — TIP SUCTION YANKAUER

## (undated) DEVICE — CONTAINER SPECIMEN OR STER 4OZ

## (undated) DEVICE — LEGGING SURG CONV 43X28IN

## (undated) DEVICE — MANIFOLD 4 PORT

## (undated) DEVICE — SOLIDIFIER BOTTLE 1500CC

## (undated) DEVICE — TRAY SKIN SCRUB WET PREMIUM

## (undated) DEVICE — SYR 10CC LUER LOCK

## (undated) DEVICE — DRESSING TELFA STRL 4X3 LF

## (undated) DEVICE — GLOVE SIGNATURE MICRO LTX 6.5

## (undated) DEVICE — SUT 2/0 30IN SILK BLK BRAI

## (undated) DEVICE — GLOVE SENSICARE PI GRN 6.5

## (undated) DEVICE — NDL SPINAL 22GA 3.5 IN QUINCKE

## (undated) DEVICE — GLOVE SENSICARE PI GRN 6

## (undated) DEVICE — GOWN POLY REINF BRTH SLV XL

## (undated) DEVICE — SOL 9P NACL IRR PIC IL

## (undated) DEVICE — SUT 2/0 36IN COATED VICRYL

## (undated) DEVICE — PAD CURAD NONADH 3X4IN

## (undated) DEVICE — GLOVE PROTEXIS LIGHT BROWN 5.5